# Patient Record
Sex: MALE | Race: BLACK OR AFRICAN AMERICAN | NOT HISPANIC OR LATINO | Employment: FULL TIME | URBAN - METROPOLITAN AREA
[De-identification: names, ages, dates, MRNs, and addresses within clinical notes are randomized per-mention and may not be internally consistent; named-entity substitution may affect disease eponyms.]

---

## 2020-09-29 ENCOUNTER — CLINICAL SUPPORT (OUTPATIENT)
Dept: INTERNAL MEDICINE | Facility: CLINIC | Age: 50
End: 2020-09-29
Payer: COMMERCIAL

## 2020-09-29 ENCOUNTER — OFFICE VISIT (OUTPATIENT)
Dept: INTERNAL MEDICINE | Facility: CLINIC | Age: 50
End: 2020-09-29
Payer: COMMERCIAL

## 2020-09-29 ENCOUNTER — IMMUNIZATION (OUTPATIENT)
Dept: INTERNAL MEDICINE | Facility: CLINIC | Age: 50
End: 2020-09-29
Payer: COMMERCIAL

## 2020-09-29 VITALS
OXYGEN SATURATION: 97 % | HEIGHT: 66 IN | SYSTOLIC BLOOD PRESSURE: 130 MMHG | BODY MASS INDEX: 40.98 KG/M2 | DIASTOLIC BLOOD PRESSURE: 88 MMHG | WEIGHT: 255 LBS

## 2020-09-29 DIAGNOSIS — G89.29 CHRONIC LOW BACK PAIN WITH SCIATICA, SCIATICA LATERALITY UNSPECIFIED, UNSPECIFIED BACK PAIN LATERALITY: ICD-10-CM

## 2020-09-29 DIAGNOSIS — M54.40 CHRONIC LOW BACK PAIN WITH SCIATICA, SCIATICA LATERALITY UNSPECIFIED, UNSPECIFIED BACK PAIN LATERALITY: ICD-10-CM

## 2020-09-29 DIAGNOSIS — I10 HYPERTENSION, UNSPECIFIED TYPE: Primary | ICD-10-CM

## 2020-09-29 DIAGNOSIS — Z00.00 LABORATORY EXAMINATION ORDERED AS PART OF A ROUTINE GENERAL MEDICAL EXAMINATION: ICD-10-CM

## 2020-09-29 DIAGNOSIS — Z11.59 ENCOUNTER FOR HEPATITIS C SCREENING TEST FOR LOW RISK PATIENT: ICD-10-CM

## 2020-09-29 DIAGNOSIS — G47.33 OSA (OBSTRUCTIVE SLEEP APNEA): ICD-10-CM

## 2020-09-29 PROCEDURE — 99999 PR PBB SHADOW E&M-EST. PATIENT-LVL I: CPT | Mod: PBBFAC,,,

## 2020-09-29 PROCEDURE — 90715 TDAP VACCINE GREATER THAN OR EQUAL TO 7YO IM: ICD-10-PCS | Mod: S$GLB,,, | Performed by: INTERNAL MEDICINE

## 2020-09-29 PROCEDURE — 90472 TDAP VACCINE GREATER THAN OR EQUAL TO 7YO IM: ICD-10-PCS | Mod: S$GLB,,, | Performed by: INTERNAL MEDICINE

## 2020-09-29 PROCEDURE — 99204 OFFICE O/P NEW MOD 45 MIN: CPT | Mod: 25,S$GLB,, | Performed by: INTERNAL MEDICINE

## 2020-09-29 PROCEDURE — 99204 PR OFFICE/OUTPT VISIT, NEW, LEVL IV, 45-59 MIN: ICD-10-PCS | Mod: 25,S$GLB,, | Performed by: INTERNAL MEDICINE

## 2020-09-29 PROCEDURE — 90471 FLU VACCINE (QUAD) GREATER THAN OR EQUAL TO 3YO PRESERVATIVE FREE IM: ICD-10-PCS | Mod: S$GLB,,, | Performed by: INTERNAL MEDICINE

## 2020-09-29 PROCEDURE — 99999 PR PBB SHADOW E&M-EST. PATIENT-LVL IV: ICD-10-PCS | Mod: PBBFAC,,, | Performed by: INTERNAL MEDICINE

## 2020-09-29 PROCEDURE — 99999 PR PBB SHADOW E&M-EST. PATIENT-LVL I: ICD-10-PCS | Mod: PBBFAC,,,

## 2020-09-29 PROCEDURE — 90686 IIV4 VACC NO PRSV 0.5 ML IM: CPT | Mod: S$GLB,,, | Performed by: INTERNAL MEDICINE

## 2020-09-29 PROCEDURE — 90471 IMMUNIZATION ADMIN: CPT | Mod: S$GLB,,, | Performed by: INTERNAL MEDICINE

## 2020-09-29 PROCEDURE — 90715 TDAP VACCINE 7 YRS/> IM: CPT | Mod: S$GLB,,, | Performed by: INTERNAL MEDICINE

## 2020-09-29 PROCEDURE — 90686 FLU VACCINE (QUAD) GREATER THAN OR EQUAL TO 3YO PRESERVATIVE FREE IM: ICD-10-PCS | Mod: S$GLB,,, | Performed by: INTERNAL MEDICINE

## 2020-09-29 PROCEDURE — 99999 PR PBB SHADOW E&M-EST. PATIENT-LVL IV: CPT | Mod: PBBFAC,,, | Performed by: INTERNAL MEDICINE

## 2020-09-29 PROCEDURE — 90472 IMMUNIZATION ADMIN EACH ADD: CPT | Mod: S$GLB,,, | Performed by: INTERNAL MEDICINE

## 2020-09-29 RX ORDER — CELECOXIB 100 MG/1
100 CAPSULE ORAL 2 TIMES DAILY PRN
Qty: 60 CAPSULE | Refills: 1 | Status: SHIPPED | OUTPATIENT
Start: 2020-09-29 | End: 2021-05-27

## 2020-09-29 RX ORDER — LOSARTAN POTASSIUM 50 MG/1
TABLET ORAL
COMMUNITY
Start: 2020-08-22 | End: 2021-03-01 | Stop reason: SDUPTHER

## 2020-09-29 RX ORDER — HYDROCHLOROTHIAZIDE 12.5 MG/1
12.5 CAPSULE ORAL DAILY
COMMUNITY
End: 2021-03-01

## 2020-09-29 NOTE — PROGRESS NOTES
"    CHIEF COMPLAINT     Chief Complaint   Patient presents with    [facility]     initial visit with Dr Weiss. previous provider was outside of [facility].     Hypertension     is currently taking Losartan and HCTZ (has been off for about 4-4 1/2 weeks) to help lower BP. patient is requesting Rx refills on HCTZ.    Sciatica     reports of R sided sciatica (R upper thigh radiates down R leg). anti inflammatories are realy helpful, has not tried nerve medication such as Gabapentin.       HPI     Coy Rosado Jr. is a 49 y.o. male here today for     HTN  Prescribed losartan 50mg and hctzd 12.5mg daily.  He has been out of the hctzd 4.5 weeks. Doesn't check BP at home. No symptoms of HTN.  RF: BMI, CHERRY    Lower Back pain  Reports remote injury feel stripping and waxing floors. Reports symptoms are off and on. Reports improve with stretching. Takes PRN celebrex. Reports requiring medications approximately 1-2x month.  Patient also reports sometimes get a little bit radiculopathy with symptoms.  Patient had imaging at 1 point.  Never had injections never been in physical therapy never had surgery.    Sleep apnea  Patient was diagnosed with CHERRY in 2017.  Reports he uses machine nightly.        Personally Reviewed Patient's Medical, surgical, family and social hx. Changes updated in [facility].  Care Team updated in Epic    Review of Systems:  Review of Systems   Constitutional: Negative for fever and unexpected weight change.   Respiratory: Negative for cough and choking.    Gastrointestinal: Negative for blood in stool and constipation.   Musculoskeletal: Positive for back pain.       Health Maintenance:   Reviewed with patient  Due for the following:      PHYSICAL EXAM     /88 (BP Location: Left arm, Patient Position: Sitting, BP Method: Large (Manual))   Ht 5' 6" (1.676 m)   Wt 115.7 kg (255 lb)   SpO2 97%   BMI 41.16 kg/m²     Gen: Well Appearing, NAD  HEENT: PERR, EOMI  Neck: FROM, no thyromegaly, no cervical " adenopathy  CVD: RRR, no M/R/G  Pulm: Normal work of breathing, CTAB, no wheezing  Abd:  Soft, NT, ND non TTP, no mass  MSK: no LE edema  Neuro: A&Ox3, gait normal, speech normal  Mood; Mood normal, behavior normal, thought process linear       LABS     Labs reviewed; Notable for    ASSESSMENT     1. Hypertension, unspecified type     2. Chronic low back pain with sciatica, sciatica laterality unspecified, unspecified back pain laterality  celecoxib (CELEBREX) 100 MG capsule   3. CHERRY (obstructive sleep apnea)  Ambulatory referral/consult to Sleep Disorders   4. Encounter for hepatitis C screening test for low risk patient  Hepatitis C Antibody   5. BMI 40.0-44.9, adult     6. Laboratory examination ordered as part of a routine general medical examination  Lipid Panel    Hemoglobin A1C    Comprehensive metabolic panel    CBC auto differential           Plan     Coy Rosado Jr. is a 49 y.o. male with hypertension, CHERRY chronic low back pain and BMI 41 here today for below issues    1. Hypertension, unspecified type  Blood pressure appears to be fairly well controlled on losartan 50 monotherapy.  Discussed increasing to 100 mg verses maintaining 50 mg dose and making lifestyle optimization.  Shared decision was made to try lifestyle optimization.  Patient will check blood pressure twice some week and then will follow-up 1 month with me.    2. Chronic low back pain with sciatica, sciatica laterality unspecified,   unspecified back pain laterality  Will refill Celebrex for patient to take 1 to 2 times a month.  Will have him reach out to me if he needs more frequent that.  Would be a good candidate for PT.  Think core strengthening weight loss will improve some of his symptoms well.  - celecoxib (CELEBREX) 100 MG capsule; Take 1 capsule (100 mg total) by mouth 2 (two) times daily as needed for Pain.  Dispense: 60 capsule; Refill: 1    3. CHERRY (obstructive sleep apnea)  Patient has not had titration study in over 3 years  with some weight change.  Think he would benefit from reestablishing with sleep.  - Ambulatory referral/consult to Sleep Disorders; Future    4. Encounter for hepatitis C screening test for low risk patient  - Hepatitis C Antibody; Future    5. BMI 40.0-44.9, adult  Discussed lifestyle interventions for weight loss  Diet: harm reduction strategy: avoid liquid calories, add fruits and veggies to crowd out refined carbs, watch portions, eat at home more often  Activity: activity levelgoal of 150min/week  Sleep: optimize for 8 hours of sleep nightly  Hedonistic eating: avoid eating out of boredom, stress or in front of TV      6. Laboratory examination ordered as part of a routine general medical   examination  Labs for next time  - Lipid Panel; Future  - Hemoglobin A1C; Future  - Comprehensive metabolic panel; Future  - CBC auto differential; Future      Shorty Weiss MD

## 2020-09-29 NOTE — PATIENT INSTRUCTIONS
Eating Heart-Healthy Food: Using the DASH Plan    Eating for your heart doesnt have to be hard or boring. You just need to know how to make healthier choices. The DASH eating plan has been developed to help you do just that. DASH stands for Dietary Approaches to Stop Hypertension. It is a plan that has been proven to be healthier for your heart and to lower your risk for high blood pressure. It can also help lower your risk for cancer, heart disease, osteoporosis, and diabetes.  Choosing from each food group  Choose foods from each of the food groups below each day. Try to get the recommended number of servings for each food group. The serving numbers are based on a diet of 2,000 calories a day. Talk to your doctor if youre unsure about your calorie needs. Along with getting the correct servings, the DASH plan also recommends a sodium intake less than 2,300 mg per day.        Grains  Servings: 6 to 8 a day  A serving is:  · 1 slice bread  · 1 ounce dry cereal  · Half a cup cooked rice, pasta or cereal  Best choices: Whole grains and any grains high in fiber. Vegetables  Servings: 4 to 5 a day  A serving is:  · 1 cup raw leafy vegetable  · Half a cup cut-up raw or cooked vegetable  · Half a cup vegetable juice  Best choices: Fresh or frozen vegetables prepared without added salt or fat.   Fruits  Servings: 4 to 5 a day  A serving is:  · 1 medium fruit  · One-quarter cup dried fruit  · Half a cup fresh, frozen, or canned fruit  · Half a cup of 100% fruit juices  Best choices: A variety of fresh fruits of different colors. Whole fruits are a better choice than fruit juices. Low-fat or fat-free dairy  Servings: 2 to 3 a day  A serving is:  · 1 cup milk  · 1 cup yogurt  · One and a half ounces cheese  Best choices: Skim or 1% milk, low-fat or fat-free yogurt or buttermilk, and low-fat cheeses.         Lean meats, poultry, fish  Servings: 6 or fewer a day  A serving is:  · 1 ounce cooked meats, poultry, or fish  · 1  egg  Best choices: Lean poultry and fish. Trim away visible fat. Broil, grill, roast, or boil instead of frying. Remove skin from poultry before eating. Limit how much red meat you eat.  Nuts, seeds, beans  Servings: 4 to 5 a week  A serving is:  · One-third cup nuts (one and a half ounces)  · 2 tablespoons nut butter or seeds  · Half a cup cooked dry beans or legumes  Best choices: Dry roasted nuts with no salt added, lentils, kidney beans, garbanzo beans, and whole davila beans.   Fats and oils  Servings: 2 to 3 a day  A serving is:  · 1 teaspoon vegetable oil  · 1 teaspoon soft margarine  · 1 tablespoon mayonnaise  · 2 tablespoons salad dressing  Best choices: Nut and vegetable oils (nontropical vegetable oils), such as olive and canola oil. Sweets  Servings: 5 a week or fewer  A serving is:  · 1 tablespoon sugar, maple syrup, or honey  · 1 tablespoon jam or jelly  · 1 half-ounce jelly beans (about 15)  · 1 cup lemonade  Best choices: Dried fruit can be a satisfying sweet. Choose low-fat sweets. And watch your serving sizes!      For more on the DASH eating plan, visit:  www.nhlbi.nih.gov/health/health-topics/topics/dash   Date Last Reviewed: 6/1/2016  © 3003-2416 [employer]. [address] [place] [address], [place] [address]. All rights reserved. This information is not intended as a substitute for professional medical care. Always follow your healthcare professional's instructions.

## 2020-09-30 ENCOUNTER — OFFICE VISIT (OUTPATIENT)
Dept: SLEEP MEDICINE | Facility: CLINIC | Age: 50
End: 2020-09-30
Payer: COMMERCIAL

## 2020-09-30 VITALS
WEIGHT: 248.56 LBS | SYSTOLIC BLOOD PRESSURE: 125 MMHG | DIASTOLIC BLOOD PRESSURE: 84 MMHG | HEIGHT: 66 IN | HEART RATE: 78 BPM | BODY MASS INDEX: 39.94 KG/M2

## 2020-09-30 DIAGNOSIS — G47.33 OSA (OBSTRUCTIVE SLEEP APNEA): ICD-10-CM

## 2020-09-30 PROCEDURE — 99999 PR PBB SHADOW E&M-EST. PATIENT-LVL III: ICD-10-PCS | Mod: PBBFAC,,, | Performed by: INTERNAL MEDICINE

## 2020-09-30 PROCEDURE — 99999 PR PBB SHADOW E&M-EST. PATIENT-LVL III: CPT | Mod: PBBFAC,,, | Performed by: INTERNAL MEDICINE

## 2020-09-30 PROCEDURE — 99204 OFFICE O/P NEW MOD 45 MIN: CPT | Mod: S$GLB,,, | Performed by: INTERNAL MEDICINE

## 2020-09-30 PROCEDURE — 99204 PR OFFICE/OUTPT VISIT, NEW, LEVL IV, 45-59 MIN: ICD-10-PCS | Mod: S$GLB,,, | Performed by: INTERNAL MEDICINE

## 2020-09-30 NOTE — LETTER
September 30, 2020      Shorty Weiss MD  [address]  [facility] [address]           [id number]  [address]  [facility] [address]  Phone: [phone]          Patient: Coy Rosado Jr.   MR Number: [id number]   YOB: 1970   Date of Visit: 9/30/2020       Dear Dr. Shorty Weiss:    Thank you for referring Coy Rosado to me for evaluation. Attached you will find relevant portions of my assessment and plan of care.    If you have questions, please do not hesitate to call me. I look forward to following Coy Rosado along with you.    Sincerely,    Morteza Gabriel MD    Enclosure  CC:  No Recipients    If you would like to receive this communication electronically, please contact [email or web address] or [phone] to request more information on [employer] [facility] [facility] access.    For providers and/or their staff who would like to refer a patient to [facility], please contact us through our one-stop-shop provider referral line, [facility], at [phone].    If you feel you have received this communication in error or would no longer like to receive these types of communications, please e-mail [email or web address]

## 2020-10-01 ENCOUNTER — TELEPHONE (OUTPATIENT)
Dept: SLEEP MEDICINE | Facility: CLINIC | Age: 50
End: 2020-10-01

## 2020-10-01 DIAGNOSIS — G47.33 OSA (OBSTRUCTIVE SLEEP APNEA): Primary | ICD-10-CM

## 2020-10-01 NOTE — TELEPHONE ENCOUNTER
----- Message from Morteza Gabriel MD sent at 9/30/2020  4:35 PM CDT -----  Make sure to order sleep study , machine and supplies (nasal)

## 2020-10-07 ENCOUNTER — TELEPHONE (OUTPATIENT)
Dept: SLEEP MEDICINE | Facility: OTHER | Age: 50
End: 2020-10-07

## 2020-10-14 ENCOUNTER — TELEPHONE (OUTPATIENT)
Dept: SLEEP MEDICINE | Facility: OTHER | Age: 50
End: 2020-10-14

## 2020-11-10 ENCOUNTER — TELEPHONE (OUTPATIENT)
Dept: SLEEP MEDICINE | Facility: CLINIC | Age: 50
End: 2020-11-10

## 2020-11-10 ENCOUNTER — TELEPHONE (OUTPATIENT)
Dept: SLEEP MEDICINE | Facility: OTHER | Age: 50
End: 2020-11-10

## 2020-11-10 NOTE — TELEPHONE ENCOUNTER
Left patient a message letting him know the time his home sleep study appointment is for tomorrow.  I called him back a few times left messages.

## 2020-11-10 NOTE — TELEPHONE ENCOUNTER
----- Message from Mayuri Paredes sent at 11/10/2020  3:03 PM CST -----  Who Called: ROBERT BHAKTA is the reqeust in detail:Patient is calling in reference to verifying info on sleep appt tomorrow. He stated he is not sure on what time to  equipment. Please adviseCan the clinic reply by [facility]?:[facility] Call Back Number: [phone]

## 2020-11-10 NOTE — TELEPHONE ENCOUNTER
----- Message from Sukumar Butler sent at 11/10/2020  2:34 PM CST -----      Name of Who is Calling: ROBERT BHAKTA JR. [[id number]]      What is the request in detail: Pt returned call to the office.Please contact to further discuss and advise.          Can the clinic reply by [facility]: N      What [facility] to [facility]: [phone]

## 2020-11-11 ENCOUNTER — HOSPITAL ENCOUNTER (OUTPATIENT)
Dept: SLEEP MEDICINE | Facility: OTHER | Age: 50
Discharge: HOME | End: 2020-11-11
Attending: INTERNAL MEDICINE
Payer: COMMERCIAL

## 2020-11-11 DIAGNOSIS — G47.33 OSA (OBSTRUCTIVE SLEEP APNEA): ICD-10-CM

## 2020-11-11 PROCEDURE — 95800 SLP STDY UNATTENDED: CPT

## 2020-11-13 ENCOUNTER — TELEPHONE (OUTPATIENT)
Dept: SLEEP MEDICINE | Facility: CLINIC | Age: 50
End: 2020-11-13

## 2020-11-13 DIAGNOSIS — G47.33 OSA (OBSTRUCTIVE SLEEP APNEA): Primary | ICD-10-CM

## 2020-11-13 NOTE — TELEPHONE ENCOUNTER
Left message on the patient's voicemail that his Hst showed CHERRY and I will put an order in for a new machine.  Will also send a message on the patient portal.       Orders:  autoCPAP [id number]  Nasal interface

## 2021-02-01 ENCOUNTER — OCCUPATIONAL HEALTH (OUTPATIENT)
Dept: URGENT CARE | Facility: CLINIC | Age: 51
End: 2021-02-01

## 2021-02-01 DIAGNOSIS — Z11.59 SCREENING FOR VIRAL DISEASE: Primary | ICD-10-CM

## 2021-02-01 LAB
CTP QC/QA: YES
SARS-COV-2 RDRP RESP QL NAA+PROBE: NEGATIVE

## 2021-02-01 PROCEDURE — 99199 CV19 SPECIMEN HANDLING FEE / SWAB: ICD-10-PCS | Mod: S$GLB,,, | Performed by: FAMILY MEDICINE

## 2021-02-01 PROCEDURE — U0002 COVID-19 LAB TEST NON-CDC: HCPCS | Mod: QW,S$GLB,, | Performed by: FAMILY MEDICINE

## 2021-02-01 PROCEDURE — U0002: ICD-10-PCS | Mod: QW,S$GLB,, | Performed by: FAMILY MEDICINE

## 2021-02-01 PROCEDURE — 99199 UNLISTED SPECIAL SVC PX/RPRT: CPT | Mod: S$GLB,,, | Performed by: FAMILY MEDICINE

## 2021-03-01 ENCOUNTER — LAB VISIT (OUTPATIENT)
Dept: INTERNAL MEDICINE | Facility: CLINIC | Age: 51
End: 2021-03-01
Payer: COMMERCIAL

## 2021-03-01 ENCOUNTER — OFFICE VISIT (OUTPATIENT)
Dept: INTERNAL MEDICINE | Facility: CLINIC | Age: 51
End: 2021-03-01
Payer: COMMERCIAL

## 2021-03-01 ENCOUNTER — LAB VISIT (OUTPATIENT)
Dept: LAB | Facility: HOSPITAL | Age: 51
End: 2021-03-01
Payer: COMMERCIAL

## 2021-03-01 VITALS
SYSTOLIC BLOOD PRESSURE: 126 MMHG | DIASTOLIC BLOOD PRESSURE: 86 MMHG | BODY MASS INDEX: 41.27 KG/M2 | HEIGHT: 66 IN | OXYGEN SATURATION: 98 % | HEART RATE: 70 BPM | WEIGHT: 256.81 LBS | TEMPERATURE: 98 F

## 2021-03-01 DIAGNOSIS — M54.2 NECK AND SHOULDER PAIN: ICD-10-CM

## 2021-03-01 DIAGNOSIS — R53.83 FATIGUE, UNSPECIFIED TYPE: ICD-10-CM

## 2021-03-01 DIAGNOSIS — R09.81 SINUS CONGESTION: ICD-10-CM

## 2021-03-01 DIAGNOSIS — I10 ESSENTIAL HYPERTENSION: ICD-10-CM

## 2021-03-01 DIAGNOSIS — R51.9 NONINTRACTABLE EPISODIC HEADACHE, UNSPECIFIED HEADACHE TYPE: ICD-10-CM

## 2021-03-01 DIAGNOSIS — R51.9 NONINTRACTABLE EPISODIC HEADACHE, UNSPECIFIED HEADACHE TYPE: Primary | ICD-10-CM

## 2021-03-01 DIAGNOSIS — M25.519 NECK AND SHOULDER PAIN: ICD-10-CM

## 2021-03-01 LAB
ALBUMIN SERPL BCP-MCNC: 3.6 G/DL (ref 3.5–5.2)
ALP SERPL-CCNC: 98 U/L (ref 55–135)
ALT SERPL W/O P-5'-P-CCNC: 17 U/L (ref 10–44)
ANION GAP SERPL CALC-SCNC: 6 MMOL/L (ref 8–16)
AST SERPL-CCNC: 12 U/L (ref 10–40)
BASOPHILS # BLD AUTO: 0.04 K/UL (ref 0–0.2)
BASOPHILS NFR BLD: 0.7 % (ref 0–1.9)
BILIRUB SERPL-MCNC: 0.6 MG/DL (ref 0.1–1)
BUN SERPL-MCNC: 13 MG/DL (ref 6–20)
CALCIUM SERPL-MCNC: 9.5 MG/DL (ref 8.7–10.5)
CHLORIDE SERPL-SCNC: 105 MMOL/L (ref 95–110)
CO2 SERPL-SCNC: 29 MMOL/L (ref 23–29)
CREAT SERPL-MCNC: 0.9 MG/DL (ref 0.5–1.4)
DIFFERENTIAL METHOD: ABNORMAL
EOSINOPHIL # BLD AUTO: 0.1 K/UL (ref 0–0.5)
EOSINOPHIL NFR BLD: 1.8 % (ref 0–8)
ERYTHROCYTE [DISTWIDTH] IN BLOOD BY AUTOMATED COUNT: 12.5 % (ref 11.5–14.5)
EST. GFR  (AFRICAN AMERICAN): >60 ML/MIN/1.73 M^2
EST. GFR  (NON AFRICAN AMERICAN): >60 ML/MIN/1.73 M^2
GLUCOSE SERPL-MCNC: 91 MG/DL (ref 70–110)
HCT VFR BLD AUTO: 41.3 % (ref 40–54)
HGB BLD-MCNC: 13.6 G/DL (ref 14–18)
IMM GRANULOCYTES # BLD AUTO: 0.03 K/UL (ref 0–0.04)
IMM GRANULOCYTES NFR BLD AUTO: 0.5 % (ref 0–0.5)
LYMPHOCYTES # BLD AUTO: 2.9 K/UL (ref 1–4.8)
LYMPHOCYTES NFR BLD: 47.4 % (ref 18–48)
MCH RBC QN AUTO: 30.7 PG (ref 27–31)
MCHC RBC AUTO-ENTMCNC: 32.9 G/DL (ref 32–36)
MCV RBC AUTO: 93 FL (ref 82–98)
MONOCYTES # BLD AUTO: 0.8 K/UL (ref 0.3–1)
MONOCYTES NFR BLD: 12.9 % (ref 4–15)
NEUTROPHILS # BLD AUTO: 2.2 K/UL (ref 1.8–7.7)
NEUTROPHILS NFR BLD: 36.7 % (ref 38–73)
NRBC BLD-RTO: 0 /100 WBC
PLATELET # BLD AUTO: 271 K/UL (ref 150–350)
PMV BLD AUTO: 9.8 FL (ref 9.2–12.9)
POTASSIUM SERPL-SCNC: 4.1 MMOL/L (ref 3.5–5.1)
PROT SERPL-MCNC: 7.4 G/DL (ref 6–8.4)
RBC # BLD AUTO: 4.43 M/UL (ref 4.6–6.2)
SODIUM SERPL-SCNC: 140 MMOL/L (ref 136–145)
TSH SERPL DL<=0.005 MIU/L-ACNC: 1.02 UIU/ML (ref 0.4–4)
WBC # BLD AUTO: 6.05 K/UL (ref 3.9–12.7)

## 2021-03-01 PROCEDURE — 36415 COLL VENOUS BLD VENIPUNCTURE: CPT

## 2021-03-01 PROCEDURE — U0003 INFECTIOUS AGENT DETECTION BY NUCLEIC ACID (DNA OR RNA); SEVERE ACUTE RESPIRATORY SYNDROME CORONAVIRUS 2 (SARS-COV-2) (CORONAVIRUS DISEASE [COVID-19]), AMPLIFIED PROBE TECHNIQUE, MAKING USE OF HIGH THROUGHPUT TECHNOLOGIES AS DESCRIBED BY CMS-2020-01-R: HCPCS

## 2021-03-01 PROCEDURE — 84443 ASSAY THYROID STIM HORMONE: CPT

## 2021-03-01 PROCEDURE — 99214 OFFICE O/P EST MOD 30 MIN: CPT | Mod: S$GLB,,, | Performed by: PHYSICIAN ASSISTANT

## 2021-03-01 PROCEDURE — 99214 PR OFFICE/OUTPT VISIT, EST, LEVL IV, 30-39 MIN: ICD-10-PCS | Mod: S$GLB,,, | Performed by: PHYSICIAN ASSISTANT

## 2021-03-01 PROCEDURE — 99999 PR PBB SHADOW E&M-EST. PATIENT-LVL III: CPT | Mod: PBBFAC,,, | Performed by: PHYSICIAN ASSISTANT

## 2021-03-01 PROCEDURE — 85025 COMPLETE CBC W/AUTO DIFF WBC: CPT

## 2021-03-01 PROCEDURE — 80053 COMPREHEN METABOLIC PANEL: CPT

## 2021-03-01 PROCEDURE — U0005 INFEC AGEN DETEC AMPLI PROBE: HCPCS

## 2021-03-01 PROCEDURE — 99999 PR PBB SHADOW E&M-EST. PATIENT-LVL III: ICD-10-PCS | Mod: PBBFAC,,, | Performed by: PHYSICIAN ASSISTANT

## 2021-03-01 RX ORDER — LOSARTAN POTASSIUM 50 MG/1
50 TABLET ORAL DAILY
Qty: 90 TABLET | Refills: 2 | Status: SHIPPED | OUTPATIENT
Start: 2021-03-01 | End: 2022-03-22

## 2021-03-01 RX ORDER — TIZANIDINE 4 MG/1
4 TABLET ORAL NIGHTLY PRN
Qty: 10 TABLET | Refills: 0 | Status: SHIPPED | OUTPATIENT
Start: 2021-03-01 | End: 2021-03-11

## 2021-03-02 LAB — SARS-COV-2 RNA RESP QL NAA+PROBE: NOT DETECTED

## 2021-05-03 DIAGNOSIS — M25.569 KNEE PAIN, UNSPECIFIED CHRONICITY, UNSPECIFIED LATERALITY: Primary | ICD-10-CM

## 2021-05-04 ENCOUNTER — OFFICE VISIT (OUTPATIENT)
Dept: ORTHOPEDICS | Facility: CLINIC | Age: 51
End: 2021-05-04
Payer: COMMERCIAL

## 2021-05-04 ENCOUNTER — HOSPITAL ENCOUNTER (OUTPATIENT)
Dept: RADIOLOGY | Facility: HOSPITAL | Age: 51
Discharge: HOME | End: 2021-05-04
Attending: ORTHOPAEDIC SURGERY
Payer: COMMERCIAL

## 2021-05-04 ENCOUNTER — TELEPHONE (OUTPATIENT)
Dept: ORTHOPEDICS | Facility: CLINIC | Age: 51
End: 2021-05-04

## 2021-05-04 VITALS — BODY MASS INDEX: 40.76 KG/M2 | WEIGHT: 253.63 LBS | HEIGHT: 66 IN

## 2021-05-04 DIAGNOSIS — M25.569 KNEE PAIN, UNSPECIFIED CHRONICITY, UNSPECIFIED LATERALITY: ICD-10-CM

## 2021-05-04 DIAGNOSIS — M17.11 PRIMARY OSTEOARTHRITIS OF RIGHT KNEE: Primary | ICD-10-CM

## 2021-05-04 PROCEDURE — 99999 PR PBB SHADOW E&M-EST. PATIENT-LVL II: ICD-10-PCS | Mod: PBBFAC,,, | Performed by: ORTHOPAEDIC SURGERY

## 2021-05-04 PROCEDURE — 73564 XR KNEE ORTHO BILAT WITH FLEXION: ICD-10-PCS | Mod: 26,50,, | Performed by: RADIOLOGY

## 2021-05-04 PROCEDURE — 73564 X-RAY EXAM KNEE 4 OR MORE: CPT | Mod: TC,50

## 2021-05-04 PROCEDURE — 99999 PR PBB SHADOW E&M-EST. PATIENT-LVL II: CPT | Mod: PBBFAC,,, | Performed by: ORTHOPAEDIC SURGERY

## 2021-05-04 PROCEDURE — 73564 X-RAY EXAM KNEE 4 OR MORE: CPT | Mod: 26,50,, | Performed by: RADIOLOGY

## 2021-05-04 PROCEDURE — 99203 OFFICE O/P NEW LOW 30 MIN: CPT | Mod: 25,S$GLB,, | Performed by: ORTHOPAEDIC SURGERY

## 2021-05-04 PROCEDURE — 20610 DRAIN/INJ JOINT/BURSA W/O US: CPT | Mod: RT,S$GLB,, | Performed by: ORTHOPAEDIC SURGERY

## 2021-05-04 PROCEDURE — 99203 PR OFFICE/OUTPT VISIT, NEW, LEVL III, 30-44 MIN: ICD-10-PCS | Mod: 25,S$GLB,, | Performed by: ORTHOPAEDIC SURGERY

## 2021-05-04 PROCEDURE — 20610 LARGE JOINT ASPIRATION/INJECTION: R KNEE: ICD-10-PCS | Mod: RT,S$GLB,, | Performed by: ORTHOPAEDIC SURGERY

## 2021-05-04 RX ADMIN — TRIAMCINOLONE ACETONIDE 40 MG: 40 INJECTION, SUSPENSION INTRA-ARTICULAR; INTRAMUSCULAR at 09:05

## 2021-05-16 RX ORDER — TRIAMCINOLONE ACETONIDE 40 MG/ML
40 INJECTION, SUSPENSION INTRA-ARTICULAR; INTRAMUSCULAR
Status: DISCONTINUED | OUTPATIENT
Start: 2021-05-04 | End: 2021-05-16 | Stop reason: HOSPADM

## 2021-05-26 DIAGNOSIS — M54.40 CHRONIC LOW BACK PAIN WITH SCIATICA, SCIATICA LATERALITY UNSPECIFIED, UNSPECIFIED BACK PAIN LATERALITY: ICD-10-CM

## 2021-05-26 DIAGNOSIS — G89.29 CHRONIC LOW BACK PAIN WITH SCIATICA, SCIATICA LATERALITY UNSPECIFIED, UNSPECIFIED BACK PAIN LATERALITY: ICD-10-CM

## 2021-05-27 RX ORDER — CELECOXIB 100 MG/1
CAPSULE ORAL
Qty: 60 CAPSULE | Refills: 0 | Status: SHIPPED | OUTPATIENT
Start: 2021-05-27 | End: 2021-08-09

## 2021-07-20 ENCOUNTER — OFFICE VISIT (OUTPATIENT)
Dept: ORTHOPEDICS | Facility: CLINIC | Age: 51
End: 2021-07-20
Payer: COMMERCIAL

## 2021-07-20 VITALS — HEIGHT: 66 IN | WEIGHT: 260.38 LBS | BODY MASS INDEX: 41.85 KG/M2

## 2021-07-20 DIAGNOSIS — M17.11 PRIMARY OSTEOARTHRITIS OF RIGHT KNEE: Primary | ICD-10-CM

## 2021-07-20 PROCEDURE — 99213 PR OFFICE/OUTPT VISIT, EST, LEVL III, 20-29 MIN: ICD-10-PCS | Mod: S$GLB,,, | Performed by: ORTHOPAEDIC SURGERY

## 2021-07-20 PROCEDURE — 99999 PR PBB SHADOW E&M-EST. PATIENT-LVL III: ICD-10-PCS | Mod: PBBFAC,,, | Performed by: ORTHOPAEDIC SURGERY

## 2021-07-20 PROCEDURE — 99999 PR PBB SHADOW E&M-EST. PATIENT-LVL III: CPT | Mod: PBBFAC,,, | Performed by: ORTHOPAEDIC SURGERY

## 2021-07-20 PROCEDURE — 99213 OFFICE O/P EST LOW 20 MIN: CPT | Mod: S$GLB,,, | Performed by: ORTHOPAEDIC SURGERY

## 2021-08-03 ENCOUNTER — OFFICE VISIT (OUTPATIENT)
Dept: ORTHOPEDICS | Facility: CLINIC | Age: 51
End: 2021-08-03
Payer: COMMERCIAL

## 2021-08-03 VITALS — WEIGHT: 258.25 LBS | BODY MASS INDEX: 41.5 KG/M2 | HEIGHT: 66 IN

## 2021-08-03 DIAGNOSIS — M17.11 PRIMARY OSTEOARTHRITIS OF RIGHT KNEE: Primary | ICD-10-CM

## 2021-08-03 PROCEDURE — 20610 DRAIN/INJ JOINT/BURSA W/O US: CPT | Mod: RT,S$GLB,, | Performed by: ORTHOPAEDIC SURGERY

## 2021-08-03 PROCEDURE — 99999 PR PBB SHADOW E&M-EST. PATIENT-LVL III: CPT | Mod: PBBFAC,,, | Performed by: ORTHOPAEDIC SURGERY

## 2021-08-03 PROCEDURE — 20610 LARGE JOINT ASPIRATION/INJECTION: R KNEE: ICD-10-PCS | Mod: RT,S$GLB,, | Performed by: ORTHOPAEDIC SURGERY

## 2021-08-03 PROCEDURE — 99499 UNLISTED E&M SERVICE: CPT | Mod: S$GLB,,, | Performed by: ORTHOPAEDIC SURGERY

## 2021-08-03 PROCEDURE — 99999 PR PBB SHADOW E&M-EST. PATIENT-LVL III: ICD-10-PCS | Mod: PBBFAC,,, | Performed by: ORTHOPAEDIC SURGERY

## 2021-08-03 PROCEDURE — 99499 NO LOS: ICD-10-PCS | Mod: S$GLB,,, | Performed by: ORTHOPAEDIC SURGERY

## 2021-08-07 DIAGNOSIS — M54.40 CHRONIC LOW BACK PAIN WITH SCIATICA, SCIATICA LATERALITY UNSPECIFIED, UNSPECIFIED BACK PAIN LATERALITY: ICD-10-CM

## 2021-08-07 DIAGNOSIS — G89.29 CHRONIC LOW BACK PAIN WITH SCIATICA, SCIATICA LATERALITY UNSPECIFIED, UNSPECIFIED BACK PAIN LATERALITY: ICD-10-CM

## 2021-08-09 RX ORDER — CELECOXIB 100 MG/1
CAPSULE ORAL
Qty: 60 CAPSULE | Refills: 0 | Status: SHIPPED | OUTPATIENT
Start: 2021-08-09 | End: 2021-10-25

## 2021-08-12 ENCOUNTER — CLINICAL SUPPORT (OUTPATIENT)
Dept: REHABILITATION | Facility: OTHER | Age: 51
End: 2021-08-12
Payer: COMMERCIAL

## 2021-08-12 DIAGNOSIS — G89.29 CHRONIC PATELLOFEMORAL PAIN OF RIGHT KNEE: ICD-10-CM

## 2021-08-12 DIAGNOSIS — Z74.09 IMPAIRED FUNCTIONAL MOBILITY, BALANCE, GAIT, AND ENDURANCE: ICD-10-CM

## 2021-08-12 DIAGNOSIS — M17.11 PRIMARY OSTEOARTHRITIS OF RIGHT KNEE: ICD-10-CM

## 2021-08-12 DIAGNOSIS — R53.1 DECREASED RANGE OF MOTION WITH DECREASED STRENGTH: ICD-10-CM

## 2021-08-12 DIAGNOSIS — M25.561 CHRONIC PATELLOFEMORAL PAIN OF RIGHT KNEE: ICD-10-CM

## 2021-08-12 DIAGNOSIS — M25.60 DECREASED RANGE OF MOTION WITH DECREASED STRENGTH: ICD-10-CM

## 2021-08-12 PROCEDURE — 97162 PT EVAL MOD COMPLEX 30 MIN: CPT | Mod: PN

## 2021-08-12 PROCEDURE — 97110 THERAPEUTIC EXERCISES: CPT | Mod: PN

## 2021-08-17 ENCOUNTER — PATIENT MESSAGE (OUTPATIENT)
Dept: REHABILITATION | Facility: OTHER | Age: 51
End: 2021-08-17

## 2021-09-14 ENCOUNTER — DOCUMENTATION ONLY (OUTPATIENT)
Dept: REHABILITATION | Facility: OTHER | Age: 51
End: 2021-09-14

## 2021-09-16 ENCOUNTER — DOCUMENTATION ONLY (OUTPATIENT)
Dept: REHABILITATION | Facility: OTHER | Age: 51
End: 2021-09-16

## 2021-09-16 VITALS
RESPIRATION RATE: 20 BRPM | DIASTOLIC BLOOD PRESSURE: 84 MMHG | HEART RATE: 75 BPM | WEIGHT: 260 LBS | HEIGHT: 66 IN | SYSTOLIC BLOOD PRESSURE: 134 MMHG | TEMPERATURE: 98 F | BODY MASS INDEX: 41.78 KG/M2 | OXYGEN SATURATION: 97 %

## 2021-09-16 PROCEDURE — 99284 EMERGENCY DEPT VISIT MOD MDM: CPT

## 2021-09-17 ENCOUNTER — PATIENT MESSAGE (OUTPATIENT)
Dept: REHABILITATION | Facility: OTHER | Age: 51
End: 2021-09-17

## 2021-09-17 ENCOUNTER — HOSPITAL ENCOUNTER (EMERGENCY)
Facility: HOSPITAL | Age: 51
Discharge: HOME | End: 2021-09-17
Attending: EMERGENCY MEDICINE
Payer: COMMERCIAL

## 2021-09-17 DIAGNOSIS — S89.91XA RIGHT KNEE INJURY, INITIAL ENCOUNTER: ICD-10-CM

## 2021-09-17 DIAGNOSIS — S59.902A ELBOW INJURY, LEFT, INITIAL ENCOUNTER: ICD-10-CM
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
## 2021-09-17 PROCEDURE — 25000003 PHARM REV CODE 250: Performed by: EMERGENCY MEDICINE

## 2021-09-17 RX ORDER — IBUPROFEN 400 MG/1
400 TABLET ORAL
Status: COMPLETED | OUTPATIENT
Start: 2021-09-17 | End: 2021-09-17

## 2021-09-17 RX ADMIN — IBUPROFEN 400 MG: 400 TABLET ORAL at 12:09

## 2022-01-09 ENCOUNTER — OFFICE VISIT (OUTPATIENT)
Dept: URGENT CARE | Facility: CLINIC | Age: 52
End: 2022-01-09
Payer: COMMERCIAL

## 2022-01-09 VITALS
TEMPERATURE: 98 F | BODY MASS INDEX: 40.18 KG/M2 | DIASTOLIC BLOOD PRESSURE: 84 MMHG | HEART RATE: 78 BPM | WEIGHT: 250 LBS | HEIGHT: 66 IN | RESPIRATION RATE: 18 BRPM | SYSTOLIC BLOOD PRESSURE: 140 MMHG | OXYGEN SATURATION: 98 %

## 2022-01-09 DIAGNOSIS — U07.1 COVID-19: ICD-10-CM

## 2022-01-09 DIAGNOSIS — U07.1 COVID-19 VIRUS DETECTED: ICD-10-CM

## 2022-01-09 DIAGNOSIS — R50.9 FEVER, UNSPECIFIED FEVER CAUSE: Primary | ICD-10-CM

## 2022-01-09 LAB
CTP QC/QA: YES
SARS-COV-2 RDRP RESP QL NAA+PROBE: POSITIVE

## 2022-01-09 PROCEDURE — U0002 COVID-19 LAB TEST NON-CDC: HCPCS | Mod: QW,S$GLB,,

## 2022-01-09 PROCEDURE — 99203 OFFICE O/P NEW LOW 30 MIN: CPT | Mod: S$GLB,,,

## 2022-01-09 PROCEDURE — U0002: ICD-10-PCS | Mod: QW,S$GLB,,

## 2022-01-09 PROCEDURE — 99203 PR OFFICE/OUTPT VISIT, NEW, LEVL III, 30-44 MIN: ICD-10-PCS | Mod: S$GLB,,,

## 2022-01-09 NOTE — PATIENT INSTRUCTIONS
You have tested positive for COVID-19 today.      ISOLATION  If you tested positive and do not have symptoms, you must isolate for 5 days starting on the day of the positive test. I    If you tested positive and have symptoms, you must isolate for 5 days starting on the day of the first symptoms,  not the day of the positive test.     This is the most important part, both the CDC and the [facility] emphasize that you do not test out of isolation.     After 5 days, if your symptoms have improved and you have not had fever on day 5, you can return to the community on day 6- NO TESTING REQUIRED!      In fact, we do not retest if you were positive in the last 90 days.    After your 5 days of isolation are completed, the CDC recommends strict mask use for the first 5 days that you come out of isolation.       PLEASE READ YOUR DISCHARGE INSTRUCTIONS ENTIRELY AS IT CONTAINS IMPORTANT INFORMATION.      Please drink plenty of fluids.    Please get plenty of rest.    Please return here or go to the Emergency Department for any concerns or worsening of condition.    Please take an over the counter antihistamine medication (allegra/Claritin/Zyrtec) of your choice as directed for allergy symptoms and/or runny nose and postnasal drip.    Try an over the counter decongestant for sinus pressure/ear pressure, congestion symptoms like Mucinex D or Sudafed or Phenylephrine. You buy this behind the pharmacy counter    If you do have Hypertension or palpitations, it is safe to take Coricidin HBP for relief of sinus symptoms.    Tylenol or ibuprofen can also be used as directed for pain and fever unless you have an allergy to them or medical condition such as stomach ulcers, kidney or liver disease or blood thinners etc for which you should not be taking these type of medications.     Sore throat recommendations: Warm fluids, warm salt water gargles, throat lozenges, tea, honey, soup, rest, hydration.    Use over the counter flonase or  nasocort: one spray each nostril twice daily OR two sprays each nostril once daily until nares dry out, unless you have Glaucoma.   Can also supplement with nasal saline rinse.    Sinus rinses DO NOT USE TAP WATER, if you must, water must be a rolling boil for 1 minute, let it cool, then use.  May use distilled water, or over the counter nasal saline rinses.  Vics vapor rub in shower to help open nasal passages.  May use nasal gel to keep passages moisturized.  May use Nasal saline sprays during the day for added relief of congestion.   For those who go to the gym, please do not use the sauna or steam room now to clear sinuses.      Cough     Rest and fluids are important  Can use honey with ollie to soothe your throat    Robitussin or Delsyum for cough suppressant for dry cough.    Mucinex DM or products containing Guaifenesin or Dextromethorphan for expectorant (wet cough).    Take prescription cough meds (pills) as prescribed; take prescription cough syrup at night as needed for cough.  Do not take both the prescribed cough pills and syrup at the same time or within 6 hours of each other.  Do not take the cough syrup with any other sedative medication as it can can cause drowsiness. Do not operate any heavy machinery, drink or drive while taking the cough syrup.     Please follow up with your primary care doctor or specialist in the next 48-72hrs as needed and if no improvement    If you  smoke, please stop smoking.      Please return or see your primary care doctor if you develop new or worsening symptoms.     Please arrange follow up with your primary medical clinic as soon as possible. You must understand that you've received an Urgent Care treatment only and that you may be released before all of your medical problems are known or treated. You, the patient, will arrange for follow up as instructed. If your symptoms worsen or fail to improve you should go to the Emergency Room.

## 2022-01-09 NOTE — PROGRESS NOTES
"Subjective:       Patient ID: Coy Rosado Jr. is a 51 y.o. male.    Vitals:  height is 5' 6" (1.676 m) and weight is 113.4 kg (250 lb). His temperature is 98 °F (36.7 °C). His blood pressure is 140/84 (abnormal) and his pulse is 78. His respiration is 18 and oxygen saturation is 98%.     Chief Complaint: Fever  52yo male complains of fever of 101 yesterday, headaches, body aches and fatigue.  Patient has no known COVID exposure.  He is vaccinated for COVID with a booster.  Patient is taking Tylenol for his fever.  He states that he has associated chest congestion with chest tightness has improved.  He is not currently having any chest pain or shortness of breath.  Patient's vitals are stable.  Patient in no acute respiratory distress.     Constitution: Positive for fever. Negative for chills, sweating and fatigue.   HENT: Positive for congestion. Negative for sore throat.    Cardiovascular: Negative for chest pain and sob on exertion.   Respiratory: Positive for chest tightness (chest congestion) and cough. Negative for wheezing.    Gastrointestinal: Negative for vomiting.   Genitourinary: Negative for dysuria.   Musculoskeletal: Positive for muscle ache.   Neurological: Positive for headaches.       Objective:      Physical Exam   Constitutional: He is oriented to person, place, and time. He appears well-developed and well-nourished. He is cooperative.  Non-toxic appearance. He does not appear ill. No distress.   HENT:   Head: Normocephalic and atraumatic.   Ears:   Right Ear: Hearing, tympanic membrane, external ear and ear canal normal.   Left Ear: Hearing, tympanic membrane, external ear and ear canal normal.   Nose: Nose normal. No mucosal edema, rhinorrhea, nasal deformity or congestion. No epistaxis. Right sinus exhibits no maxillary sinus tenderness and no frontal sinus tenderness. Left sinus exhibits no maxillary sinus tenderness and no frontal sinus tenderness.   Mouth/Throat: Uvula is midline, oropharynx " is clear and moist and mucous membranes are normal. Mucous membranes are moist. No trismus in the jaw. Normal dentition. No uvula swelling. No oropharyngeal exudate or posterior oropharyngeal erythema.   Eyes: Conjunctivae and lids are normal. Right eye exhibits no discharge. Left eye exhibits no discharge. No scleral icterus.   Neck: Trachea normal and phonation normal. Neck supple.   Cardiovascular: Normal rate, regular rhythm, normal heart sounds, intact distal pulses and normal pulses.   Pulmonary/Chest: Effort normal and breath sounds normal. No respiratory distress. He has no wheezes. He has no rhonchi.   Abdominal: Normal appearance and bowel sounds are normal. He exhibits no distension and no mass. Soft. There is no abdominal tenderness.   Musculoskeletal: Normal range of motion.         General: No deformity or edema. Normal range of motion.   Neurological: He is alert and oriented to person, place, and time. He exhibits normal muscle tone. Coordination normal.   Skin: Skin is warm, dry, intact, not diaphoretic and not pale.   Psychiatric: He has a normal mood and affect. His speech is normal and behavior is normal. Judgment and thought content normal.   Nursing note and vitals reviewed.        Results for orders placed or performed in visit on 01/09/22   POCT COVID-19 Rapid Screening   Result Value Ref Range    POC Rapid COVID Positive (A) Negative     Acceptable Yes        Assessment:       1. Fever, unspecified fever cause    2. COVID-19          Plan:       Covid risk of complication score:3. Antibody infusion referral placed. Discussed antibody infusino with patient who agreed with plan for referral.     Discussed results with patient and proper quarantine based on CDC guidelines.   Discussed use of OTC medications for symptom control as this is a viral disease.   All ER precautions covered including but not limited to shortness of breath, intractable fever, or chest pain.  Discussed RTC  if symptoms worsen, change, or persist.   Patient verbalized understanding and agreed with the plan.           Fever, unspecified fever cause  -     POCT COVID-19 Rapid Screening    COVID-19  -     Ambulatory referral/consult to [facility]          Patient Instructions   You have tested positive for COVID-19 today.      ISOLATION  If you tested positive and do not have symptoms, you must isolate for 5 days starting on the day of the positive test. I    If you tested positive and have symptoms, you must isolate for 5 days starting on the day of the first symptoms,  not the day of the positive test.     This is the most important part, both the CDC and the [facility] emphasize that you do not test out of isolation.     After 5 days, if your symptoms have improved and you have not had fever on day 5, you can return to the community on day 6- NO TESTING REQUIRED!      In fact, we do not retest if you were positive in the last 90 days.    After your 5 days of isolation are completed, the CDC recommends strict mask use for the first 5 days that you come out of isolation.       PLEASE READ YOUR DISCHARGE INSTRUCTIONS ENTIRELY AS IT CONTAINS IMPORTANT INFORMATION.      Please drink plenty of fluids.    Please get plenty of rest.    Please return here or go to the Emergency Department for any concerns or worsening of condition.    Please take an over the counter antihistamine medication (allegra/Claritin/Zyrtec) of your choice as directed for allergy symptoms and/or runny nose and postnasal drip.    Try an over the counter decongestant for sinus pressure/ear pressure, congestion symptoms like Mucinex D or Sudafed or Phenylephrine. You buy this behind the pharmacy counter    If you do have Hypertension or palpitations, it is safe to take Coricidin HBP for relief of sinus symptoms.    Tylenol or ibuprofen can also be used as directed for pain and fever unless you have an allergy to them or medical condition such as stomach ulcers,  kidney or liver disease or blood thinners etc for which you should not be taking these type of medications.     Sore throat recommendations: Warm fluids, warm salt water gargles, throat lozenges, tea, honey, soup, rest, hydration.    Use over the counter flonase or nasocort: one spray each nostril twice daily OR two sprays each nostril once daily until nares dry out, unless you have Glaucoma.   Can also supplement with nasal saline rinse.    Sinus rinses DO NOT USE TAP WATER, if you must, water must be a rolling boil for 1 minute, let it cool, then use.  May use distilled water, or over the counter nasal saline rinses.  Vics vapor rub in shower to help open nasal passages.  May use nasal gel to keep passages moisturized.  May use Nasal saline sprays during the day for added relief of congestion.   For those who go to the gym, please do not use the sauna or steam room now to clear sinuses.      Cough     Rest and fluids are important  Can use honey with ollie to soothe your throat    Robitussin or Delsyum for cough suppressant for dry cough.    Mucinex DM or products containing Guaifenesin or Dextromethorphan for expectorant (wet cough).    Take prescription cough meds (pills) as prescribed; take prescription cough syrup at night as needed for cough.  Do not take both the prescribed cough pills and syrup at the same time or within 6 hours of each other.  Do not take the cough syrup with any other sedative medication as it can can cause drowsiness. Do not operate any heavy machinery, drink or drive while taking the cough syrup.     Please follow up with your primary care doctor or specialist in the next 48-72hrs as needed and if no improvement    If you  smoke, please stop smoking.      Please return or see your primary care doctor if you develop new or worsening symptoms.     Please arrange follow up with your primary medical clinic as soon as possible. You must understand that you've received an Urgent Care  treatment only and that you may be released before all of your medical problems are known or treated. You, the patient, will arrange for follow up as instructed. If your symptoms worsen or fail to improve you should go to the Emergency Room.

## 2022-01-09 NOTE — LETTER
[address] [place] [id number]  Phone: [phone]  Fax: [phone]          Return to Work/School    Patient: Coy Rosado Jr.  YOB: 1970   Date: 01/09/2022     To Whom It May Concern:     Coy Rosado Jr. was in contact with/seen in my office on 01/09/2022. COVID-19 is present in our communities across the state. There is limited testing for COVID at this time, so not all patients can be tested. In this situation, your employee meets the following criteria:     Coy Rosado Jr. has met the criteria for COVID-19 testing and has a POSITIVE result. He can return to work once they are asymptomatic for 24 hours without the use of fever reducing medications AND at least five days from the start of symptoms (or from the first positive result if they have no symptoms).      If you have any questions or concerns, or if I can be of further assistance, please do not hesitate to contact me.     Sincerely,    Kimberly Oro PA-C

## 2022-01-10 ENCOUNTER — INFUSION (OUTPATIENT)
Dept: INFECTIOUS DISEASES | Facility: HOSPITAL | Age: 52
End: 2022-01-10
Attending: INTERNAL MEDICINE
Payer: COMMERCIAL

## 2022-01-10 VITALS
WEIGHT: 250 LBS | BODY MASS INDEX: 40.18 KG/M2 | TEMPERATURE: 99 F | HEART RATE: 79 BPM | OXYGEN SATURATION: 97 % | DIASTOLIC BLOOD PRESSURE: 94 MMHG | SYSTOLIC BLOOD PRESSURE: 131 MMHG | RESPIRATION RATE: 16 BRPM | HEIGHT: 66 IN

## 2022-01-10 DIAGNOSIS — U07.1 COVID-19: Primary | ICD-10-CM

## 2022-01-10 PROCEDURE — 25000003 PHARM REV CODE 250: Performed by: INTERNAL MEDICINE

## 2022-01-10 PROCEDURE — 63600175 PHARM REV CODE 636 W HCPCS: Performed by: INTERNAL MEDICINE

## 2022-01-10 PROCEDURE — M0243 CASIRIVI AND IMDEVI INFUSION: HCPCS | Performed by: INTERNAL MEDICINE

## 2022-01-10 RX ORDER — ONDANSETRON 4 MG/1
4 TABLET, ORALLY DISINTEGRATING ORAL ONCE AS NEEDED
Status: ACTIVE | OUTPATIENT
Start: 2022-01-10 | End: 2033-06-08

## 2022-01-10 RX ORDER — EPINEPHRINE 0.3 MG/.3ML
0.3 INJECTION SUBCUTANEOUS
Status: ACTIVE | OUTPATIENT
Start: 2022-01-10

## 2022-01-10 RX ORDER — SODIUM CHLORIDE 0.9 % (FLUSH) 0.9 %
10 SYRINGE (ML) INJECTION
Status: ACTIVE | OUTPATIENT
Start: 2022-01-10

## 2022-01-10 RX ORDER — DIPHENHYDRAMINE HYDROCHLORIDE 50 MG/ML
25 INJECTION INTRAMUSCULAR; INTRAVENOUS ONCE AS NEEDED
Status: ACTIVE | OUTPATIENT
Start: 2022-01-10 | End: 2033-06-08

## 2022-01-10 RX ORDER — ACETAMINOPHEN 325 MG/1
650 TABLET ORAL ONCE AS NEEDED
Status: DISCONTINUED | OUTPATIENT
Start: 2022-01-10 | End: 2022-12-01 | Stop reason: HOSPADM

## 2022-01-10 RX ORDER — ALBUTEROL SULFATE 90 UG/1
2 AEROSOL, METERED RESPIRATORY (INHALATION)
Status: ACTIVE | OUTPATIENT
Start: 2022-01-10

## 2022-01-10 RX ADMIN — CASIRIVIMAB AND IMDEVIMAB 600 MG: 600; 600 INJECTION, SOLUTION, CONCENTRATE INTRAVENOUS at 11:01

## 2022-01-10 NOTE — PROGRESS NOTES
Patient arrives for casirivimab/ imdevimab infusion. Ambulatory. Pt AAox3. No distress noted. RR even and unlabored.     Symptoms and onset date:  1/7/22  Body aches, headaches, congestion in chest,     Tested COVID + on 1/9/22

## 2022-01-12 ENCOUNTER — PATIENT MESSAGE (OUTPATIENT)
Dept: ADMINISTRATIVE | Facility: OTHER | Age: 52
End: 2022-01-12
Payer: COMMERCIAL

## 2022-01-13 DIAGNOSIS — M17.11 PRIMARY OSTEOARTHRITIS OF RIGHT KNEE: Primary | ICD-10-CM

## 2022-01-18 ENCOUNTER — HOSPITAL ENCOUNTER (OUTPATIENT)
Dept: RADIOLOGY | Facility: HOSPITAL | Age: 52
Discharge: HOME | End: 2022-01-18
Attending: ORTHOPAEDIC SURGERY
Payer: COMMERCIAL

## 2022-01-18 ENCOUNTER — OFFICE VISIT (OUTPATIENT)
Dept: ORTHOPEDICS | Facility: CLINIC | Age: 52
End: 2022-01-18
Payer: COMMERCIAL

## 2022-01-18 VITALS — HEIGHT: 68 IN | WEIGHT: 251.13 LBS | BODY MASS INDEX: 38.06 KG/M2

## 2022-01-18 DIAGNOSIS — M17.11 PRIMARY OSTEOARTHRITIS OF RIGHT KNEE: ICD-10-CM

## 2022-01-18 DIAGNOSIS — M17.11 PRIMARY OSTEOARTHRITIS OF RIGHT KNEE: Primary | ICD-10-CM

## 2022-01-18 PROCEDURE — 99499 UNLISTED E&M SERVICE: CPT | Mod: S$GLB,,, | Performed by: ORTHOPAEDIC SURGERY

## 2022-01-18 PROCEDURE — 73560 X-RAY EXAM OF KNEE 1 OR 2: CPT | Mod: 26,50,, | Performed by: RADIOLOGY

## 2022-01-18 PROCEDURE — 20610 DRAIN/INJ JOINT/BURSA W/O US: CPT | Mod: RT,S$GLB,, | Performed by: ORTHOPAEDIC SURGERY

## 2022-01-18 PROCEDURE — 73560 X-RAY EXAM OF KNEE 1 OR 2: CPT | Mod: TC,50

## 2022-01-18 PROCEDURE — 99999 PR PBB SHADOW E&M-EST. PATIENT-LVL III: CPT | Mod: PBBFAC,,, | Performed by: ORTHOPAEDIC SURGERY

## 2022-01-18 PROCEDURE — 99999 PR PBB SHADOW E&M-EST. PATIENT-LVL III: ICD-10-PCS | Mod: PBBFAC,,, | Performed by: ORTHOPAEDIC SURGERY

## 2022-01-18 PROCEDURE — 20610 LARGE JOINT ASPIRATION/INJECTION: R KNEE: ICD-10-PCS | Mod: RT,S$GLB,, | Performed by: ORTHOPAEDIC SURGERY

## 2022-01-18 PROCEDURE — 73560 XR KNEE 1 OR 2 VIEW BILATERAL: ICD-10-PCS | Mod: 26,50,, | Performed by: RADIOLOGY

## 2022-01-18 PROCEDURE — 99499 NO LOS: ICD-10-PCS | Mod: S$GLB,,, | Performed by: ORTHOPAEDIC SURGERY

## 2022-01-18 RX ADMIN — TRIAMCINOLONE ACETONIDE 40 MG: 40 INJECTION, SUSPENSION INTRA-ARTICULAR; INTRAMUSCULAR at 03:01

## 2022-01-18 NOTE — PROGRESS NOTES
Patient returns for repeat right knee evaluation    He had a Monovisc injection August of 2021 he said helped significantly.    The he tested positive for COVID on January 9th this was 2 days after his booster shot that was 9 days ago.  He had infusion therapy on January 10th.    He says his right knee has gotten worse since he had COVID.    BMI down from 42-38.    Exam unchanged consistent with arthritis no concern for septic joint    He has right knee patellofemoral arthritis.    We will do a right knee steroid injection today.  Three-month follow-up for steroid versus hyaluronic acid (would need to call for prior auth for HA)

## 2022-03-08 DIAGNOSIS — G89.29 CHRONIC LOW BACK PAIN WITH SCIATICA, SCIATICA LATERALITY UNSPECIFIED, UNSPECIFIED BACK PAIN LATERALITY: ICD-10-CM

## 2022-03-08 DIAGNOSIS — M54.40 CHRONIC LOW BACK PAIN WITH SCIATICA, SCIATICA LATERALITY UNSPECIFIED, UNSPECIFIED BACK PAIN LATERALITY: ICD-10-CM

## 2022-03-09 RX ORDER — CELECOXIB 100 MG/1
CAPSULE ORAL
Qty: 60 CAPSULE | Refills: 0 | OUTPATIENT
Start: 2022-03-09

## 2022-03-09 NOTE — TELEPHONE ENCOUNTER
Please review for refill.    ----- Message from Rox Leon sent at 3/8/2022 10:28 AM CST -----  Who Called: Patient    Refill or New Rx.: Refill    celecoxib (CELEBREX) 100 MG capsule 60 capsule 0 10/25/2021  No  Sig: TAKE 1 CAPSULE BY MOUTH TWICE DAILY AS NEEDED FOR PAIN  Sent to pharmacy as: celecoxib (CELEBREX) 100 MG capsule  Class: Normal        Preferred Pharmacy with phone number: [facility] - [address]    Local or Mail Order: Local    Ordering Provider: AKHIL Butler    Best Call Back Number: [phone]    Additional Information:

## 2022-03-10 RX ORDER — CELECOXIB 100 MG/1
100 CAPSULE ORAL 2 TIMES DAILY
Qty: 60 CAPSULE | Refills: 1 | Status: SHIPPED | OUTPATIENT
Start: 2022-03-10 | End: 2022-07-06

## 2022-03-15 ENCOUNTER — TELEPHONE (OUTPATIENT)
Dept: ORTHOPEDICS | Facility: CLINIC | Age: 52
End: 2022-03-15
Payer: COMMERCIAL

## 2022-03-15 NOTE — TELEPHONE ENCOUNTER
I called the patient regarding his appointment on 4/12/2022. The patient did not answer. I left a voicemail with a call back number.

## 2022-03-15 NOTE — TELEPHONE ENCOUNTER
Called for pt today to confirm he rec'd the Celebrex Rx but was unable to reach him. LM asking for a returned call.

## 2022-03-16 ENCOUNTER — PATIENT MESSAGE (OUTPATIENT)
Dept: ADMINISTRATIVE | Facility: HOSPITAL | Age: 52
End: 2022-03-16
Payer: COMMERCIAL

## 2022-04-01 ENCOUNTER — TELEPHONE (OUTPATIENT)
Dept: ORTHOPEDICS | Facility: CLINIC | Age: 52
End: 2022-04-01
Payer: COMMERCIAL

## 2022-04-01 NOTE — TELEPHONE ENCOUNTER
I called and spoke to the patient. I was able to reschedule the patient on 4/7/22 at 11:00 with Dr. Wills. The patient is understanding and has no further questions.     ----- Message from Jasmin Butler sent at 4/1/2022  3:28 PM CDT -----  Regarding: Return call  Contact: pt @ [phone]  Patient missed call from [facility], asking for a call back

## 2022-04-01 NOTE — TELEPHONE ENCOUNTER
I called and spoke to the patient regarding his appointment on 4/12/22. The patient did not answer. I left a voicemail with a call back number.

## 2022-04-04 DIAGNOSIS — M17.11 PRIMARY OSTEOARTHRITIS OF RIGHT KNEE: Primary | ICD-10-CM

## 2022-04-06 ENCOUNTER — PATIENT OUTREACH (OUTPATIENT)
Dept: ADMINISTRATIVE | Facility: OTHER | Age: 52
End: 2022-04-06
Payer: COMMERCIAL

## 2022-04-06 ENCOUNTER — TELEPHONE (OUTPATIENT)
Dept: ORTHOPEDICS | Facility: CLINIC | Age: 52
End: 2022-04-06
Payer: COMMERCIAL

## 2022-04-06 NOTE — TELEPHONE ENCOUNTER
I called and spoke to the patient to confirm his xray appointment at  before seeing Dr. Wills. I also provided the patient with the address. The patient is understanding and has no further questions.

## 2022-04-07 ENCOUNTER — HOSPITAL ENCOUNTER (OUTPATIENT)
Dept: RADIOLOGY | Facility: HOSPITAL | Age: 52
Discharge: HOME | End: 2022-04-07
Attending: ORTHOPAEDIC SURGERY
Payer: COMMERCIAL

## 2022-04-07 ENCOUNTER — OFFICE VISIT (OUTPATIENT)
Dept: ORTHOPEDICS | Facility: CLINIC | Age: 52
End: 2022-04-07
Payer: COMMERCIAL

## 2022-04-07 VITALS — BODY MASS INDEX: 37.34 KG/M2 | WEIGHT: 252.13 LBS | HEIGHT: 69 IN

## 2022-04-07 DIAGNOSIS — M17.11 PRIMARY OSTEOARTHRITIS OF RIGHT KNEE: Primary | ICD-10-CM

## 2022-04-07 DIAGNOSIS — M17.11 PRIMARY OSTEOARTHRITIS OF RIGHT KNEE: ICD-10-CM

## 2022-04-07 PROCEDURE — 99499 UNLISTED E&M SERVICE: CPT | Mod: S$GLB,,, | Performed by: ORTHOPAEDIC SURGERY

## 2022-04-07 PROCEDURE — 73562 XR KNEE ORTHO RIGHT WITH FLEXION: ICD-10-PCS | Mod: 26,LT,, | Performed by: RADIOLOGY

## 2022-04-07 PROCEDURE — 99999 PR PBB SHADOW E&M-EST. PATIENT-LVL III: CPT | Mod: PBBFAC,,, | Performed by: ORTHOPAEDIC SURGERY

## 2022-04-07 PROCEDURE — 99999 PR PBB SHADOW E&M-EST. PATIENT-LVL III: ICD-10-PCS | Mod: PBBFAC,,, | Performed by: ORTHOPAEDIC SURGERY

## 2022-04-07 PROCEDURE — 73562 X-RAY EXAM OF KNEE 3: CPT | Mod: 59,TC,LT

## 2022-04-07 PROCEDURE — 99499 NO LOS: ICD-10-PCS | Mod: S$GLB,,, | Performed by: ORTHOPAEDIC SURGERY

## 2022-04-07 PROCEDURE — 73564 XR KNEE ORTHO RIGHT WITH FLEXION: ICD-10-PCS | Mod: 26,RT,, | Performed by: RADIOLOGY

## 2022-04-07 PROCEDURE — 73562 X-RAY EXAM OF KNEE 3: CPT | Mod: 26,LT,, | Performed by: RADIOLOGY

## 2022-04-07 PROCEDURE — 20610 DRAIN/INJ JOINT/BURSA W/O US: CPT | Mod: RT,S$GLB,, | Performed by: ORTHOPAEDIC SURGERY

## 2022-04-07 PROCEDURE — 73564 X-RAY EXAM KNEE 4 OR MORE: CPT | Mod: 26,RT,, | Performed by: RADIOLOGY

## 2022-04-07 PROCEDURE — 20610 LARGE JOINT ASPIRATION/INJECTION: R KNEE: ICD-10-PCS | Mod: RT,S$GLB,, | Performed by: ORTHOPAEDIC SURGERY

## 2022-04-07 RX ADMIN — TRIAMCINOLONE ACETONIDE 40 MG: 40 INJECTION, SUSPENSION INTRA-ARTICULAR; INTRAMUSCULAR at 11:04

## 2022-04-07 NOTE — PROGRESS NOTES
Requested updates within Care Everywhere.  Patient's chart was reviewed for overdue JOLYNN topics.  Health maintenance:updated  Immunizations:reconciled   Legacy:   Media:  Orders placed:  Tasked appts:  Labs Linked:  Upcoming appt:

## 2022-04-15 RX ORDER — TRIAMCINOLONE ACETONIDE 40 MG/ML
40 INJECTION, SUSPENSION INTRA-ARTICULAR; INTRAMUSCULAR
Status: DISCONTINUED | OUTPATIENT
Start: 2022-01-18 | End: 2022-04-15 | Stop reason: HOSPADM

## 2022-04-15 NOTE — PROCEDURES
Large Joint Aspiration/Injection: R knee    Date/Time: 1/18/2022 3:45 PM  Performed by: Shane Wills III, MD  Authorized by: Shane Wills III, MD     Consent Done?:  Yes (Verbal)  Indications:  Pain  Timeout: prior to procedure the correct patient, procedure, and site was verified    Prep: patient was prepped and draped in usual sterile fashion      Local anesthesia used?: Yes    Local anesthetic:  Lidocaine 1% without epinephrine  Anesthetic total (ml):  5      Details:  Needle Size:  21 G  Location:  Knee  Site:  R knee  Medications:  40 mg triamcinolone acetonide 40 mg/mL  Patient tolerance:  Patient tolerated the procedure well with no immediate complications

## 2022-04-26 NOTE — PROGRESS NOTES
Injection Information    Triamcinolone Acetonide Injectable Suspension (40mg/mL)  Lot Number: [id number]   Expiration Date: 10/31/2023

## 2022-05-21 NOTE — PROGRESS NOTES
Patient presents for repeat right knee injection for his right knee osteoarthritis.    Total knee replacement info given.    Three-month follow-up     Tentative plan for repeat injection in July and then total knee replacement in October

## 2022-05-26 RX ORDER — TRIAMCINOLONE ACETONIDE 40 MG/ML
40 INJECTION, SUSPENSION INTRA-ARTICULAR; INTRAMUSCULAR
Status: SHIPPED | OUTPATIENT
Start: 2022-04-07

## 2022-05-26 NOTE — PROCEDURES
Large Joint Aspiration/Injection: R knee    Date/Time: 4/7/2022 11:00 AM  Performed by: Shane Wills III, MD  Authorized by: Shane Wills III, MD     Consent Done?:  Yes (Verbal)  Indications:  Pain  Timeout: prior to procedure the correct patient, procedure, and site was verified    Prep: patient was prepped and draped in usual sterile fashion      Local anesthesia used?: Yes    Local anesthetic:  Lidocaine 1% without epinephrine  Anesthetic total (ml):  5      Details:  Needle Size:  21 G  Location:  Knee  Site:  R knee  Medications:  40 mg triamcinolone acetonide 40 mg/mL  Patient tolerance:  Patient tolerated the procedure well with no immediate complications

## 2022-07-05 ENCOUNTER — TELEPHONE (OUTPATIENT)
Dept: ORTHOPEDICS | Facility: CLINIC | Age: 52
End: 2022-07-05
Payer: COMMERCIAL

## 2022-07-05 ENCOUNTER — OFFICE VISIT (OUTPATIENT)
Dept: ORTHOPEDICS | Facility: CLINIC | Age: 52
End: 2022-07-05
Payer: COMMERCIAL

## 2022-07-05 VITALS — HEIGHT: 69 IN | WEIGHT: 252 LBS | BODY MASS INDEX: 37.33 KG/M2

## 2022-07-05 DIAGNOSIS — I10 ESSENTIAL HYPERTENSION: ICD-10-CM

## 2022-07-05 DIAGNOSIS — M17.11 PRIMARY OSTEOARTHRITIS OF RIGHT KNEE: Primary | ICD-10-CM

## 2022-07-05 PROCEDURE — 99999 PR PBB SHADOW E&M-EST. PATIENT-LVL III: ICD-10-PCS | Mod: PBBFAC,,, | Performed by: ORTHOPAEDIC SURGERY

## 2022-07-05 PROCEDURE — 99999 PR PBB SHADOW E&M-EST. PATIENT-LVL III: CPT | Mod: PBBFAC,,, | Performed by: ORTHOPAEDIC SURGERY

## 2022-07-05 PROCEDURE — 99499 NO LOS: ICD-10-PCS | Mod: S$GLB,,, | Performed by: ORTHOPAEDIC SURGERY

## 2022-07-05 PROCEDURE — 20610 LARGE JOINT ASPIRATION/INJECTION: R KNEE: ICD-10-PCS | Mod: RT,S$GLB,, | Performed by: ORTHOPAEDIC SURGERY

## 2022-07-05 PROCEDURE — 99499 UNLISTED E&M SERVICE: CPT | Mod: S$GLB,,, | Performed by: ORTHOPAEDIC SURGERY

## 2022-07-05 PROCEDURE — 20610 DRAIN/INJ JOINT/BURSA W/O US: CPT | Mod: RT,S$GLB,, | Performed by: ORTHOPAEDIC SURGERY

## 2022-07-05 RX ORDER — LOSARTAN POTASSIUM 50 MG/1
50 TABLET ORAL DAILY
Qty: 30 TABLET | Refills: 0 | OUTPATIENT
Start: 2022-07-05

## 2022-07-05 RX ORDER — CELECOXIB 100 MG/1
100 CAPSULE ORAL DAILY
Qty: 90 CAPSULE | Refills: 0 | Status: SHIPPED | OUTPATIENT
Start: 2022-07-05 | End: 2022-11-08 | Stop reason: SDUPTHER

## 2022-07-05 RX ADMIN — TRIAMCINOLONE ACETONIDE 40 MG: 40 INJECTION, SUSPENSION INTRA-ARTICULAR; INTRAMUSCULAR at 11:07

## 2022-07-05 NOTE — TELEPHONE ENCOUNTER
Refill Decision Note   Coy Rosado  is requesting a refill authorization.  Brief Assessment and Rationale for Refill:  Quick Discontinue     Medication Therapy Plan:       Medication Reconciliation Completed: No   Comments:     No Care Gaps recommended.     Note composed:1:01 PM 07/05/2022

## 2022-07-05 NOTE — PROGRESS NOTES
F/u for R knee CSI today    Would like to plan R TKA in December    -BMI now down to 38  -0-120, 5 valg, ttp med/lat/PF, mild-mod eff, no lag    Has tried cbrex, tyl, ibu  S/p CSI + HA  S/p HEP and Rx PT    Continue tylenol  New Rx celebrex today, refills per Dr Weiss    R TKA   [place], IP, ASA, OP PT  [facility]/CS/univ/X3    12/5/22    F/u for pre-op visit prior to surgery

## 2022-08-03 ENCOUNTER — TELEPHONE (OUTPATIENT)
Dept: ORTHOPEDICS | Facility: CLINIC | Age: 52
End: 2022-08-03
Payer: COMMERCIAL

## 2022-08-03 NOTE — TELEPHONE ENCOUNTER
I called the patient today regarding his voice message. I left a message stating that the patient can reach out to the disability office to get his paperwork completed. I provided him with their contact information. I left a message for the patient to call me back. I left my name and phone number.      ----- Message from Chloe Quintana sent at 8/3/2022 12:10 PM CDT -----  Regarding: self [phone]  .Type: Patient Call Back    Who called: self     What is the request in detail: Pt is requesting to speak to nurse in regards to getting paperwork     Can the clinic reply by [facility]? Call back    Would the patient rather a call back or a response via [facility]? Call back    Best call back number: .[phone]

## 2022-08-09 PROBLEM — M17.11 PRIMARY OSTEOARTHRITIS OF RIGHT KNEE: Status: ACTIVE | Noted: 2022-08-09

## 2022-08-10 NOTE — PROGRESS NOTES
Injection Information    Triamcinolone Acetonide Injectable Suspension (40mg/mL)  Lot Number: [id number]   Expiration Date: 1/31/2024

## 2022-08-17 ENCOUNTER — TELEPHONE (OUTPATIENT)
Dept: ORTHOPEDICS | Facility: CLINIC | Age: 52
End: 2022-08-17
Payer: COMMERCIAL

## 2022-08-17 NOTE — TELEPHONE ENCOUNTER
I called the patient to reschedule his appointment per Dr. Wills not being in clinic on 9/29/22. The patient did not answer. I left a voicemail with a call back number.

## 2022-08-20 RX ORDER — TRIAMCINOLONE ACETONIDE 40 MG/ML
40 INJECTION, SUSPENSION INTRA-ARTICULAR; INTRAMUSCULAR
Status: DISCONTINUED | OUTPATIENT
Start: 2022-07-05 | End: 2022-08-20 | Stop reason: HOSPADM

## 2022-08-20 NOTE — PROCEDURES
Large Joint Aspiration/Injection: R knee    Date/Time: 7/5/2022 11:30 AM  Performed by: Shane Wills III, MD  Authorized by: Shane Wills III, MD     Consent Done?:  Yes (Verbal)  Indications:  Pain  Timeout: prior to procedure the correct patient, procedure, and site was verified    Prep: patient was prepped and draped in usual sterile fashion      Local anesthesia used?: Yes    Local anesthetic:  Lidocaine 1% without epinephrine  Anesthetic total (ml):  5      Details:  Needle Size:  21 G  Location:  Knee  Site:  R knee  Medications:  40 mg triamcinolone acetonide 40 mg/mL  Patient tolerance:  Patient tolerated the procedure well with no immediate complications

## 2022-08-31 ENCOUNTER — TELEPHONE (OUTPATIENT)
Dept: ORTHOPEDICS | Facility: CLINIC | Age: 52
End: 2022-08-31
Payer: COMMERCIAL

## 2022-08-31 NOTE — TELEPHONE ENCOUNTER
I called the patient to reschedule his appointment. The patient did not answer. I left a voicemail with a call back number.    ----- Message from Ward Curtis sent at 8/31/2022 10:55 AM CDT -----  Regarding: FW: returning call to reschedule, please call  Contact: [phone]  Good morning [facility],     Please call the patient to reschedule his appointment. Thank you!    We need to see him late September/beginning of October. Please let me know if I need to override him on. He can do 1pm on a day that isn't already double booked.       Thank you!    Sincerely,   Nehemiah Curtis MS, OTC  OR & Clinical Assistant to Dr. Shane Wills III  Phone: [phone]  Fax: [phone]    ----- Message -----  From: Alysa Hunter  Sent: 8/31/2022   8:56 AM CDT  To: Johann GRIMES Staff  Subject: returning call to reschedule, please call        PT returning call to reschedule appt, please call @# [phone]

## 2022-10-13 ENCOUNTER — PATIENT MESSAGE (OUTPATIENT)
Dept: ADMINISTRATIVE | Facility: OTHER | Age: 52
End: 2022-10-13
Payer: COMMERCIAL

## 2022-10-13 ENCOUNTER — OFFICE VISIT (OUTPATIENT)
Dept: ORTHOPEDICS | Facility: CLINIC | Age: 52
End: 2022-10-13
Payer: COMMERCIAL

## 2022-10-13 VITALS — WEIGHT: 255.63 LBS | BODY MASS INDEX: 37.86 KG/M2 | HEIGHT: 69 IN

## 2022-10-13 DIAGNOSIS — M17.11 PRIMARY OSTEOARTHRITIS OF RIGHT KNEE: Primary | ICD-10-CM

## 2022-10-13 PROCEDURE — 99999 PR PBB SHADOW E&M-EST. PATIENT-LVL III: ICD-10-PCS | Mod: PBBFAC,,, | Performed by: ORTHOPAEDIC SURGERY

## 2022-10-13 PROCEDURE — 99999 PR PBB SHADOW E&M-EST. PATIENT-LVL III: CPT | Mod: PBBFAC,,, | Performed by: ORTHOPAEDIC SURGERY

## 2022-10-13 PROCEDURE — 99499 UNLISTED E&M SERVICE: CPT | Mod: S$GLB,,, | Performed by: ORTHOPAEDIC SURGERY

## 2022-10-13 PROCEDURE — 99499 NO LOS: ICD-10-PCS | Mod: S$GLB,,, | Performed by: ORTHOPAEDIC SURGERY

## 2022-10-13 NOTE — PROGRESS NOTES
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 The patient stated that his wife could help take care of him if he were to have surgery.     Exam unchanged 0-120    XR KL g4 PF OA, g3 med and lat compartment    1. Primary osteoarthritis of right knee  M17.11 715.16      BMI 38 down from 40.9   HTN  CHERRY + CPAP  CLBP    This patient has significant symptoms in their knee that are affecting their quality of life and daily activities.  They have tried non-operative treatment including analgesics, an exercise program, and activity modification, but the symptoms have persisted. I believe they make a good candidate for knee arthroplasty.     The risks and benefits of knee arthroplasty have been discussed with the patient which include, but are not limited to infections (including severe sequelae), potential component failure, fracture, DVT, pulmonary embolus, nerve palsy, poor range of motion, the possibility of bone grafting, persistent pain, wound healing complications, transfusions, medical complications and death.     We discussed surgical options including implant type and why I believe the implant selected is a good match for the patient's needs. The patient expressed understanding and agrees to proceed with the planned surgery.     Pre-operative planning will include the following:  A pre-surgical evaluation by will be arranged.  Pre-op orders will be placed.  We will make arrangements with the operating room for proper time and staffing.  We  will make Social Service arrangements for the patient.    Implants:   Company: [employer]  System: [employer]  CR/CS  universal tray  X3 poly       CT scan: LURDES protocol for operative planning   Additional discussion about pin sites: risk of wound healing issues and fracture      DVT prophylaxis: ASA 81mg BID x1 month    Dispo: outpatient PT    Admission status: Inpatient    Location: [facility] 12/5/22

## 2022-11-03 ENCOUNTER — OFFICE VISIT (OUTPATIENT)
Dept: URGENT CARE | Facility: CLINIC | Age: 52
End: 2022-11-03
Payer: COMMERCIAL

## 2022-11-03 VITALS
TEMPERATURE: 98 F | DIASTOLIC BLOOD PRESSURE: 99 MMHG | HEIGHT: 69 IN | BODY MASS INDEX: 37.84 KG/M2 | OXYGEN SATURATION: 99 % | HEART RATE: 89 BPM | RESPIRATION RATE: 18 BRPM | WEIGHT: 255.5 LBS | SYSTOLIC BLOOD PRESSURE: 171 MMHG

## 2022-11-03 DIAGNOSIS — R05.9 COUGH, UNSPECIFIED TYPE: ICD-10-CM

## 2022-11-03 DIAGNOSIS — J40 BRONCHITIS: Primary | ICD-10-CM

## 2022-11-03 LAB
CTP QC/QA: YES
CTP QC/QA: YES
POC MOLECULAR INFLUENZA A AGN: NEGATIVE
POC MOLECULAR INFLUENZA B AGN: NEGATIVE
SARS-COV-2 RDRP RESP QL NAA+PROBE: NEGATIVE

## 2022-11-03 PROCEDURE — 99213 OFFICE O/P EST LOW 20 MIN: CPT | Mod: S$GLB,,,

## 2022-11-03 PROCEDURE — 87502 INFLUENZA DNA AMP PROBE: CPT | Mod: QW,S$GLB,,

## 2022-11-03 PROCEDURE — 87635 SARS-COV-2 COVID-19 AMP PRB: CPT | Mod: QW,S$GLB,,

## 2022-11-03 PROCEDURE — 87502 POCT INFLUENZA A/B MOLECULAR: ICD-10-PCS | Mod: QW,S$GLB,,

## 2022-11-03 PROCEDURE — 99213 PR OFFICE/OUTPT VISIT, EST, LEVL III, 20-29 MIN: ICD-10-PCS | Mod: S$GLB,,,

## 2022-11-03 PROCEDURE — 87635: ICD-10-PCS | Mod: QW,S$GLB,,

## 2022-11-03 RX ORDER — PROMETHAZINE HYDROCHLORIDE AND DEXTROMETHORPHAN HYDROBROMIDE 6.25; 15 MG/5ML; MG/5ML
5 SYRUP ORAL EVERY 6 HOURS PRN
Qty: 118 ML | Refills: 0 | Status: SHIPPED | OUTPATIENT
Start: 2022-11-03 | End: 2022-11-13

## 2022-11-03 NOTE — PROGRESS NOTES
"Subjective:       Patient ID: Coy Rosado Jr. is a 51 y.o. male.    Vitals:  height is 5' 8.5" (1.74 m) and weight is 115.9 kg (255 lb 8.2 oz). His temporal temperature is 98.3 °F (36.8 °C). His blood pressure is 171/99 (abnormal) and his pulse is 89. His respiration is 18 and oxygen saturation is 99%.     Chief Complaint: Cough    51 year old male presents to the urgent care with c/o cough for a week. Patient states that he has tried OTC medications but it hasn't helped him much. Patient describes the cough as yellow in color. Patient states that he was having headaches, nasal congestion, hoarseness and muscle aches. Patient denies SOB, CP, nausea, vomiting, and diarrhea.     Cough  This is a new problem. The current episode started in the past 7 days. The problem has been unchanged. The cough is Productive of sputum. Associated symptoms include headaches and nasal congestion. Pertinent negatives include no chest pain, chills, ear congestion, ear pain, eye redness, fever, hemoptysis, myalgias, postnasal drip, rash, rhinorrhea, sore throat, shortness of breath, sweats or wheezing. Nothing aggravates the symptoms. He has tried OTC cough suppressant for the symptoms. The treatment provided mild relief.     Constitution: Negative for chills, fatigue and fever.   HENT:  Positive for congestion. Negative for ear pain, ear discharge, facial swelling, postnasal drip, sinus pain, sinus pressure, sore throat, trouble swallowing and voice change.    Neck: Negative for neck pain, neck stiffness, painful lymph nodes and neck swelling.   Cardiovascular:  Negative for chest pain, leg swelling, palpitations and sob on exertion.   Eyes:  Negative for eye discharge, eye itching, eye pain and eye redness.   Respiratory:  Positive for cough and sputum production (yellow in color). Negative for bloody sputum, shortness of breath, stridor and wheezing.    Gastrointestinal:  Negative for nausea, vomiting, constipation and diarrhea. "   Genitourinary:  Negative for dysuria, frequency and flank pain.   Musculoskeletal:  Negative for muscle cramps and muscle ache.   Skin:  Negative for rash.   Allergic/Immunologic: Negative for itching and sneezing.   Neurological:  Positive for headaches. Negative for dizziness, light-headedness, passing out, numbness and tingling.   Hematologic/Lymphatic: Negative for swollen lymph nodes.     Objective:      Physical Exam   Constitutional: He is oriented to person, place, and time. He appears well-developed. He is cooperative.  Non-toxic appearance. He does not appear ill. No distress.   HENT:   Head: Normocephalic and atraumatic.   Ears:   Right Ear: Hearing, tympanic membrane, external ear and ear canal normal.   Left Ear: Hearing, tympanic membrane, external ear and ear canal normal.   Nose: Congestion present. No mucosal edema, rhinorrhea or nasal deformity. No epistaxis. Right sinus exhibits no maxillary sinus tenderness and no frontal sinus tenderness. Left sinus exhibits no maxillary sinus tenderness and no frontal sinus tenderness.   Mouth/Throat: Uvula is midline, oropharynx is clear and moist and mucous membranes are normal. No trismus in the jaw. Normal dentition. No uvula swelling. No oropharyngeal exudate, posterior oropharyngeal edema or posterior oropharyngeal erythema.   Eyes: Conjunctivae and lids are normal. Pupils are equal, round, and reactive to light. Right eye exhibits no discharge. Left eye exhibits no discharge. No scleral icterus. Extraocular movement intact   Neck: Trachea normal and phonation normal. Neck supple. No edema present. No erythema present. No neck rigidity present.   Cardiovascular: Normal rate, regular rhythm, normal heart sounds and normal pulses.   No murmur heard.Exam reveals no gallop and no friction rub.   Pulmonary/Chest: Effort normal and breath sounds normal. No stridor. No respiratory distress. He has no decreased breath sounds. He has no wheezes. He has no  rhonchi. He has no rales. He exhibits no tenderness.   Abdominal: Normal appearance. He exhibits no distension. Soft. There is no abdominal tenderness. There is no rebound, no guarding, no left CVA tenderness and no right CVA tenderness.   Musculoskeletal: Normal range of motion.         General: No deformity. Normal range of motion.      Cervical back: He exhibits no tenderness.   Lymphadenopathy:     He has no cervical adenopathy.   Neurological: no focal deficit. He is alert and oriented to person, place, and time. He exhibits normal muscle tone. Coordination normal.   Skin: Skin is warm, dry, intact, not diaphoretic, not pale and no rash. Capillary refill takes less than 2 seconds.   Psychiatric: His speech is normal and behavior is normal. Judgment and thought content normal.   Nursing note and vitals reviewed.      Results for orders placed or performed in visit on 11/03/22   POCT Influenza A/B MOLECULAR   Result Value Ref Range    POC Molecular Influenza A Ag Negative Negative, Not Reported    POC Molecular Influenza B Ag Negative Negative, Not Reported     Acceptable Yes    POCT COVID-19 Rapid Screening   Result Value Ref Range    POC Rapid COVID Negative Negative     Acceptable Yes      Assessment:       1. Bronchitis    2. Cough, unspecified type          Plan:     Previous external notes reviewed.  Vital signs reviewed.  Labs ordered. Labs reviewed.  Discussed bronchitis, home care, tx options, and given follow up precautions  Patient involved with the treatment plan and agreed to the plan.  Patient and provider discussed the risk and benefits of  antibiotics, patient understood that antibiotics were not indicated.     Patient Instructions   Please drink plenty of fluids.  Please get plenty of rest.  Please return here or go to the Emergency Department for any concerns or worsening of condition.  Please take promethazine DM for cough/congestion at night.  If you do take one  of the above, it is ok to combine that with plain over the counter Mucinex or Allegra or Claritin or Zyrtec.   If you do have Hypertension or palpitations, it is safe to take Coricidin HBP for relief of sinus symptoms.  If not allergic, please take over the counter Tylenol (Acetaminophen) and/or Motrin (Ibuprofen) as directed for control of pain and/or fever.  Please follow up with your primary care doctor or specialist as needed.    If you  smoke, please stop smoking.      Bronchitis  -     promethazine-dextromethorphan (PROMETHAZINE-DM) 6.25-15 mg/5 mL Syrp; Take 5 mLs by mouth every 6 (six) hours as needed (cough).  Dispense: 118 mL; Refill: 0    Cough, unspecified type  -     POCT Influenza A/B MOLECULAR  -     POCT COVID-19 Rapid Screening       Ronald Stacy PA-C

## 2022-11-04 DIAGNOSIS — M17.11 OSTEOARTHRITIS OF RIGHT KNEE, UNSPECIFIED OSTEOARTHRITIS TYPE: Primary | ICD-10-CM

## 2022-11-04 NOTE — PATIENT INSTRUCTIONS
Please drink plenty of fluids.  Please get plenty of rest.  Please return here or go to the Emergency Department for any concerns or worsening of condition.  Please take promethazine DM for cough/congestion at night.  If you do take one of the above, it is ok to combine that with plain over the counter Mucinex or Allegra or Claritin or Zyrtec.   If you do have Hypertension or palpitations, it is safe to take Coricidin HBP for relief of sinus symptoms.  If not allergic, please take over the counter Tylenol (Acetaminophen) and/or Motrin (Ibuprofen) as directed for control of pain and/or fever.  Please follow up with your primary care doctor or specialist as needed.    If you  smoke, please stop smoking.

## 2022-11-08 ENCOUNTER — OFFICE VISIT (OUTPATIENT)
Dept: INTERNAL MEDICINE | Facility: CLINIC | Age: 52
End: 2022-11-08
Payer: COMMERCIAL

## 2022-11-08 ENCOUNTER — OFFICE VISIT (OUTPATIENT)
Dept: ORTHOPEDICS | Facility: CLINIC | Age: 52
End: 2022-11-08
Payer: COMMERCIAL

## 2022-11-08 ENCOUNTER — HOSPITAL ENCOUNTER (OUTPATIENT)
Dept: CARDIOLOGY | Facility: CLINIC | Age: 52
Discharge: HOME | End: 2022-11-08
Payer: COMMERCIAL

## 2022-11-08 ENCOUNTER — HOSPITAL ENCOUNTER (OUTPATIENT)
Dept: RADIOLOGY | Facility: HOSPITAL | Age: 52
Discharge: HOME | End: 2022-11-08
Attending: ORTHOPAEDIC SURGERY
Payer: COMMERCIAL

## 2022-11-08 ENCOUNTER — HOSPITAL ENCOUNTER (OUTPATIENT)
Dept: RADIOLOGY | Facility: HOSPITAL | Age: 52
Discharge: HOME | End: 2022-11-08
Attending: NURSE PRACTITIONER
Payer: COMMERCIAL

## 2022-11-08 VITALS — HEIGHT: 69 IN | BODY MASS INDEX: 37.87 KG/M2 | WEIGHT: 255.69 LBS

## 2022-11-08 VITALS
OXYGEN SATURATION: 99 % | DIASTOLIC BLOOD PRESSURE: 89 MMHG | HEART RATE: 73 BPM | TEMPERATURE: 98 F | WEIGHT: 257 LBS | BODY MASS INDEX: 38.95 KG/M2 | HEIGHT: 68 IN | SYSTOLIC BLOOD PRESSURE: 152 MMHG

## 2022-11-08 DIAGNOSIS — M54.40 CHRONIC LOW BACK PAIN WITH SCIATICA, SCIATICA LATERALITY UNSPECIFIED, UNSPECIFIED BACK PAIN LATERALITY: ICD-10-CM

## 2022-11-08 DIAGNOSIS — M17.11 PRIMARY OSTEOARTHRITIS OF RIGHT KNEE: ICD-10-CM

## 2022-11-08 DIAGNOSIS — G89.29 CHRONIC LOW BACK PAIN WITH SCIATICA, SCIATICA LATERALITY UNSPECIFIED, UNSPECIFIED BACK PAIN LATERALITY: ICD-10-CM

## 2022-11-08 DIAGNOSIS — M17.11 OSTEOARTHRITIS OF RIGHT KNEE, UNSPECIFIED OSTEOARTHRITIS TYPE: ICD-10-CM

## 2022-11-08 DIAGNOSIS — M79.604 PAIN OF RIGHT LOWER EXTREMITY: Primary | ICD-10-CM

## 2022-11-08 DIAGNOSIS — I10 HYPERTENSION, UNSPECIFIED TYPE: ICD-10-CM

## 2022-11-08 DIAGNOSIS — M17.11 PRIMARY OSTEOARTHRITIS OF RIGHT KNEE: Primary | ICD-10-CM

## 2022-11-08 DIAGNOSIS — E66.01 CLASS 2 SEVERE OBESITY WITH SERIOUS COMORBIDITY AND BODY MASS INDEX (BMI) OF 39.0 TO 39.9 IN ADULT, UNSPECIFIED OBESITY TYPE: ICD-10-CM

## 2022-11-08 DIAGNOSIS — Z86.39 PERSONAL HISTORY OF DIABETES MELLITUS: ICD-10-CM

## 2022-11-08 DIAGNOSIS — G47.33 OSA (OBSTRUCTIVE SLEEP APNEA): ICD-10-CM

## 2022-11-08 PROBLEM — E66.812 CLASS 2 SEVERE OBESITY WITH SERIOUS COMORBIDITY AND BODY MASS INDEX (BMI) OF 39.0 TO 39.9 IN ADULT: Status: ACTIVE | Noted: 2022-11-08

## 2022-11-08 PROCEDURE — 73700 CT LOWER EXTREMITY W/O DYE: CPT | Mod: TC,RT

## 2022-11-08 PROCEDURE — 99204 OFFICE O/P NEW MOD 45 MIN: CPT | Mod: S$GLB,,, | Performed by: NURSE PRACTITIONER

## 2022-11-08 PROCEDURE — 99999 PR PBB SHADOW E&M-EST. PATIENT-LVL III: ICD-10-PCS | Mod: PBBFAC,,, | Performed by: NURSE PRACTITIONER

## 2022-11-08 PROCEDURE — 93005 EKG 12-LEAD: ICD-10-PCS | Mod: S$GLB,,, | Performed by: NURSE PRACTITIONER

## 2022-11-08 PROCEDURE — 99214 PR OFFICE/OUTPT VISIT, EST, LEVL IV, 30-39 MIN: ICD-10-PCS | Mod: S$GLB,,, | Performed by: NURSE PRACTITIONER

## 2022-11-08 PROCEDURE — 99999 PR PBB SHADOW E&M-EST. PATIENT-LVL V: ICD-10-PCS | Mod: PBBFAC,,, | Performed by: NURSE PRACTITIONER

## 2022-11-08 PROCEDURE — 73700 CT LOWER EXTREMITY W/O DYE: CPT | Mod: 26,RT,, | Performed by: RADIOLOGY

## 2022-11-08 PROCEDURE — 93005 ELECTROCARDIOGRAM TRACING: CPT | Mod: S$GLB,,, | Performed by: NURSE PRACTITIONER

## 2022-11-08 PROCEDURE — 73560 X-RAY EXAM OF KNEE 1 OR 2: CPT | Mod: TC,RT

## 2022-11-08 PROCEDURE — 93010 EKG 12-LEAD: ICD-10-PCS | Mod: S$GLB,,, | Performed by: INTERNAL MEDICINE

## 2022-11-08 PROCEDURE — 99214 OFFICE O/P EST MOD 30 MIN: CPT | Mod: S$GLB,,, | Performed by: NURSE PRACTITIONER

## 2022-11-08 PROCEDURE — 99999 PR PBB SHADOW E&M-EST. PATIENT-LVL V: CPT | Mod: PBBFAC,,, | Performed by: NURSE PRACTITIONER

## 2022-11-08 PROCEDURE — 93010 ELECTROCARDIOGRAM REPORT: CPT | Mod: S$GLB,,, | Performed by: INTERNAL MEDICINE

## 2022-11-08 PROCEDURE — 73560 X-RAY EXAM OF KNEE 1 OR 2: CPT | Mod: 26,RT,, | Performed by: RADIOLOGY

## 2022-11-08 PROCEDURE — 73700 CT KNEE WITHOUT CONTRAST RIGHT: ICD-10-PCS | Mod: 26,RT,, | Performed by: RADIOLOGY

## 2022-11-08 PROCEDURE — 73560 XR KNEE 1 OR 2 VIEW RIGHT: ICD-10-PCS | Mod: 26,RT,, | Performed by: RADIOLOGY

## 2022-11-08 PROCEDURE — 99999 PR PBB SHADOW E&M-EST. PATIENT-LVL III: CPT | Mod: PBBFAC,,, | Performed by: NURSE PRACTITIONER

## 2022-11-08 PROCEDURE — 99204 PR OFFICE/OUTPT VISIT, NEW, LEVL IV, 45-59 MIN: ICD-10-PCS | Mod: S$GLB,,, | Performed by: NURSE PRACTITIONER

## 2022-11-08 RX ORDER — FENTANYL CITRATE 50 UG/ML
25 INJECTION, SOLUTION INTRAMUSCULAR; INTRAVENOUS EVERY 5 MIN PRN
Status: CANCELLED | OUTPATIENT
Start: 2022-11-08

## 2022-11-08 RX ORDER — NALOXONE HCL 0.4 MG/ML
0.02 VIAL (ML) INJECTION
Status: CANCELLED | OUTPATIENT
Start: 2022-11-08

## 2022-11-08 RX ORDER — PROCHLORPERAZINE EDISYLATE 5 MG/ML
5 INJECTION INTRAMUSCULAR; INTRAVENOUS EVERY 6 HOURS PRN
Status: CANCELLED | OUTPATIENT
Start: 2022-11-08

## 2022-11-08 RX ORDER — ACETAMINOPHEN 500 MG
1000 TABLET ORAL
Status: CANCELLED | OUTPATIENT
Start: 2022-11-08

## 2022-11-08 RX ORDER — OXYCODONE HYDROCHLORIDE 5 MG/1
10 TABLET ORAL
Status: CANCELLED | OUTPATIENT
Start: 2022-11-08

## 2022-11-08 RX ORDER — ACETAMINOPHEN 500 MG
1000 TABLET ORAL EVERY 6 HOURS
Status: CANCELLED | OUTPATIENT
Start: 2022-11-08

## 2022-11-08 RX ORDER — POLYETHYLENE GLYCOL 3350 17 G/17G
17 POWDER, FOR SOLUTION ORAL DAILY
Status: CANCELLED | OUTPATIENT
Start: 2022-11-08

## 2022-11-08 RX ORDER — ONDANSETRON 2 MG/ML
4 INJECTION INTRAMUSCULAR; INTRAVENOUS EVERY 8 HOURS PRN
Status: CANCELLED | OUTPATIENT
Start: 2022-11-08

## 2022-11-08 RX ORDER — METHOCARBAMOL 750 MG/1
750 TABLET, FILM COATED ORAL 3 TIMES DAILY
Status: CANCELLED | OUTPATIENT
Start: 2022-11-08

## 2022-11-08 RX ORDER — MUPIROCIN 20 MG/G
1 OINTMENT TOPICAL
Status: CANCELLED | OUTPATIENT
Start: 2022-11-08

## 2022-11-08 RX ORDER — CELECOXIB 200 MG/1
200 CAPSULE ORAL DAILY
Status: CANCELLED | OUTPATIENT
Start: 2022-11-08

## 2022-11-08 RX ORDER — FENTANYL CITRATE 50 UG/ML
100 INJECTION, SOLUTION INTRAMUSCULAR; INTRAVENOUS
Status: CANCELLED | OUTPATIENT
Start: 2022-11-08 | End: 2022-11-09

## 2022-11-08 RX ORDER — SODIUM CHLORIDE 9 MG/ML
INJECTION, SOLUTION INTRAVENOUS
Status: CANCELLED | OUTPATIENT
Start: 2022-11-08

## 2022-11-08 RX ORDER — MORPHINE SULFATE 2 MG/ML
2 INJECTION, SOLUTION INTRAMUSCULAR; INTRAVENOUS
Status: CANCELLED | OUTPATIENT
Start: 2022-11-08

## 2022-11-08 RX ORDER — AMOXICILLIN 250 MG
1 CAPSULE ORAL 2 TIMES DAILY
Status: CANCELLED | OUTPATIENT
Start: 2022-11-08

## 2022-11-08 RX ORDER — MIDAZOLAM HYDROCHLORIDE 1 MG/ML
4 INJECTION INTRAMUSCULAR; INTRAVENOUS
Status: CANCELLED | OUTPATIENT
Start: 2022-11-08 | End: 2022-11-09

## 2022-11-08 RX ORDER — SODIUM CHLORIDE 9 MG/ML
INJECTION, SOLUTION INTRAVENOUS CONTINUOUS
Status: CANCELLED | OUTPATIENT
Start: 2022-11-08 | End: 2022-11-09

## 2022-11-08 RX ORDER — ASPIRIN 81 MG/1
81 TABLET ORAL 2 TIMES DAILY
Status: CANCELLED | OUTPATIENT
Start: 2022-11-08

## 2022-11-08 RX ORDER — MUPIROCIN 20 MG/G
1 OINTMENT TOPICAL 2 TIMES DAILY
Status: CANCELLED | OUTPATIENT
Start: 2022-11-08 | End: 2022-11-13

## 2022-11-08 RX ORDER — BISACODYL 10 MG
10 SUPPOSITORY, RECTAL RECTAL EVERY 12 HOURS PRN
Status: CANCELLED | OUTPATIENT
Start: 2022-11-08

## 2022-11-08 RX ORDER — FAMOTIDINE 20 MG/1
20 TABLET, FILM COATED ORAL 2 TIMES DAILY
Status: CANCELLED | OUTPATIENT
Start: 2022-11-08

## 2022-11-08 RX ORDER — PREGABALIN 75 MG/1
75 CAPSULE ORAL NIGHTLY
Status: CANCELLED | OUTPATIENT
Start: 2022-11-08

## 2022-11-08 RX ORDER — TALC
6 POWDER (GRAM) TOPICAL NIGHTLY PRN
Status: CANCELLED | OUTPATIENT
Start: 2022-11-08

## 2022-11-08 RX ORDER — SODIUM CHLORIDE 0.9 % (FLUSH) 0.9 %
10 SYRINGE (ML) INJECTION
Status: CANCELLED | OUTPATIENT
Start: 2022-11-08

## 2022-11-08 RX ORDER — OXYCODONE HYDROCHLORIDE 5 MG/1
5 TABLET ORAL
Status: CANCELLED | OUTPATIENT
Start: 2022-11-08

## 2022-11-08 RX ORDER — PREGABALIN 75 MG/1
75 CAPSULE ORAL
Status: CANCELLED | OUTPATIENT
Start: 2022-11-08

## 2022-11-08 RX ORDER — CELECOXIB 200 MG/1
400 CAPSULE ORAL ONCE
Status: CANCELLED | OUTPATIENT
Start: 2022-11-08 | End: 2022-11-08

## 2022-11-08 RX ORDER — LIDOCAINE HYDROCHLORIDE 10 MG/ML
1 INJECTION, SOLUTION EPIDURAL; INFILTRATION; INTRACAUDAL; PERINEURAL
Status: CANCELLED | OUTPATIENT
Start: 2022-11-08

## 2022-11-08 NOTE — ASSESSMENT & PLAN NOTE
Reports history of DM; treated with Metformin in past.  A1c is 5.6   Unclear as to why he was taken off of medication.   I recommend monitoring patient's glucose level during the perioperative period. Glucose may be elevated from stress hyperglycemia and may require insulin.

## 2022-11-08 NOTE — ASSESSMENT & PLAN NOTE
CPAP compliance: Yes.   Encouraged weight loss, increased exercise.  Recommend caution with sedating medication that can cause respiratory depression.

## 2022-11-08 NOTE — DISCHARGE INSTRUCTIONS
Your surgery has been scheduled for:_______12/5/22___________________________________    You should report to:  ____[facility], located on the [place] side of the first floor of the           [facility] ([phone])  ____[facility], located on the [facility] side of the            Second floor of the [facility] ([phone])  ____3rd Floor [facility] located on the [facility] side of the [facility] [phone]  __X__[facility]: located at [address] [place] [address]. Check in on the first floor of the hospital.  Please Note   Tell your doctor if you take Aspirin, products containing Aspirin, herbal medications  or blood thinners, such as Coumadin, Ticlid, or Plavix.  (Consult your provider regarding holding or stopping before surgery).  Arrange for someone to drive you home following surgery.  You will not be allowed to leave the surgical facility alone or drive yourself home following sedation and anesthesia.  Before Surgery  Stop taking all herbal medications 7 days prior to surgery  No Motrin/Advil (Ibuprofen)  7 day before surgery  No Aleve (Naproxen) 7 days before surgery  Stop Taking Asprin, products containing Aspirin __7___days before surgery  No Goody's/BC  Powder 7 days before surgery  Refrain from drinking alcoholic beverages for 24 hours before and after surgery  Stop or limit smoking at least 24 hours prior to surgery  You may take Tylenol for pain  Night before Surgery  Do not eat or drink after midnight  Take a shower or bath (shower is recommended).  Bathe with Hibiclens soap or an antibacterial soap from the neck down.  If not supplied by your surgeon, hibiclens soap will need to be purchased over the counter in pharmacy.  Rinse soap off thoroughly.  Shampoo your hair with your regular shampoo  The Day of Surgery  Take another bath or shower with hibiclens or  any antibacterial soap, to reduce the chance of infection.  Take heart and blood pressure medications with a small sip of water, as advised by the perioperative team.  Do not take fluid pills  You may brush your teeth and rinse your mouth, but do not swall any additional water.   Do not apply perfumes, powder, body lotions or deodorant on the day of surgery.  Nail polish should be removed.  Do not wear makeup or moisturizer  Wear comfortable clothes, such as a button front shirt and loose fitting pants.  Leave all jewelry, including body piercings, and valuables at home.    Bring any devices you will neeed after surgery such as crutches or canes.  If you have sleep apnea, please bring your CPAP machine  In the event that your physical condition changes including the onset of a cold or respiratory illness, or if you have to delay or cancel your surgery, please notify your surgeon.

## 2022-11-08 NOTE — PROGRESS NOTES
[facility]  Progress Note    Patient Name: Coy Rosado Jr.  MRN: [id number]  Date of Evaluation- 11/09/2022  PCP- Shorty Weiss MD    Future cases for Coy Rosado Jr. [[id number]]       Case ID Status Date Time Josh Procedure Provider Location    [id number] [facility] 12/5/2022  7:00  ARTHROPLASTY, KNEE, TOTAL, USING COMPUTER-ASSISTED NAVIGATION: LURDES: RIGHT: MARTHA - TRIATHALON Shane iWlls III, MD [[id number]] [facility]            HPI:  This is a 51 y.o. male  who presents today for a preoperative evaluation in preparation for an Orthopedic  procedure.  Scheduled for  right knee arthroplasty.  Surgery is indicated for osteoarthritis of right knee.   Patient is new to me.  Details of current problem: The duration of problem is 3-4 years. Denies trauma. Reports steroid injections with some relief.     Reports symptoms of intermittent sharp aching pain to right knee.   Aggravating factors include:  stair climbing, getting in/out car, prolonged walking, and decreased ADLs.   Relieving factors are  Celebrex/Tylenol Arthritis prn.   Denies pain today; no use of assistive  devices.   The history has been obtained by speaking with the patient and reviewing the electronic medical record and/or outside health information. Significant health conditions for the perioperative period are discussed below in assessment and plan.     Patient reports current health status to be Good.  Denies any new symptoms before surgery.        Subjective/ Objective:     Chief Complaint: Preoperative evaulation, perioperative medical management, and complication reduction plan.     Functional Capacity: can walk up one flight of stairs without CP/SOB.       Anesthesia issues: None    Difficulty mouth opening: No    Steroid use in the last 12 months: No     Dental Issues: No    Family anesthesia difficulty: None       Family Hx of Thrombosis: None    Past Medical History:   Diagnosis Date    Allergy     Diabetes mellitus, type 2     HTN  (hypertension)     Low back pain          Past Medical History Pertinent Negatives:   Diagnosis Date Noted    Anemia 11/08/2022    Anxiety 11/08/2022    Coronary artery disease 11/08/2022    Deep vein thrombosis 11/08/2022    Depression 11/08/2022    Disorder of kidney and ureter 11/08/2022    GERD (gastroesophageal reflux disease) 11/08/2022    Hyperlipidemia 11/08/2022    Pulmonary embolism 11/08/2022    Seizures 11/08/2022    Stroke 11/08/2022    Thyroid disease 11/08/2022         Past Surgical History:   Procedure Laterality Date    COLONOSCOPY      leg laceration  1984       Review of Systems   Constitutional:  Negative for chills, fatigue, fever and unexpected weight change.   HENT:  Positive for congestion. Negative for dental problem, hearing loss, postnasal drip, rhinorrhea, sore throat, tinnitus and trouble swallowing.    Eyes:  Negative for photophobia, pain, discharge and visual disturbance.   Respiratory:  Positive for cough (productive). Negative for apnea, chest tightness, shortness of breath and wheezing.    Cardiovascular:  Negative for chest pain, palpitations and leg swelling.   Gastrointestinal:  Negative for abdominal pain, blood in stool, constipation, nausea and vomiting.        Denies Fatty liver, Hepatitis   Endocrine: Positive for cold intolerance. Negative for heat intolerance.   Genitourinary:  Negative for decreased urine volume, difficulty urinating, dysuria, frequency, hematuria and urgency.   Musculoskeletal:  Positive for arthralgias (right knee) and back pain. Negative for neck pain and neck stiffness.   Skin:  Negative for rash and wound.   Neurological:  Positive for headaches (occasional). Negative for dizziness, tremors, seizures, syncope, weakness and numbness.   Hematological:  Negative for adenopathy. Does not bruise/bleed easily.   Psychiatric/Behavioral:  Negative for confusion, sleep disturbance and suicidal ideas.             VITALS  Visit Vitals  BP (!) 152/89 (BP  "Location: Right arm, Patient Position: Sitting)   Pulse 73   Temp 98.2 °F (36.8 °C) (Oral)   Ht 5' 8" (1.727 m)   Wt 116.6 kg (257 lb)   SpO2 99%   BMI 39.08 kg/m²          Physical Exam  Vitals reviewed.   Constitutional:       General: He is not in acute distress.     Appearance: He is well-developed. He is obese.   HENT:      Head: Normocephalic.      Nose: Nose normal.      Mouth/Throat:      Pharynx: No oropharyngeal exudate.   Eyes:      General:         Right eye: No discharge.         Left eye: No discharge.      Conjunctiva/sclera: Conjunctivae normal.      Pupils: Pupils are equal, round, and reactive to light.   Neck:      Thyroid: No thyromegaly.      Vascular: No carotid bruit or JVD.      Trachea: No tracheal deviation.   Cardiovascular:      Rate and Rhythm: Normal rate and regular rhythm.      Pulses:           Carotid pulses are 2+ on the right side and 2+ on the left side.       Dorsalis pedis pulses are 2+ on the right side and 2+ on the left side.        Posterior tibial pulses are 2+ on the right side and 2+ on the left side.      Heart sounds: Normal heart sounds. No murmur heard.  Pulmonary:      Effort: Pulmonary effort is normal. No respiratory distress.      Breath sounds: Normal breath sounds. No stridor. No wheezing, rhonchi or rales.   Abdominal:      General: Bowel sounds are normal. There is no distension.      Palpations: Abdomen is soft.      Tenderness: There is no abdominal tenderness. There is no guarding.      Comments: central obesity   Musculoskeletal:      Cervical back: Normal range of motion.      Right lower leg: Edema (trace) present.      Left lower leg: No edema.   Lymphadenopathy:      Cervical: No cervical adenopathy.   Skin:     General: Skin is warm and dry.      Capillary Refill: Capillary refill takes less than 2 seconds.      Findings: No erythema or rash.   Neurological:      Mental Status: He is alert and oriented to person, place, and time.      Coordination: " Coordination normal.        Significant Labs:  Lab Results   Component Value Date    WBC 7.43 11/08/2022    HGB 14.5 11/08/2022    HCT 44.0 11/08/2022     11/08/2022    ALT 17 03/01/2021    AST 12 03/01/2021     11/08/2022    K 4.4 11/08/2022     11/08/2022    CREATININE 0.9 11/08/2022    BUN 12 11/08/2022    CO2 29 11/08/2022    TSH 1.025 03/01/2021    INR 1.0 11/08/2022    HGBA1C 5.6 11/08/2022       Diagnostic Studies: No relevant studies.    EKG:   Results for orders placed or performed during the hospital encounter of 11/08/22   EKG 12-lead    Collection Time: 11/08/22 12:23 PM    Narrative    Test Reason : I10,    Vent. Rate : 063 BPM     Atrial Rate : 063 BPM     P-R Int : 140 ms          QRS Dur : 086 ms      QT Int : 420 ms       P-R-T Axes : 044 069 049 degrees     QTc Int : 429 ms    Normal sinus rhythm  Normal ECG  When compared with ECG of 13-SEP-2006 06:06,  No significant change was found  Confirmed by PAVEL VAZ MD ([id number]) on 11/8/2022 12:52:14 PM    Referred By: JACE MCGINNIS           Confirmed By:PAVEL VAZ MD           Active Cardiac Conditions: None      Revised Cardiac Risk Index   High -Risk Surgery  Intraperitoneal; Intrathoracic; suprainguinal vascular Yes- + 1 No- 0   History of Ischemic Heart Disease   (Hx of MI/positive exercise test/current chest pain due to ischemia/use of nitrate therapy/EKG with pathological Q waves) Yes- + 1 No- 0   History of CHF  (Pulmonary edema/bilateral rales or S3 gallop/PND/CXR showing pulmonary vascular redistribution) Yes- + 1 No- 0   History of CVA   (Prior stroke or TIA) Yes- + 1 No- 0   Pre-operative treatment with insulin Yes- + 1 No- 0   Pre-operative creatinine > 2mg/dl Yes- + 1 No- 0   Total: 0      Risk Status:  Estimated risk of cardiac complications after non-cardiac surgery using the Revised Cardiac Risk Index for Preoperative risk is 3.9 %      ARISCAT (Canet) risk index: Low: 1.6% risk of post-op pulmonary  complications.    American Society of Anesthesiologists Physical Status classification (ASA): 2    [facility] failure index: 0.5 %         No further cardiac workup needed prior to surgery.        Orders Placed This Encounter    Basic Metabolic Panel    CBC Without Differential    Protime-INR    Hemoglobin A1C    EKG 12-lead           Assessment/Plan:     HTN (hypertension)  Current BP  not at goal today; 156/104 and 152/89.    Clinic readings since September 2021: Systolic (131-140) and Diastolic (84-94).  Taking: losartan  Patient reports home BP readings of: 130s/70s-80s  Keeping a healthy weight/BMI can help with better BP control    Lifestyle changes to reduce systolic BP:   exercise 30 minutes per day,  5 days per week or 150 minutes weekly; sodium reduction and avoidance of high salt foods such as processed meats, frozen meals and  fast foods.     I recommend monitoring BP during perioperative period as uncontrolled pain can elevate blood pressure.           Low back pain  Occasional pain   No loss of bladder or bowel control    Denies N/T to buttocks, perineum and inner surfaces of the thighs (saddle anesthesia)    Not followed per Pain Management    Recent imaging: None available    Primary osteoarthritis of right knee  Scheduled with Dr. Wills 12/5/22 for right knee arthroplasty.    CHERRY (obstructive sleep apnea)  CPAP compliance: Yes.   Encouraged weight loss, increased exercise.  Recommend caution with sedating medication that can cause respiratory depression.           Personal history of diabetes mellitus  Reports history of DM; treated with Metformin in past.  A1c is 5.6   Unclear as to why he was taken off of medication.   I recommend monitoring patient's glucose level during the perioperative period. Glucose may be elevated from stress hyperglycemia and may require insulin.    Class 2 severe obesity with serious comorbidity and body mass index (BMI) of 39.0 to 39.9 in adult  Patient would  benefit from weight loss and has not set goals to achieve success.   Lifestyle changes should be made by eating healthy, exercising at least 150 minutes weekly, and avoiding sedentary behavior.         Discussion/Management of Perioperative Care    Thromboembolic prophylaxis (VTE) Care: Risk factors for thrombosis include: obesity and surgical procedure.  I recommend prophylaxis of thromboembolism with the use of compression stockings/pneumatic devices, and/or pharmacologic agents. The benefits should outweigh the risks for pharmacologic prophylaxis in the perioperative period. I also encourage early ambulation if not contraindicated during the post-operative period.    Risk factors for post-operative pulmonary complications include:HTN and surgery > 3 hours. To reduce the risk of pulmonary complications, prophylactic recommendations include: incentive spirometry use/deep breathing, early ambulation, and pain control.    Risk factors for renal complications include: HTN. To reduce the risk of postoperative renal complications, I recommend the patient maintain adequate fluid volume status by drinking 2 liters of water daily.  Avoid/reduce NSAIDS and CORTEZ-2 inhibitors use as well as IV contrast for renal protection.    I recommend the use of appropriate prophylactic antibiotics to reduce the risk of surgical site infections.    The patient is at an increased risk for urinary retention due to : possible regional anesthesia. I recommend to avoid/decrease the use of benzodiazepines, anticholinergics, and Benadryl in the perioperative period. I also recommend using opioids for the shortest period of time if possible.          This visit was focused on Preoperative evaluation, Perioperative Medical management, complication reduction plans. I suggest that the patient follows up with primary care or relevant sub specialists for ongoing health care.    I appreciate the opportunity to be involved in this patients care. Please  feel free to contact me if there were any questions about this consultation.    Patient is optimized for surgery.   Labs/EKG acceptable     Víctor Stacy NP  Perioperative Medicine  [facility]

## 2022-11-08 NOTE — PROGRESS NOTES
Coy Rosado Jr. is a 51 y.o. year old here today for pre surgery optimization visit  in preparation for a Right total knee arthroplasty to be performed by Dr. Wills  on 12/5/2022.  he was last seen and treated in the clinic on 10/13/2022. he will be medically optimized by the pre op center. There has been no significant change in medical status since last visit. No fever, chills, malaise, or unexplained weight change.      Allergies, Medications, past medical and surgical history reviewed.    Focused examination performed.    Patient declined to see surgeon today. All questions answered. Patient encouraged to call with questions. Contact information given.     Pre, josé miguel, and post operative procedures and expectations discussed. Goals of successful surgery reviewed and include manageable pain levels, surgical site free of infection, medication management, and ambulation with PT/OT assistance. Healthy weight management discussed with patient and caregiver who were receptive to eduction of healthy diet and activity. No other necessary lifestyle changes identified. Educated patient about signs and symptoms of infection, medication management, anticoagulation therapy, risk of tobacco and alcohol use, and self-care to promote healing. Surgical guide given for future reference. Hibiclens given to patient with instructions. All questions were answered.     Coy Rosado Jr. verbalized an understanding to the education and goals. Patient has displayed readiness to engage in care and is ready to proceed with surgery.  Patient reports his wife is able and ready to provide assistance at home after discharge.    Surgical and blood consents signed.    Coy Rosado Jr. will contact us if there are any questions, concerns, or changes in medical status prior to surgery.       Joint class: completed via Zoom    Patient has discussed discharge planning with surgeon. Patient will be discharged to home following surgery.   patient will be  scheduled with [facility]. He will go to the [place] location    30 minutes of time was spent on patient education, review of records, templating, H&P, , appointment scheduling and optimizing patient for surgery.

## 2022-11-08 NOTE — HPI
This is a 51 y.o. male  who presents today for a preoperative evaluation in preparation for an Orthopedic  procedure.  Scheduled for  right knee arthroplasty.  Surgery is indicated for osteoarthritis of right knee.   Patient is new to me.  Details of current problem: The duration of problem is 3-4 years. Denies trauma. Reports steroid injections with some relief.     Reports symptoms of intermittent sharp aching pain to right knee.   Aggravating factors include:  stair climbing, getting in/out car, prolonged walking, and decreased ADLs.   Relieving factors are  Celebrex/Tylenol Arthritis prn.   Denies pain today; no use of assistive  devices.   The history has been obtained by speaking with the patient and reviewing the electronic medical record and/or outside health information. Significant health conditions for the perioperative period are discussed below in assessment and plan.     Patient reports current health status to be Good.  Denies any new symptoms before surgery.

## 2022-11-08 NOTE — ASSESSMENT & PLAN NOTE
Current BP  not at goal today; 156/104 and 152/89.    Clinic readings since September 2021: Systolic (131-140) and Diastolic (84-94).  Taking: losartan  Patient reports home BP readings of: 130s/70s-80s  Keeping a healthy weight/BMI can help with better BP control    Lifestyle changes to reduce systolic BP:   exercise 30 minutes per day,  5 days per week or 150 minutes weekly; sodium reduction and avoidance of high salt foods such as processed meats, frozen meals and  fast foods.     I recommend monitoring BP during perioperative period as uncontrolled pain can elevate blood pressure.

## 2022-11-08 NOTE — ASSESSMENT & PLAN NOTE
Occasional pain   No loss of bladder or bowel control    Denies N/T to buttocks, perineum and inner surfaces of the thighs (saddle anesthesia)    Not followed per Pain Management    Recent imaging: None available

## 2022-11-08 NOTE — H&P
CC:  Right knee pain    Coy Rosado Jr. is a 51 y.o. male with history of Right knee pain. Pain is worse with activity and weight bearing.  Patient has experienced interference of activities of daily living due to decreased range of motion and an increase in joint pain and swelling.  Patient has failed non-operative treatment including NSAIDs, corticosteroid injections, viscosupplement injections, and activity modification.  Coy Rosdao Jr. currently ambulates independently.     Relevant medical conditions of significance in perioperative period:  HTN- on medication managed by pcp  CHERRY- on CPAP     Given documented medical comorbidities including those listed above and based off of CMS criteria patient would meet inpatient admission status guidelines. This case has been approved as inpatient.     Past Medical History:   Diagnosis Date    HTN (hypertension)     Low back pain        Past Surgical History:   Procedure Laterality Date    leg laceration  1984       Family History   Problem Relation Age of Onset    Bone cancer Mother     Diabetes type II Father        Review of patient's allergies indicates:  No Known Allergies      Current Outpatient Medications:     celecoxib (CELEBREX) 100 MG capsule, TAKE 1 CAPSULE BY MOUTH TWICE DAILY AS NEEDED FOR PAIN, Disp: 60 capsule, Rfl: 0    losartan (COZAAR) 50 MG tablet, Take 1 tablet by mouth once daily, Disp: 30 tablet, Rfl: 0    promethazine-dextromethorphan (PROMETHAZINE-DM) 6.25-15 mg/5 mL Syrp, Take 5 mLs by mouth every 6 (six) hours as needed (cough)., Disp: 118 mL, Rfl: 0    celecoxib (CELEBREX) 100 MG capsule, Take 1 capsule (100 mg total) by mouth once daily., Disp: 90 capsule, Rfl: 0    Current Facility-Administered Medications:     acetaminophen tablet 650 mg, 650 mg, Oral, Once PRN, Juwan Bennett MD    albuterol inhaler 2 puff, 2 puff, Inhalation, Q20 Min PRN, Juwan Bennett MD    diphenhydrAMINE injection 25 mg, 25 mg, Intravenous, Once PRN, Juwan LUCERO  "MD Sabrina    EPINEPHrine (EPIPEN) 0.3 mg/0.3 mL pen injection 0.3 mg, 0.3 mg, Intramuscular, PRN, Juwan Bennett MD    hyaluronate sodium, stabilized (MONOVISC) Syrg, , Intra-articular, 1 time in Clinic/HOD, Shane Wills III, MD    hylan g-f 20 (SYNVISC ONE) 48 mg/6 mL injection 48 mg, 48 mg, Intra-articular, 1 time in Clinic/HOD, Shane Wills III, MD    methylPREDNISolone sodium succinate injection 40 mg, 40 mg, Intravenous, Once PRN, Juwan Bennett MD    ondansetron disintegrating tablet 4 mg, 4 mg, Oral, Once PRN, Juwan Bennett MD    sodium chloride 0.9% 500 mL flush bag, , Intravenous, PRN, Juwan Bennett MD    sodium chloride 0.9% flush 10 mL, 10 mL, Intravenous, PRN, Juwan Bennett MD    Facility-Administered Medications Ordered in Other Visits:     triamcinolone acetonide injection 40 mg, 40 mg, Intra-articular, , Shane Wills III, MD, 40 mg at 04/07/22 1100    Review of Systems:  Constitutional: no fever or chills  Eyes: no visual changes  ENT: no nasal congestion or sore throat  Respiratory: no cough or shortness of breath  Cardiovascular: no chest pain or palpitations  Gastrointestinal: no nausea or vomiting, tolerating diet  Genitourinary: no hematuria or dysuria  Integument/Breast: no rash or pruritis  Hematologic/Lymphatic: no easy bruising or lymphadenopathy  Musculoskeletal: positive for knee pain  Neurological: no seizures or tremors  Behavioral/Psych: no auditory or visual hallucinations  Endocrine: no heat or cold intolerance    PE:  Ht 5' 8.5" (1.74 m)   Wt 116 kg (255 lb 11.2 oz)   BMI 38.31 kg/m²   General: Pleasant, cooperative, NAD   Gait: antalgic  HEENT: NCAT, sclera nonicteric   Lungs: Respirations clear bilaterally; equal and unlabored.   CV: S1S2; 2+ bilateral upper and lower extremity pulses.   Skin: Intact throughout with no rashes, erythema, or lesions  Extremities: No LE edema,  no erythema or warmth of the skin in either lower " extremity.    Right knee exam:  Knee Range of Motion:normal, pain with passive range of motion  Effusion:none  Condition of skin:intact  Location of tenderness:Medial joint line   Strength:normal  Stability: stable to testing    Hip Examination: painless PROM of hip     Radiographs: Radiographs reveal advanced degenerative changes including subchondral cyst formation, subchondral sclerosis, osteophyte formation, joint space narrowing.     Knee Alignment: normal    Diagnosis: Primary osteoarthritis Right knee    Plan: Right total knee arthroplasty    Due to the serious nature of total joint infection and the high prevalence of community acquired MRSA, vancomycin will be used perioperatively.

## 2022-11-08 NOTE — H&P (VIEW-ONLY)
CC:  Right knee pain    Coy Rosado Jr. is a 51 y.o. male with history of Right knee pain. Pain is worse with activity and weight bearing.  Patient has experienced interference of activities of daily living due to decreased range of motion and an increase in joint pain and swelling.  Patient has failed non-operative treatment including NSAIDs, corticosteroid injections, viscosupplement injections, and activity modification.  Coy Rosado Jr. currently ambulates independently.     Relevant medical conditions of significance in perioperative period:  HTN- on medication managed by pcp  CHERRY- on CPAP     Given documented medical comorbidities including those listed above and based off of CMS criteria patient would meet inpatient admission status guidelines. This case has been approved as inpatient.     Past Medical History:   Diagnosis Date    HTN (hypertension)     Low back pain        Past Surgical History:   Procedure Laterality Date    leg laceration  1984       Family History   Problem Relation Age of Onset    Bone cancer Mother     Diabetes type II Father        Review of patient's allergies indicates:  No Known Allergies      Current Outpatient Medications:     celecoxib (CELEBREX) 100 MG capsule, TAKE 1 CAPSULE BY MOUTH TWICE DAILY AS NEEDED FOR PAIN, Disp: 60 capsule, Rfl: 0    losartan (COZAAR) 50 MG tablet, Take 1 tablet by mouth once daily, Disp: 30 tablet, Rfl: 0    promethazine-dextromethorphan (PROMETHAZINE-DM) 6.25-15 mg/5 mL Syrp, Take 5 mLs by mouth every 6 (six) hours as needed (cough)., Disp: 118 mL, Rfl: 0    celecoxib (CELEBREX) 100 MG capsule, Take 1 capsule (100 mg total) by mouth once daily., Disp: 90 capsule, Rfl: 0    Current Facility-Administered Medications:     acetaminophen tablet 650 mg, 650 mg, Oral, Once PRN, Juwan Bennett MD    albuterol inhaler 2 puff, 2 puff, Inhalation, Q20 Min PRN, Juwan Bennett MD    diphenhydrAMINE injection 25 mg, 25 mg, Intravenous, Once PRN, Juwan LUCERO  "MD Sabrina    EPINEPHrine (EPIPEN) 0.3 mg/0.3 mL pen injection 0.3 mg, 0.3 mg, Intramuscular, PRN, Juwan Bennett MD    hyaluronate sodium, stabilized (MONOVISC) Syrg, , Intra-articular, 1 time in Clinic/HOD, Shane Wills III, MD    hylan g-f 20 (SYNVISC ONE) 48 mg/6 mL injection 48 mg, 48 mg, Intra-articular, 1 time in Clinic/HOD, Shane Wills III, MD    methylPREDNISolone sodium succinate injection 40 mg, 40 mg, Intravenous, Once PRN, Juwan Bennett MD    ondansetron disintegrating tablet 4 mg, 4 mg, Oral, Once PRN, Juwan Bennett MD    sodium chloride 0.9% 500 mL flush bag, , Intravenous, PRN, Juwan Bennett MD    sodium chloride 0.9% flush 10 mL, 10 mL, Intravenous, PRN, Juwan Bennett MD    Facility-Administered Medications Ordered in Other Visits:     triamcinolone acetonide injection 40 mg, 40 mg, Intra-articular, , Shane Wills III, MD, 40 mg at 04/07/22 1100    Review of Systems:  Constitutional: no fever or chills  Eyes: no visual changes  ENT: no nasal congestion or sore throat  Respiratory: no cough or shortness of breath  Cardiovascular: no chest pain or palpitations  Gastrointestinal: no nausea or vomiting, tolerating diet  Genitourinary: no hematuria or dysuria  Integument/Breast: no rash or pruritis  Hematologic/Lymphatic: no easy bruising or lymphadenopathy  Musculoskeletal: positive for knee pain  Neurological: no seizures or tremors  Behavioral/Psych: no auditory or visual hallucinations  Endocrine: no heat or cold intolerance    PE:  Ht 5' 8.5" (1.74 m)   Wt 116 kg (255 lb 11.2 oz)   BMI 38.31 kg/m²   General: Pleasant, cooperative, NAD   Gait: antalgic  HEENT: NCAT, sclera nonicteric   Lungs: Respirations clear bilaterally; equal and unlabored.   CV: S1S2; 2+ bilateral upper and lower extremity pulses.   Skin: Intact throughout with no rashes, erythema, or lesions  Extremities: No LE edema,  no erythema or warmth of the skin in either lower " extremity.    Right knee exam:  Knee Range of Motion:normal, pain with passive range of motion  Effusion:none  Condition of skin:intact  Location of tenderness:Medial joint line   Strength:normal  Stability: stable to testing    Hip Examination: painless PROM of hip     Radiographs: Radiographs reveal advanced degenerative changes including subchondral cyst formation, subchondral sclerosis, osteophyte formation, joint space narrowing.     Knee Alignment: normal    Diagnosis: Primary osteoarthritis Right knee    Plan: Right total knee arthroplasty    Due to the serious nature of total joint infection and the high prevalence of community acquired MRSA, vancomycin will be used perioperatively.

## 2022-11-25 ENCOUNTER — CLINICAL SUPPORT (OUTPATIENT)
Dept: REHABILITATION | Facility: HOSPITAL | Age: 52
End: 2022-11-25
Attending: ORTHOPAEDIC SURGERY
Payer: COMMERCIAL

## 2022-11-25 DIAGNOSIS — Z74.09 IMPAIRED FUNCTIONAL MOBILITY, BALANCE, GAIT, AND ENDURANCE: ICD-10-CM

## 2022-11-25 DIAGNOSIS — M25.60 DECREASED RANGE OF MOTION WITH DECREASED STRENGTH: ICD-10-CM

## 2022-11-25 DIAGNOSIS — G89.29 CHRONIC PATELLOFEMORAL PAIN OF RIGHT KNEE: Primary | ICD-10-CM

## 2022-11-25 DIAGNOSIS — M25.561 CHRONIC PATELLOFEMORAL PAIN OF RIGHT KNEE: Primary | ICD-10-CM

## 2022-11-25 DIAGNOSIS — R26.89 GAIT, ANTALGIC: ICD-10-CM

## 2022-11-25 DIAGNOSIS — M17.11 PRIMARY OSTEOARTHRITIS OF RIGHT KNEE: ICD-10-CM

## 2022-11-25 DIAGNOSIS — R53.1 DECREASED RANGE OF MOTION WITH DECREASED STRENGTH: ICD-10-CM

## 2022-11-25 PROCEDURE — 97162 PT EVAL MOD COMPLEX 30 MIN: CPT | Mod: PN

## 2022-11-25 PROCEDURE — 97110 THERAPEUTIC EXERCISES: CPT | Mod: PN

## 2022-11-25 NOTE — PROGRESS NOTES
[facility]  Physical Therapy Initial Evaluation - Knee    Name: Coy Rosado Jr.  Clinic Number: [id number]    Therapy Diagnosis:   Encounter Diagnoses   Name Primary?    Primary osteoarthritis of right knee     Chronic patellofemoral pain of right knee Yes    Impaired functional mobility, balance, gait, and endurance     Decreased range of motion with decreased strength      Physician: Shane Wills III, *    Physician Orders: PT Eval and Treat   Medical Diagnosis from Referral: M17.11 (ICD-10-CM) - Primary osteoarthritis of right knee  Evaluation Date: 11/25/2022  Plan of Care Expiration: 2/17/23 or 24th Visit  Authorization Period Expiration: 7/5/23  Visit # / Visits authorized: 1/ 1  Remaining Visits Scheduled - 00        Please see Plan of Care notation section to view Coy Rosado JrRenata Initial Evaluation.       Jesus Alberto Turner, PT,DPT    
Photo Preface (Leave Blank If You Do Not Want): Photographs were obtained today
Detail Level: Zone

## 2022-11-28 PROBLEM — R26.89 GAIT, ANTALGIC: Status: ACTIVE | Noted: 2022-11-28

## 2022-11-28 NOTE — PLAN OF CARE
[facility]  Physical Therapy Initial Evaluation - Knee    Name: Coy Rosado Jr.  Clinic Number: [id number]    Therapy Diagnosis:   Encounter Diagnoses   Name Primary?    Primary osteoarthritis of right knee     Chronic patellofemoral pain of right knee Yes    Impaired functional mobility, balance, gait, and endurance     Decreased range of motion with decreased strength      Physician: Sahne Wills III, *    Physician Orders: PT Eval and Treat   Medical Diagnosis from Referral: M17.11 (ICD-10-CM) - Primary osteoarthritis of right knee  Evaluation Date: 11/25/2022  Plan of Care Expiration: 2/17/23 or 24th Visit  Authorization Period Expiration: 7/5/23  Visit # / Visits authorized: 1/ 1  Remaining Visits Scheduled - 00    Eval Visit FOTO-  (Date/Score/Turn Green)   5th Visit FOTO   -  (Date/Score/Turn Green)   10th Visit FOTO  -  (Date/Score/Turn Green)   D/C FOTO          -  (Date/Score/Turn Green)     Time In: 1145  Time Out: 1230  Total Billable Time: 45 minutes    Precautions: Standard and Fall    Subjective     History of current condition - Coy is a 52 y.o. year old male who presents to the [facility] PT clinic  with complaint of right knee pain for last 3 years. Pt report onset of the symptoms occurred  3 years  prior to evaluation. Precipitating event:Insidious Onset . Current symptoms include: knee pain, swelling, difficulty with ambulation. Aggravating factors: weight bearing .   Treatment to date: Physical Therapy . Patients presently rates pain 4/10 on pain scale.    Mechanism of Injury: insidious onset   Next MD Visit: TBD      Medical History:   Past Medical History:   Diagnosis Date    Allergy     Diabetes mellitus, type 2     HTN (hypertension)     Low back pain        Surgical History:   Coy Rosado Jr.  has a past surgical history that includes leg laceration (1984) and Colonoscopy.    Medications:   Coy has a current medication list which includes the following  prescription(s): celecoxib and losartan, and the following Facility-Administered Medications: acetaminophen, albuterol, diphenhydramine, epinephrine, hyaluronate sodium, stabilized, hylan g-f 20, methylprednisolone sodium succinate, ondansetron, sodium chloride 0.9% 500 mL flush bag, sodium chloride 0.9%, and triamcinolone acetonide.    Allergies:   Review of patient's allergies indicates:  No Known Allergies     Imaging:See imaging tab    Prior Therapy: Land based therapy received for current condition   Social History: Coy lives in a single story home with 0 steps to enter and  lives with their spouse  Occupation: Maintenance  (Job related duties include walking, standing)  Assistive Devices Owned: none   Hobbies/Exercise: walking  Hand Dominance: right  Prior Level of Function: Independent  Current Level of Function: Modified Independent secondary to right knee pain     Pain:  Current 4/10, worst 9/10 (climbing steps/running/ increased weight bearing   increases pain), best 3/10 (rest reduces pain)  Location: right knee   Description: Aching and Throbbing  Aggravating Factors: Standing and climbing   Easing Factors: rest    Pts goals: exercise without pain     Objective     Posture: Fair  Palpation: mild generalized muscle tenderness  Sensation: intact to light touch    Range of Motion/Strength:     Knee Left Right Pain/Dysfunction with Movement   AROM      Knee Flexion (140)  115 °   120 °     Knee Extension (0)  0 °   0 °           RLE 5/5 4+/5 4/5 4-/5 3+/5 3/5 3-/5 2+/5 2/5 2-/5 1/5 0 NT   Hip Flexion    x                 Hip Extension    x                 Hip Abduction    x                 Hip Adduction    x                 Hip Internal Rotation    x                 Hip External Rotation    x                 Knee Flexion    x                 Knee Extension    x                 Ankle Dorsiflexion    x                 Ankle Plantarflexion    x                    LLE 5/5 4+/5 4/5 4-/5 3+/5 3/5 3-/5 2+/5 2/5  2-/5 1/5 0 NT   Hip Flexion    x                      Hip Extension    x                      Hip Abduction    x                      Hip Adduction    x                      Hip Internal Rotation    x                      Hip External Rotation    x                      Knee Flexion    x                      Knee Extension    x                      Ankle Dorsiflexion    x                      Ankle Plantarflexion    x                             Special Tests Left Right Comments   Single Leg Stance 8 sec 9 sec    Flexibility       90/90 Hamstrings  -35 ° -35 °    Girth Measurements       5 cm above MJL  37.5 cm  36.5 cm     At  MJL  41.5 cm  40 cm     5 cm below MJL  46 cm  45 cm     Joint   Mobility   (grading 0-6 out of 6)       Superior Patella glide 2 2    Inferior Patella glide 2 2    Lateral Patella glide 2 2    Medial Patella glide  2 2      Gait Analysis   Coy ambulated 120 feet with none device with independence assistance. Coy displays lack of stride length and knee extensino gait deviation during 1 gait cycle.      Other:   Sit to Stand - 10 Reps. Completed in 30 sec.  TUG -  <14 Sec. To complete 10ft. (> 14sec. Considered a fall risk)           CMS Impairment/Limitation/Restriction for FOTO Knee Survey    Therapist reviewed FOTO scores for Coy Rosado Jr. on 11/25/2022.   FOTO documents entered into [email or web address] - see Media section.    Limitation Score: See scanned media  Category: Mobility           TREATMENT   Treatment Time In: 1220  Treatment Time Out: 1230  Total Treatment time separate from Evaluation: 10 minutes    Coy received therapeutic exercises to develop strength, ROM, and flexibility for 10 minutes including:  Warm-up      Supine    Heel slides Right  LE with strap - 30 reps   Quad Sets with towel Right  LE - 30 reps with 5 sec. Hold   SLR Right LE  - 30 reps   Heel slides with and without strap - 30 reps   Hip Abduction with blue TB and Adduction - 30 reps   Prone HS curl with Right  LE - 30  reps   Patella Mob - 1' in each direction    Seated        Standing            Home Exercises and Patient Education Provided    Education provided:   Patient was provided educational information regarding: role of Physical Therapy, short and long term goals, patient/therapist expectations, scheduling, and attendance policy.    Written Home Exercises Provided: yes  Exercises were reviewed and Coy was able to demonstrate them prior to the end of the session.  Coy demonstrated fair  understanding of the education provided.     See EMR under: Patient Instructions for exercises provided 11/25/2022.    Assessment     Coy is a 52 y.o. male referred to outpatient Physical Therapy with a medical diagnosis of Primary osteoarthritis of right knee. Pt presents with displays of weakness, impaired endurance, impaired functional mobility, gait instability, decreased lower extremity function, pain, and decreased ROM. Patient currently presents to therapy with displays of limited range of motion and strength with expected limitations with OA diagnosis. Patient is scheduled to receive TKA and will benefit from skilled therapy to address current deficits.     Pt prognosis is Fair.   Pt will benefit from skilled outpatient Physical Therapy to address the deficits stated above and in the chart below, provide pt/family education, and to maximize pt's level of independence.     Plan of care discussed with patient: Yes  Pt's spiritual, cultural and educational needs considered and patient is agreeable to the plan of care and goals as stated below:     Anticipated Barriers for therapy: chronic pain     Medical Necessity is demonstrated by the following  History  Co-morbidities and personal factors that may impact the plan of care Co-morbidities:       Past Medical History:   Diagnosis Date    Allergy     Diabetes mellitus, type 2     HTN (hypertension)     Low back pain        Personal Factors:   no deficits     moderate    Examination  Body Structures and Functions, activity limitations and participation restrictions that may impact the plan of care Body Regions:   lower extremities    Body Systems:    ROM  strength  balance  gait    Participation Restrictions:   none    Activity limitations:   Learning and applying knowledge  no deficits    General Tasks and Commands  no deficits    Communication  no deficits    Mobility  walking    Self care  no deficits    Domestic Life  doing house work (cleaning house, washing dishes, laundry)    Interactions/Relationships  no deficits    Life Areas  no deficits    Community and Social Life  no deficits         moderate   Clinical Presentation stable and uncomplicated moderate   Decision Making/ Complexity Score: moderate     Goals:    Short Term Goals: 4 weeks  Goal   Status    Pt. to report decreased right knee pain  </= 4/10 at worst to increase tolerance for prolong standing     Pt. to demonstrate proper gait mechanics requiring minimum verbal cues from PT    Pt. to demonstrate an increase right knee AROM to >95 degrees to improve ease with stair negotiation.    Pt to be Independent with HEP to improve ROM and independence with ADL's        Long Term Goals: 8 weeks  Goal Status    Pt. to report decreased right knee pain  </= 1/10 at worst to increase tolerance for prolong standing     Pt. to demonstrate proper gait mechanics requiring no verbal cues from PT    Pt. to demonstrate an increase right knee AROM to >115 degrees to improve ease with stair negotiation.    Pt. to demonstrate increased MMT for right hip abductors to 4/5  to increase stability with ambulation on even surfaces.    Pt. to demonstrate increased MMT for right quadriceps to 4/5 to increase ability to negotiate stairs    Pt. to demonstrate increased MMT for right hamstring  to 4/5 to increase tolerance to squatting    Pt to be Independent with HEP to improve ROM and independence with ADL's          Plan   Plan of care  Certification: 11/25/2022 to  2/17/2023 (12).    Outpatient Physical Therapy 3 times weekly for 12 weeks to include the following interventions: Gait Training, Manual Therapy, Neuromuscular Re-ed, Patient Education, Therapeutic Activities, and Therapeutic Exercise.     Jesus Alberto Turner, PT,DPT

## 2022-11-29 ENCOUNTER — TELEPHONE (OUTPATIENT)
Dept: ORTHOPEDICS | Facility: CLINIC | Age: 52
End: 2022-11-29
Payer: COMMERCIAL

## 2022-11-29 NOTE — TELEPHONE ENCOUNTER
I called and left the patient a voicemail stating that we will reach out to him closer to his surgery day with his arrival time. I also left my name and a call back number.     ----- Message from Ward Curtis sent at 11/28/2022  1:50 PM CST -----  Good afternoon,     Can you call and let him know that I will touch base with him later this week?    Sincerely,   Nehemiah Curtis MS, OTC  OR & Clinical Assistant to Dr. Shane Wills III  Phone: [phone]  Fax: [phone]    ----- Message -----  From: Samia Alan  Sent: 11/28/2022  10:06 AM CST  To: Johann GRIMES Staff    Pt calling in regards to time of arrival for procedure on 12-5 , please leave mess , pt will be on cruise       Confirmed patient's contact info below:  Contact Name: Coy Rosado  Phone Number: [phone]

## 2022-12-01 ENCOUNTER — ANESTHESIA EVENT (OUTPATIENT)
Dept: SURGERY | Facility: HOSPITAL | Age: 52
End: 2022-12-01
Payer: COMMERCIAL

## 2022-12-01 RX ORDER — DOCUSATE SODIUM 100 MG/1
100 CAPSULE, LIQUID FILLED ORAL 2 TIMES DAILY PRN
Qty: 60 CAPSULE | Refills: 0 | Status: SHIPPED | OUTPATIENT
Start: 2022-12-01 | End: 2023-04-17 | Stop reason: ALTCHOICE

## 2022-12-01 RX ORDER — CELECOXIB 200 MG/1
200 CAPSULE ORAL DAILY
Qty: 30 CAPSULE | Refills: 0 | Status: SHIPPED | OUTPATIENT
Start: 2022-12-01 | End: 2023-01-05

## 2022-12-01 RX ORDER — ASPIRIN 81 MG/1
81 TABLET ORAL 2 TIMES DAILY
Qty: 60 TABLET | Refills: 0 | Status: SHIPPED | OUTPATIENT
Start: 2022-12-01 | End: 2023-04-17 | Stop reason: ALTCHOICE

## 2022-12-01 RX ORDER — METHOCARBAMOL 750 MG/1
750 TABLET, FILM COATED ORAL 3 TIMES DAILY PRN
Qty: 30 TABLET | Refills: 0 | Status: SHIPPED | OUTPATIENT
Start: 2022-12-01 | End: 2022-12-16 | Stop reason: SDUPTHER

## 2022-12-01 RX ORDER — DEXTROMETHORPHAN HYDROBROMIDE, GUAIFENESIN 5; 100 MG/5ML; MG/5ML
650 LIQUID ORAL
Qty: 120 TABLET | Refills: 0 | Status: SHIPPED | OUTPATIENT
Start: 2022-12-01 | End: 2023-04-17 | Stop reason: ALTCHOICE

## 2022-12-01 RX ORDER — OXYCODONE HYDROCHLORIDE 5 MG/1
TABLET ORAL
Qty: 50 TABLET | Refills: 0 | Status: SHIPPED | OUTPATIENT
Start: 2022-12-01 | End: 2022-12-20 | Stop reason: SDUPTHER

## 2022-12-02 ENCOUNTER — TELEPHONE (OUTPATIENT)
Dept: ORTHOPEDICS | Facility: CLINIC | Age: 52
End: 2022-12-02
Payer: COMMERCIAL

## 2022-12-02 ENCOUNTER — PATIENT MESSAGE (OUTPATIENT)
Dept: ORTHOPEDICS | Facility: CLINIC | Age: 52
End: 2022-12-02
Payer: COMMERCIAL

## 2022-12-02 RX ORDER — CEFADROXIL 500 MG/1
500 CAPSULE ORAL EVERY 12 HOURS
Qty: 14 CAPSULE | Refills: 0 | Status: SHIPPED | OUTPATIENT
Start: 2022-12-02 | End: 2022-12-13

## 2022-12-02 NOTE — TELEPHONE ENCOUNTER
Waiting on authorization  I certify that this is medically urgent and we need to proceed  Please change to outpatient instead of inpatient

## 2022-12-04 RX ORDER — POLYETHYLENE GLYCOL 3350 17 G/17G
17 POWDER, FOR SOLUTION ORAL DAILY
Status: CANCELLED | OUTPATIENT
Start: 2022-12-04

## 2022-12-04 RX ORDER — FENTANYL CITRATE 50 UG/ML
100 INJECTION, SOLUTION INTRAMUSCULAR; INTRAVENOUS
Status: CANCELLED | OUTPATIENT
Start: 2022-12-04 | End: 2022-12-05

## 2022-12-04 RX ORDER — AMOXICILLIN 250 MG
1 CAPSULE ORAL 2 TIMES DAILY
Status: CANCELLED | OUTPATIENT
Start: 2022-12-04

## 2022-12-04 RX ORDER — ONDANSETRON 2 MG/ML
4 INJECTION INTRAMUSCULAR; INTRAVENOUS EVERY 8 HOURS PRN
Status: CANCELLED | OUTPATIENT
Start: 2022-12-04

## 2022-12-04 RX ORDER — MUPIROCIN 20 MG/G
1 OINTMENT TOPICAL 2 TIMES DAILY
Status: CANCELLED | OUTPATIENT
Start: 2022-12-04 | End: 2022-12-09

## 2022-12-04 RX ORDER — FENTANYL CITRATE 50 UG/ML
25 INJECTION, SOLUTION INTRAMUSCULAR; INTRAVENOUS EVERY 5 MIN PRN
Status: CANCELLED | OUTPATIENT
Start: 2022-12-04

## 2022-12-04 RX ORDER — BISACODYL 10 MG
10 SUPPOSITORY, RECTAL RECTAL EVERY 12 HOURS PRN
Status: CANCELLED | OUTPATIENT
Start: 2022-12-04

## 2022-12-04 RX ORDER — ACETAMINOPHEN 500 MG
1000 TABLET ORAL
Status: CANCELLED | OUTPATIENT
Start: 2022-12-04

## 2022-12-04 RX ORDER — MIDAZOLAM HYDROCHLORIDE 1 MG/ML
4 INJECTION INTRAMUSCULAR; INTRAVENOUS
Status: CANCELLED | OUTPATIENT
Start: 2022-12-04 | End: 2022-12-05

## 2022-12-04 RX ORDER — PROCHLORPERAZINE EDISYLATE 5 MG/ML
5 INJECTION INTRAMUSCULAR; INTRAVENOUS EVERY 6 HOURS PRN
Status: CANCELLED | OUTPATIENT
Start: 2022-12-04

## 2022-12-04 RX ORDER — MUPIROCIN 20 MG/G
1 OINTMENT TOPICAL
Status: CANCELLED | OUTPATIENT
Start: 2022-12-04

## 2022-12-04 RX ORDER — ASPIRIN 81 MG/1
81 TABLET ORAL 2 TIMES DAILY
Status: CANCELLED | OUTPATIENT
Start: 2022-12-04

## 2022-12-04 RX ORDER — SODIUM CHLORIDE 9 MG/ML
INJECTION, SOLUTION INTRAVENOUS CONTINUOUS
Status: CANCELLED | OUTPATIENT
Start: 2022-12-04 | End: 2022-12-05

## 2022-12-04 RX ORDER — ACETAMINOPHEN 500 MG
1000 TABLET ORAL EVERY 6 HOURS
Status: CANCELLED | OUTPATIENT
Start: 2022-12-04

## 2022-12-04 RX ORDER — MORPHINE SULFATE 2 MG/ML
2 INJECTION, SOLUTION INTRAMUSCULAR; INTRAVENOUS
Status: CANCELLED | OUTPATIENT
Start: 2022-12-04

## 2022-12-04 RX ORDER — PREGABALIN 75 MG/1
75 CAPSULE ORAL
Status: CANCELLED | OUTPATIENT
Start: 2022-12-04

## 2022-12-04 RX ORDER — FAMOTIDINE 20 MG/1
20 TABLET, FILM COATED ORAL 2 TIMES DAILY
Status: CANCELLED | OUTPATIENT
Start: 2022-12-04

## 2022-12-04 RX ORDER — METHOCARBAMOL 750 MG/1
750 TABLET, FILM COATED ORAL 3 TIMES DAILY
Status: CANCELLED | OUTPATIENT
Start: 2022-12-04

## 2022-12-04 RX ORDER — SODIUM CHLORIDE 9 MG/ML
INJECTION, SOLUTION INTRAVENOUS
Status: CANCELLED | OUTPATIENT
Start: 2022-12-04

## 2022-12-04 RX ORDER — LIDOCAINE HYDROCHLORIDE 10 MG/ML
1 INJECTION, SOLUTION EPIDURAL; INFILTRATION; INTRACAUDAL; PERINEURAL
Status: CANCELLED | OUTPATIENT
Start: 2022-12-04

## 2022-12-04 RX ORDER — PREGABALIN 75 MG/1
75 CAPSULE ORAL NIGHTLY
Status: CANCELLED | OUTPATIENT
Start: 2022-12-04

## 2022-12-04 RX ORDER — CELECOXIB 200 MG/1
200 CAPSULE ORAL DAILY
Status: CANCELLED | OUTPATIENT
Start: 2022-12-04

## 2022-12-04 RX ORDER — SODIUM CHLORIDE 0.9 % (FLUSH) 0.9 %
10 SYRINGE (ML) INJECTION
Status: CANCELLED | OUTPATIENT
Start: 2022-12-04

## 2022-12-04 RX ORDER — OXYCODONE HYDROCHLORIDE 5 MG/1
5 TABLET ORAL
Status: CANCELLED | OUTPATIENT
Start: 2022-12-04

## 2022-12-04 RX ORDER — TALC
6 POWDER (GRAM) TOPICAL NIGHTLY PRN
Status: CANCELLED | OUTPATIENT
Start: 2022-12-04

## 2022-12-04 RX ORDER — OXYCODONE HYDROCHLORIDE 5 MG/1
10 TABLET ORAL
Status: CANCELLED | OUTPATIENT
Start: 2022-12-04

## 2022-12-04 RX ORDER — CELECOXIB 200 MG/1
400 CAPSULE ORAL ONCE
Status: CANCELLED | OUTPATIENT
Start: 2022-12-04 | End: 2022-12-04

## 2022-12-04 RX ORDER — NALOXONE HCL 0.4 MG/ML
0.02 VIAL (ML) INJECTION
Status: CANCELLED | OUTPATIENT
Start: 2022-12-04

## 2022-12-05 ENCOUNTER — ANESTHESIA (OUTPATIENT)
Dept: SURGERY | Facility: HOSPITAL | Age: 52
End: 2022-12-05
Payer: COMMERCIAL

## 2022-12-05 ENCOUNTER — HOSPITAL ENCOUNTER (OUTPATIENT)
Facility: HOSPITAL | Age: 52
LOS: 1 days | Discharge: HOME | End: 2022-12-06
Attending: ORTHOPAEDIC SURGERY | Admitting: ORTHOPAEDIC SURGERY
Payer: COMMERCIAL

## 2022-12-05 DIAGNOSIS — U07.1 COVID-19: ICD-10-CM

## 2022-12-05 DIAGNOSIS — M17.11 PRIMARY OSTEOARTHRITIS OF RIGHT KNEE: ICD-10-CM

## 2022-12-05 LAB
POCT GLUCOSE: 115 MG/DL (ref 70–110)
POCT GLUCOSE: 147 MG/DL (ref 70–110)

## 2022-12-05 PROCEDURE — 27447 PR TOTAL KNEE ARTHROPLASTY: ICD-10-PCS | Mod: 22,RT,, | Performed by: ORTHOPAEDIC SURGERY

## 2022-12-05 PROCEDURE — 97535 SELF CARE MNGMENT TRAINING: CPT

## 2022-12-05 PROCEDURE — 37000008 HC ANESTHESIA 1ST 15 MINUTES: Performed by: ORTHOPAEDIC SURGERY

## 2022-12-05 PROCEDURE — 71000039 HC RECOVERY, EACH ADD'L HOUR: Performed by: ORTHOPAEDIC SURGERY

## 2022-12-05 PROCEDURE — 63600175 PHARM REV CODE 636 W HCPCS: Performed by: NURSE ANESTHETIST, CERTIFIED REGISTERED

## 2022-12-05 PROCEDURE — 27000190 HC CPAP FULL FACE MASK W/VALVE

## 2022-12-05 PROCEDURE — 63600175 PHARM REV CODE 636 W HCPCS: Performed by: ANESTHESIOLOGY

## 2022-12-05 PROCEDURE — D9220A PRA ANESTHESIA: Mod: ANES,,, | Performed by: ANESTHESIOLOGY

## 2022-12-05 PROCEDURE — 36000710: Performed by: ORTHOPAEDIC SURGERY

## 2022-12-05 PROCEDURE — 63600175 PHARM REV CODE 636 W HCPCS: Performed by: ORTHOPAEDIC SURGERY

## 2022-12-05 PROCEDURE — 25000003 PHARM REV CODE 250: Performed by: ORTHOPAEDIC SURGERY

## 2022-12-05 PROCEDURE — D9220A PRA ANESTHESIA: Mod: CRNA,,, | Performed by: NURSE ANESTHETIST, CERTIFIED REGISTERED

## 2022-12-05 PROCEDURE — 27447 TOTAL KNEE ARTHROPLASTY: CPT | Mod: 22,RT,, | Performed by: ORTHOPAEDIC SURGERY

## 2022-12-05 PROCEDURE — 99900035 HC TECH TIME PER 15 MIN (STAT)

## 2022-12-05 PROCEDURE — 63600175 PHARM REV CODE 636 W HCPCS: Performed by: NURSE PRACTITIONER

## 2022-12-05 PROCEDURE — 25000003 PHARM REV CODE 250: Performed by: NURSE PRACTITIONER

## 2022-12-05 PROCEDURE — D9220A PRA ANESTHESIA: ICD-10-PCS | Mod: ANES,,, | Performed by: ANESTHESIOLOGY

## 2022-12-05 PROCEDURE — 37000009 HC ANESTHESIA EA ADD 15 MINS: Performed by: ORTHOPAEDIC SURGERY

## 2022-12-05 PROCEDURE — 25000003 PHARM REV CODE 250: Performed by: NURSE ANESTHETIST, CERTIFIED REGISTERED

## 2022-12-05 PROCEDURE — 71000033 HC RECOVERY, INTIAL HOUR: Performed by: ORTHOPAEDIC SURGERY

## 2022-12-05 PROCEDURE — C1776 JOINT DEVICE (IMPLANTABLE): HCPCS | Performed by: ORTHOPAEDIC SURGERY

## 2022-12-05 PROCEDURE — 94761 N-INVAS EAR/PLS OXIMETRY MLT: CPT

## 2022-12-05 PROCEDURE — 97165 OT EVAL LOW COMPLEX 30 MIN: CPT

## 2022-12-05 PROCEDURE — 20985 PR CPTR-ASST SURGICAL NAVIGATION IMAGE-LESS: ICD-10-PCS | Mod: ,,, | Performed by: ORTHOPAEDIC SURGERY

## 2022-12-05 PROCEDURE — D9220A PRA ANESTHESIA: ICD-10-PCS | Mod: CRNA,,, | Performed by: NURSE ANESTHETIST, CERTIFIED REGISTERED

## 2022-12-05 PROCEDURE — 20985 CPTR-ASST DIR MS PX: CPT | Mod: ,,, | Performed by: ORTHOPAEDIC SURGERY

## 2022-12-05 PROCEDURE — 36000711: Performed by: ORTHOPAEDIC SURGERY

## 2022-12-05 PROCEDURE — 27201423 OPTIME MED/SURG SUP & DEVICES STERILE SUPPLY: Performed by: ORTHOPAEDIC SURGERY

## 2022-12-05 PROCEDURE — 25000003 PHARM REV CODE 250: Performed by: STUDENT IN AN ORGANIZED HEALTH CARE EDUCATION/TRAINING PROGRAM

## 2022-12-05 PROCEDURE — 97116 GAIT TRAINING THERAPY: CPT

## 2022-12-05 PROCEDURE — 94660 CPAP INITIATION&MGMT: CPT

## 2022-12-05 PROCEDURE — 63600175 PHARM REV CODE 636 W HCPCS: Performed by: STUDENT IN AN ORGANIZED HEALTH CARE EDUCATION/TRAINING PROGRAM

## 2022-12-05 PROCEDURE — C1713 ANCHOR/SCREW BN/BN,TIS/BN: HCPCS | Performed by: ORTHOPAEDIC SURGERY

## 2022-12-05 PROCEDURE — 97162 PT EVAL MOD COMPLEX 30 MIN: CPT

## 2022-12-05 DEVICE — BASEPLATE TIB TOTAL STAB SZ 5: Type: IMPLANTABLE DEVICE | Site: KNEE | Status: FUNCTIONAL

## 2022-12-05 DEVICE — INSERT TIBIAL SZ 5 9MMX3: Type: IMPLANTABLE DEVICE | Site: KNEE | Status: FUNCTIONAL

## 2022-12-05 DEVICE — PATELLA TRI 36X10 X3 POLYETHYL: Type: IMPLANTABLE DEVICE | Site: KNEE | Status: FUNCTIONAL

## 2022-12-05 DEVICE — COMP FEM CRUCIATE CEM DZ 5 RT: Type: IMPLANTABLE DEVICE | Site: KNEE | Status: FUNCTIONAL

## 2022-12-05 DEVICE — CEMENT BONE SURG SMPLX P RADPQ: Type: IMPLANTABLE DEVICE | Site: KNEE | Status: FUNCTIONAL

## 2022-12-05 RX ORDER — CELECOXIB 100 MG/1
100 CAPSULE ORAL
COMMUNITY
End: 2023-04-17 | Stop reason: ALTCHOICE

## 2022-12-05 RX ORDER — SODIUM CHLORIDE 9 MG/ML
INJECTION, SOLUTION INTRAVENOUS CONTINUOUS
Status: DISCONTINUED | OUTPATIENT
Start: 2022-12-05 | End: 2022-12-06 | Stop reason: HOSPADM

## 2022-12-05 RX ORDER — CEFAZOLIN SODIUM 2 G/50ML
2 SOLUTION INTRAVENOUS
Status: COMPLETED | OUTPATIENT
Start: 2022-12-05 | End: 2022-12-06

## 2022-12-05 RX ORDER — ACETAMINOPHEN 500 MG
1000 TABLET ORAL
Status: COMPLETED | OUTPATIENT
Start: 2022-12-05 | End: 2022-12-05

## 2022-12-05 RX ORDER — LIDOCAINE HYDROCHLORIDE 10 MG/ML
1 INJECTION, SOLUTION EPIDURAL; INFILTRATION; INTRACAUDAL; PERINEURAL
Status: DISCONTINUED | OUTPATIENT
Start: 2022-12-05 | End: 2022-12-05 | Stop reason: HOSPADM

## 2022-12-05 RX ORDER — PROPOFOL 10 MG/ML
VIAL (ML) INTRAVENOUS
Status: DISCONTINUED | OUTPATIENT
Start: 2022-12-05 | End: 2022-12-16

## 2022-12-05 RX ORDER — LOSARTAN POTASSIUM 25 MG/1
50 TABLET ORAL DAILY
Status: DISCONTINUED | OUTPATIENT
Start: 2022-12-06 | End: 2022-12-06 | Stop reason: HOSPADM

## 2022-12-05 RX ORDER — CEFAZOLIN SODIUM 2 G/50ML
2 SOLUTION INTRAVENOUS
Status: COMPLETED | OUTPATIENT
Start: 2022-12-05 | End: 2022-12-05

## 2022-12-05 RX ORDER — ONDANSETRON 2 MG/ML
INJECTION INTRAMUSCULAR; INTRAVENOUS
Status: DISCONTINUED | OUTPATIENT
Start: 2022-12-05 | End: 2022-12-16

## 2022-12-05 RX ORDER — PREGABALIN 75 MG/1
75 CAPSULE ORAL
Status: COMPLETED | OUTPATIENT
Start: 2022-12-05 | End: 2022-12-05

## 2022-12-05 RX ORDER — TALC
6 POWDER (GRAM) TOPICAL NIGHTLY PRN
Status: DISCONTINUED | OUTPATIENT
Start: 2022-12-05 | End: 2022-12-05 | Stop reason: HOSPADM

## 2022-12-05 RX ORDER — TRANEXAMIC ACID 100 MG/ML
1000 INJECTION, SOLUTION INTRAVENOUS
Status: COMPLETED | OUTPATIENT
Start: 2022-12-05 | End: 2022-12-05

## 2022-12-05 RX ORDER — FENTANYL CITRATE 50 UG/ML
100 INJECTION, SOLUTION INTRAMUSCULAR; INTRAVENOUS
Status: DISCONTINUED | OUTPATIENT
Start: 2022-12-05 | End: 2022-12-05 | Stop reason: HOSPADM

## 2022-12-05 RX ORDER — ALBUTEROL SULFATE 90 UG/1
2 AEROSOL, METERED RESPIRATORY (INHALATION)
Status: DISCONTINUED | OUTPATIENT
Start: 2022-12-05 | End: 2022-12-06 | Stop reason: HOSPADM

## 2022-12-05 RX ORDER — ACETAMINOPHEN 10 MG/ML
1000 INJECTION, SOLUTION INTRAVENOUS ONCE
Status: COMPLETED | OUTPATIENT
Start: 2022-12-05 | End: 2022-12-05

## 2022-12-05 RX ORDER — ONDANSETRON 2 MG/ML
4 INJECTION INTRAMUSCULAR; INTRAVENOUS EVERY 8 HOURS PRN
Status: DISCONTINUED | OUTPATIENT
Start: 2022-12-05 | End: 2022-12-06 | Stop reason: HOSPADM

## 2022-12-05 RX ORDER — MIDAZOLAM HYDROCHLORIDE 1 MG/ML
INJECTION, SOLUTION INTRAMUSCULAR; INTRAVENOUS
Status: DISCONTINUED | OUTPATIENT
Start: 2022-12-05 | End: 2022-12-16

## 2022-12-05 RX ORDER — AMOXICILLIN 250 MG
1 CAPSULE ORAL 2 TIMES DAILY
Status: DISCONTINUED | OUTPATIENT
Start: 2022-12-05 | End: 2022-12-06 | Stop reason: HOSPADM

## 2022-12-05 RX ORDER — ACETAMINOPHEN 500 MG
1000 TABLET ORAL EVERY 6 HOURS
Status: DISCONTINUED | OUTPATIENT
Start: 2022-12-05 | End: 2022-12-05

## 2022-12-05 RX ORDER — MUPIROCIN 20 MG/G
1 OINTMENT TOPICAL 2 TIMES DAILY
Status: DISCONTINUED | OUTPATIENT
Start: 2022-12-05 | End: 2022-12-06 | Stop reason: HOSPADM

## 2022-12-05 RX ORDER — DIPHENHYDRAMINE HYDROCHLORIDE 50 MG/ML
25 INJECTION INTRAMUSCULAR; INTRAVENOUS ONCE AS NEEDED
Status: DISCONTINUED | OUTPATIENT
Start: 2022-12-05 | End: 2022-12-06 | Stop reason: HOSPADM

## 2022-12-05 RX ORDER — EPINEPHRINE 1 MG/ML
0.3 INJECTION, SOLUTION, CONCENTRATE INTRAVENOUS
Status: DISCONTINUED | OUTPATIENT
Start: 2022-12-05 | End: 2022-12-06 | Stop reason: HOSPADM

## 2022-12-05 RX ORDER — LIDOCAINE HYDROCHLORIDE 20 MG/ML
INJECTION INTRAVENOUS
Status: DISCONTINUED | OUTPATIENT
Start: 2022-12-05 | End: 2022-12-16

## 2022-12-05 RX ORDER — CELECOXIB 200 MG/1
400 CAPSULE ORAL ONCE
Status: COMPLETED | OUTPATIENT
Start: 2022-12-05 | End: 2022-12-05

## 2022-12-05 RX ORDER — ACETAMINOPHEN 500 MG
1000 TABLET ORAL EVERY 8 HOURS PRN
Status: DISCONTINUED | OUTPATIENT
Start: 2022-12-05 | End: 2022-12-06 | Stop reason: HOSPADM

## 2022-12-05 RX ORDER — POLYETHYLENE GLYCOL 3350 17 G/17G
17 POWDER, FOR SOLUTION ORAL DAILY
Status: DISCONTINUED | OUTPATIENT
Start: 2022-12-06 | End: 2022-12-06 | Stop reason: HOSPADM

## 2022-12-05 RX ORDER — SODIUM CHLORIDE 0.9 % (FLUSH) 0.9 %
10 SYRINGE (ML) INJECTION
Status: DISCONTINUED | OUTPATIENT
Start: 2022-12-05 | End: 2022-12-05 | Stop reason: HOSPADM

## 2022-12-05 RX ORDER — MORPHINE SULFATE 2 MG/ML
2 INJECTION, SOLUTION INTRAMUSCULAR; INTRAVENOUS
Status: DISCONTINUED | OUTPATIENT
Start: 2022-12-05 | End: 2022-12-06 | Stop reason: HOSPADM

## 2022-12-05 RX ORDER — OXYCODONE HYDROCHLORIDE 10 MG/1
10 TABLET ORAL
Status: DISCONTINUED | OUTPATIENT
Start: 2022-12-05 | End: 2022-12-06 | Stop reason: HOSPADM

## 2022-12-05 RX ORDER — BISACODYL 10 MG
10 SUPPOSITORY, RECTAL RECTAL EVERY 12 HOURS PRN
Status: DISCONTINUED | OUTPATIENT
Start: 2022-12-05 | End: 2022-12-06 | Stop reason: HOSPADM

## 2022-12-05 RX ORDER — FENTANYL CITRATE 50 UG/ML
INJECTION, SOLUTION INTRAMUSCULAR; INTRAVENOUS
Status: DISCONTINUED | OUTPATIENT
Start: 2022-12-05 | End: 2022-12-16

## 2022-12-05 RX ORDER — PROCHLORPERAZINE EDISYLATE 5 MG/ML
5 INJECTION INTRAMUSCULAR; INTRAVENOUS EVERY 6 HOURS PRN
Status: DISCONTINUED | OUTPATIENT
Start: 2022-12-05 | End: 2022-12-06 | Stop reason: HOSPADM

## 2022-12-05 RX ORDER — DEXAMETHASONE SODIUM PHOSPHATE 4 MG/ML
INJECTION, SOLUTION INTRA-ARTICULAR; INTRALESIONAL; INTRAMUSCULAR; INTRAVENOUS; SOFT TISSUE
Status: DISCONTINUED | OUTPATIENT
Start: 2022-12-05 | End: 2022-12-16

## 2022-12-05 RX ORDER — MUPIROCIN 20 MG/G
1 OINTMENT TOPICAL
Status: COMPLETED | OUTPATIENT
Start: 2022-12-05 | End: 2022-12-05

## 2022-12-05 RX ORDER — PROPOFOL 10 MG/ML
VIAL (ML) INTRAVENOUS CONTINUOUS PRN
Status: DISCONTINUED | OUTPATIENT
Start: 2022-12-05 | End: 2022-12-16

## 2022-12-05 RX ORDER — LIDOCAINE HYDROCHLORIDE AND EPINEPHRINE 15; 5 MG/ML; UG/ML
INJECTION, SOLUTION EPIDURAL
Status: DISCONTINUED | OUTPATIENT
Start: 2022-12-05 | End: 2022-12-16

## 2022-12-05 RX ORDER — VANCOMYCIN HYDROCHLORIDE 1 G/20ML
INJECTION, POWDER, LYOPHILIZED, FOR SOLUTION INTRAVENOUS
Status: DISCONTINUED | OUTPATIENT
Start: 2022-12-05 | End: 2022-12-05 | Stop reason: HOSPADM

## 2022-12-05 RX ORDER — PREGABALIN 75 MG/1
75 CAPSULE ORAL NIGHTLY
Status: DISCONTINUED | OUTPATIENT
Start: 2022-12-05 | End: 2022-12-06 | Stop reason: HOSPADM

## 2022-12-05 RX ORDER — NICARDIPINE HYDROCHLORIDE 2.5 MG/ML
INJECTION INTRAVENOUS
Status: DISCONTINUED | OUTPATIENT
Start: 2022-12-05 | End: 2022-12-16

## 2022-12-05 RX ORDER — MIDAZOLAM HYDROCHLORIDE 1 MG/ML
4 INJECTION INTRAMUSCULAR; INTRAVENOUS
Status: DISCONTINUED | OUTPATIENT
Start: 2022-12-05 | End: 2022-12-05 | Stop reason: HOSPADM

## 2022-12-05 RX ORDER — KETAMINE HYDROCHLORIDE 100 MG/ML
INJECTION, SOLUTION INTRAMUSCULAR; INTRAVENOUS
Status: DISCONTINUED | OUTPATIENT
Start: 2022-12-05 | End: 2022-12-16

## 2022-12-05 RX ORDER — SODIUM CHLORIDE 9 MG/ML
INJECTION, SOLUTION INTRAVENOUS
Status: COMPLETED | OUTPATIENT
Start: 2022-12-05 | End: 2022-12-05

## 2022-12-05 RX ORDER — METHOCARBAMOL 750 MG/1
750 TABLET, FILM COATED ORAL 3 TIMES DAILY
Status: DISCONTINUED | OUTPATIENT
Start: 2022-12-05 | End: 2022-12-06 | Stop reason: HOSPADM

## 2022-12-05 RX ORDER — TRANEXAMIC ACID 100 MG/ML
INJECTION, SOLUTION INTRAVENOUS
Status: DISCONTINUED | OUTPATIENT
Start: 2022-12-05 | End: 2022-12-05 | Stop reason: HOSPADM

## 2022-12-05 RX ORDER — OXYCODONE HYDROCHLORIDE 5 MG/1
5 TABLET ORAL
Status: DISCONTINUED | OUTPATIENT
Start: 2022-12-05 | End: 2022-12-06 | Stop reason: HOSPADM

## 2022-12-05 RX ORDER — METHOCARBAMOL 750 MG/1
1500 TABLET, FILM COATED ORAL ONCE
Status: DISCONTINUED | OUTPATIENT
Start: 2022-12-05 | End: 2022-12-06 | Stop reason: HOSPADM

## 2022-12-05 RX ORDER — FENTANYL CITRATE 50 UG/ML
25 INJECTION, SOLUTION INTRAMUSCULAR; INTRAVENOUS EVERY 5 MIN PRN
Status: COMPLETED | OUTPATIENT
Start: 2022-12-05 | End: 2022-12-05

## 2022-12-05 RX ORDER — TRANEXAMIC ACID 100 MG/ML
1000 INJECTION, SOLUTION INTRAVENOUS
Status: DISCONTINUED | OUTPATIENT
Start: 2022-12-05 | End: 2022-12-05 | Stop reason: HOSPADM

## 2022-12-05 RX ORDER — FAMOTIDINE 20 MG/1
20 TABLET, FILM COATED ORAL 2 TIMES DAILY
Status: DISCONTINUED | OUTPATIENT
Start: 2022-12-05 | End: 2022-12-06 | Stop reason: HOSPADM

## 2022-12-05 RX ORDER — NALOXONE HCL 0.4 MG/ML
0.02 VIAL (ML) INJECTION
Status: DISCONTINUED | OUTPATIENT
Start: 2022-12-05 | End: 2022-12-06 | Stop reason: HOSPADM

## 2022-12-05 RX ORDER — ASPIRIN 81 MG/1
81 TABLET ORAL 2 TIMES DAILY
Status: DISCONTINUED | OUTPATIENT
Start: 2022-12-05 | End: 2022-12-06 | Stop reason: HOSPADM

## 2022-12-05 RX ADMIN — ONDANSETRON 4 MG: 2 INJECTION INTRAMUSCULAR; INTRAVENOUS at 01:12

## 2022-12-05 RX ADMIN — SENNOSIDES AND DOCUSATE SODIUM 1 TABLET: 50; 8.6 TABLET ORAL at 09:12

## 2022-12-05 RX ADMIN — PREGABALIN 75 MG: 75 CAPSULE ORAL at 10:12

## 2022-12-05 RX ADMIN — OXYCODONE 5 MG: 5 TABLET ORAL at 04:12

## 2022-12-05 RX ADMIN — SODIUM CHLORIDE: 0.9 INJECTION, SOLUTION INTRAVENOUS at 12:12

## 2022-12-05 RX ADMIN — SODIUM CHLORIDE: 0.9 INJECTION, SOLUTION INTRAVENOUS at 04:12

## 2022-12-05 RX ADMIN — ACETAMINOPHEN 1000 MG: 10 INJECTION INTRAVENOUS at 04:12

## 2022-12-05 RX ADMIN — METHOCARBAMOL 750 MG: 750 TABLET ORAL at 09:12

## 2022-12-05 RX ADMIN — FENTANYL CITRATE 25 MCG: 50 INJECTION INTRAMUSCULAR; INTRAVENOUS at 04:12

## 2022-12-05 RX ADMIN — MIDAZOLAM HYDROCHLORIDE 2 MG: 1 INJECTION, SOLUTION INTRAMUSCULAR; INTRAVENOUS at 01:12

## 2022-12-05 RX ADMIN — CEFAZOLIN SODIUM 2 G: 2 SOLUTION INTRAVENOUS at 01:12

## 2022-12-05 RX ADMIN — SODIUM CHLORIDE: 0.9 INJECTION, SOLUTION INTRAVENOUS at 11:12

## 2022-12-05 RX ADMIN — PREGABALIN 75 MG: 75 CAPSULE ORAL at 09:12

## 2022-12-05 RX ADMIN — TRANEXAMIC ACID 1000 MG: 100 INJECTION, SOLUTION INTRAVENOUS at 01:12

## 2022-12-05 RX ADMIN — FENTANYL CITRATE 25 MCG: 50 INJECTION, SOLUTION INTRAMUSCULAR; INTRAVENOUS at 02:12

## 2022-12-05 RX ADMIN — DEXAMETHASONE SODIUM PHOSPHATE 8 MG: 4 INJECTION, SOLUTION INTRAMUSCULAR; INTRAVENOUS at 01:12

## 2022-12-05 RX ADMIN — MUPIROCIN 1 G: 20 OINTMENT TOPICAL at 10:12

## 2022-12-05 RX ADMIN — ASPIRIN 81 MG: 81 TABLET, COATED ORAL at 09:12

## 2022-12-05 RX ADMIN — ACETAMINOPHEN 1000 MG: 500 TABLET ORAL at 10:12

## 2022-12-05 RX ADMIN — FAMOTIDINE 20 MG: 20 TABLET ORAL at 09:12

## 2022-12-05 RX ADMIN — FENTANYL CITRATE 50 MCG: 50 INJECTION, SOLUTION INTRAMUSCULAR; INTRAVENOUS at 01:12

## 2022-12-05 RX ADMIN — KETAMINE HYDROCHLORIDE 20 MG: 100 INJECTION INTRAMUSCULAR; INTRAVENOUS at 01:12

## 2022-12-05 RX ADMIN — CEFAZOLIN SODIUM 2 G: 2 SOLUTION INTRAVENOUS at 09:12

## 2022-12-05 RX ADMIN — NICARDIPINE HYDROCHLORIDE 0.6 MCG: 25 INJECTION INTRAVENOUS at 01:12

## 2022-12-05 RX ADMIN — LIDOCAINE HYDROCHLORIDE 75 MG: 20 INJECTION INTRAVENOUS at 01:12

## 2022-12-05 RX ADMIN — PROPOFOL 50 MCG/KG/MIN: 10 INJECTION, EMULSION INTRAVENOUS at 01:12

## 2022-12-05 RX ADMIN — PROPOFOL 30 MG: 10 INJECTION, EMULSION INTRAVENOUS at 01:12

## 2022-12-05 RX ADMIN — VANCOMYCIN HYDROCHLORIDE 1500 MG: 1.5 INJECTION, POWDER, LYOPHILIZED, FOR SOLUTION INTRAVENOUS at 11:12

## 2022-12-05 RX ADMIN — SODIUM CHLORIDE, SODIUM GLUCONATE, SODIUM ACETATE, POTASSIUM CHLORIDE, MAGNESIUM CHLORIDE, SODIUM PHOSPHATE, DIBASIC, AND POTASSIUM PHOSPHATE: .53; .5; .37; .037; .03; .012; .00082 INJECTION, SOLUTION INTRAVENOUS at 02:12

## 2022-12-05 RX ADMIN — CELECOXIB 400 MG: 200 CAPSULE ORAL at 10:12

## 2022-12-05 RX ADMIN — MUPIROCIN 1 G: 20 OINTMENT TOPICAL at 09:12

## 2022-12-05 RX ADMIN — OXYCODONE 5 MG: 5 TABLET ORAL at 07:12

## 2022-12-05 RX ADMIN — LIDOCAINE HYDROCHLORIDE,EPINEPHRINE BITARTRATE 4 ML: 15; .005 INJECTION, SOLUTION EPIDURAL; INFILTRATION; INTRACAUDAL; PERINEURAL at 02:12

## 2022-12-05 NOTE — PROGRESS NOTES
Pre op completed. All concerns addressed. Patient belongings placed in PP4. Bed in lowest position. Call light within reach. Family at beside.

## 2022-12-05 NOTE — PT/OT/SLP EVAL
Occupational Therapy   Co-Evaluation    Name: Coy Rosado Jr.  MRN: [id number]  Admitting Diagnosis: <principal problem not specified>  Recent Surgery: Procedure(s) (LRB):  ARTHROPLASTY, KNEE, TOTAL, USING COMPUTER-ASSISTED NAVIGATION: LURDES: RIGHT: MARTHA - NENA (Right) Day of Surgery    Recommendations:     Discharge Recommendations: home  Discharge Equipment Recommendations:  bedside commode, bath bench, walker, rolling  Barriers to discharge:  None    Assessment:     Coy Rosado Jr. is a 52 y.o. male with a medical diagnosis of <principal problem not specified>.  He presents with s/p R TKA seen in PACU. Pt completed supine>sit with min (A), sit>supine with mod (A) x2 people, UBD with mod (A), and toileting with mod (A) for balance in standing. SpO2 removed prior to bed mobility. Following STS from EOB to complete voiding in urinal, pt observed with buckling in R knee requiring min (A) x2 people for balance and blocking of R knee. Functional ambulation deferred due to buckling. Once returned to supine, pt's O2 dropped in low 80s on RA. Vitals recovered to mid 90s within ~1 minute with verbal cues for pursed lip breathing and RN donned non-re breather mask. Performance deficits affecting function: weakness, impaired endurance, impaired self care skills, impaired functional mobility, gait instability, impaired balance, decreased lower extremity function, decreased safety awareness, pain, impaired coordination, decreased ROM, orthopedic precautions, impaired cardiopulmonary response to activity.      Rehab Prognosis: Good; patient would benefit from acute skilled OT services to address these deficits and reach maximum level of function.       Plan:     Patient to be seen  daily to address the above listed problems via self-care/home management, therapeutic activities, therapeutic exercises  Plan of Care Expires: 12/07/22  Plan of Care Reviewed with: patient    Subjective     Chief Complaint: needing to  urinate  Patient/Family Comments/goals: return to PLOF and work out on elliptical    Occupational Profile:  Living Environment: Pt lives with his spouse in a SSH with 1 DALE. He has a t/s combo  Previous level of function: I PTA  Roles and Routines: ; pt (+) drives  Equipment Used at Home: none  Assistance upon Discharge: wife    Pain/Comfort:  Pain Rating 1:  (pt did not rate)  Location - Side 1: Right  Location - Orientation 1: generalized  Location 1: knee  Pain Addressed 1: Pre-medicate for activity, Reposition, Distraction  Pain Rating Post-Intervention 1:  (pt did not rate)    Patients cultural, spiritual, Buddhism conflicts given the current situation: no    Objective:   Co-treatment performed due to patient's multiple deficits requiring two skilled therapists to appropriately and safely assess patient's strength and endurance while facilitating functional tasks in addition to accommodating for patient's activity tolerance.      Additional staff present:  SRIRAM garza and KRYSTAL Cordoba    Communicated with: RN prior to session.  Patient found HOB elevated with cryotherapy, FCD, oxygen, blood pressure cuff, telemetry, pulse ox (continuous), peripheral IV upon OT entry to room.    General Precautions: Standard, fall  Orthopedic Precautions: RLE weight bearing as tolerated  Braces: N/A  Respiratory Status: Nasal cannula, flow 6 L/min    Occupational Performance:    Bed Mobility:    Patient completed Scooting/Bridging in supine x2 trials toward HOB with min (A) to stabilize L LE and verbal/physical cues for hand placement on bed rails to assist  Patient completed Supine to Sit with minimum assistance x2 people trunk management  HOB elevated  Patient completed Sit to Supine with moderate assistance x2 people for trunk and R LE management  HOB flat    Functional Mobility/Transfers:  Patient completed Sit <> Stand Transfer with contact guard assistance  with  rolling walker   Min (A) x2 people required to  steady once standing fully upright in static stance  Pt tolerated standing from EOB for <5 minutes to void in urinal. Unilateral support on RW with L UE  Right lateral lean noted with pt unable to self correct  Returned to sit EOB due to buckling observed in R LE  Functional Mobility: Deferred due to buckling    Activities of Daily Living:  Upper Body Dressing: moderate assistance required to don/doff gown posteriorly  Toileting: minimum assistance x2 people for balance as pt voided in urinal in static stance from EOB; unilateral support maintained on RW    Cognitive/Visual Perceptual:  Cognitive/Psychosocial Skills:     -       Oriented to: Person, Place, Time, and Situation   -       Follows Commands/attention:Follows one-step commands  -       Safety awareness/insight to disability: impaired   -       Mood/Affect/Coping skills/emotional control: Cooperative and Lethargic    Physical Exam:  Upper Extremity Range of Motion:     -       Right Upper Extremity: WFL  -       Left Upper Extremity: WFL  Upper Extremity Strength:    -       Right Upper Extremity: WFL  -       Left Upper Extremity: WFL    AMPAC 6 Click ADL:  AMPAC Total Score: 14    Treatment & Education:  -Pt educated on hand placement for transfers  -Pt educated on RW placement for ADLs  -Pt educated on positioning of sx LE during performance of functional activities vs rest/sleep  -Pt educated on weightbearing and surgical precautions with pt verbalizing 1/1 correctly  -Pt educated to call for assistance and to transfer with hospital staff only  -Pt educated on role of OT and plan of care s/p surgery at [facility], white board updated     Patient left HOB elevated with all lines intact, call button in reach, and RN and RT present    GOALS:   Multidisciplinary Problems       Occupational Therapy Goals          Problem: Occupational Therapy    Goal Priority Disciplines Outcome Interventions   Occupational Therapy Goal     OT, PT/OT Ongoing,  Progressing    Description: Goals to be met by: 12/7/22     Patient will increase functional independence with ADLs by performing:    UE Dressing with Modified [place].  LE Dressing with Supervision.  Grooming while standing with Modified [place].  Toileting from toilet/bedside commode with Modified [place] for hygiene and clothing management.   Toilet transfer to toilet/bedside commode with Supervision and LRAD.                         History:     Past Medical History:   Diagnosis Date    Allergy     Diabetes mellitus, type 2     HTN (hypertension)     Low back pain          Past Surgical History:   Procedure Laterality Date    COLONOSCOPY      leg laceration  1984       Time Tracking:     OT Date of Treatment: 12/05/22  OT Start Time: 1631  OT Stop Time: 1647  OT Total Time (min): 16 min    Billable Minutes:Evaluation 8  Self Care/Home Management 8    12/5/2022

## 2022-12-05 NOTE — INTERVAL H&P NOTE
The patient has been examined and the H&P has been reviewed:    I concur with the findings and no changes have occurred since H+P was written    Surgery risks, benefits and alternative options discussed and understood by patient/family.    In accordance with [facility] notifications, [facility] policy (tier 2 orthopedic condition causing severe pain), and guidance from my /section head, I believe this to be a time sensitive procedure that needs to be performed because delay will cause pain, distress, worsening of the underlying disease process, and/or further negative outcomes for the patient.    I believe that use of LURDES robot for arthroplasty is necessary given the patients young age and high BMI putting him at increased risk for revision with the hopes that this can be mitigated by the improved accuracy of robotic assisted arthroplasty

## 2022-12-05 NOTE — PLAN OF CARE
Patient tolerated PT session fairly well. Patient performed supine to sit with min x2 and sit to supine with mod x2. Patient performed sit to stand with contact guard assist and RW. Patient required min assist x2 for standing balance with RW. Patients PT session limited due to low oxygen sats. Patient spO2 dropped to the 80s after standing requiring return to supine. Patient recovered to spO2 90s once supine. Patient to be seen for one more session prior to D/C.  Problem: Physical Therapy  Goal: Physical Therapy Goal  Description: Goals to be met by: 22     Patient will increase functional independence with mobility by performin. Supine to sit with supervision  2. Sit to stand transfer with Supervision  3. Gait x200 feet with Supervision using Rolling Walker  4. Ascend/Descend 1 curb step with Stand by assistance using Rolling Walker  5. Lower extremity exercise program x30 reps per handout, with supervision       Outcome: Ongoing, Progressing

## 2022-12-05 NOTE — PLAN OF CARE
Patient is AAO and VSS.  Tolerating PO and states pain is tolerable.  Dressing CDI.  Patient states they are ready for transfer. Caregiver at bedside.  Questions answered.  Both verbalized understanding.

## 2022-12-05 NOTE — ANESTHESIA PROCEDURE NOTES
CSE    Patient location during procedure: OR  Start time: 12/5/2022 1:12 PM  Timeout: 12/5/2022 1:12 PM  End time: 12/5/2022 1:18 PM      Staffing  Authorizing Provider: Primitivo Villar MD  Performing Provider: Primitivo Villar MD    Preanesthetic Checklist  Completed: patient identified, IV checked, site marked, risks and benefits discussed, surgical consent, monitors and equipment checked, pre-op evaluation and timeout performed  CSE  Patient position: sitting  Prep: ChloraPrep  Patient monitoring: continuous pulse ox and frequent blood pressure checks  Approach: midline  Spinal Needle  Needle type: Fabrizio   Needle gauge: 25 G  Needle length: 4 in  Epidural Needle  Injection technique: MAINSH air  Needle type: Tuohy   Needle gauge: 17 G  Needle length: 3.5 in  Needle insertion depth: 8 cm  Location: L3-4  Needle localization: anatomical landmarks   Catheter  Additional Documentation: negative aspiration for CSF, negative aspiration for heme and no paresthesia on injection  Assessment  Sensory level: T6   Dermatomal levels determined by alcohol swab  Intrathecal Medications:   administered: primary anesthetic mcg of    Medications:    Medications: Intrathecal - mepivacaine (CARBOCAINE) injection 15 mg/mL (1.5%) - Intrathecal   3 mL - 12/5/2022 1:17:00 PM

## 2022-12-05 NOTE — PROGRESS NOTES
In accordance with [facility] notifications, [facility] policy (tier 2 orthopedic condition causing severe pain), and guidance from my /section head, I believe this to be a time sensitive procedure that needs to be performed because delay will cause pain, distress, worsening of the underlying disease process, and/or further negative outcomes for the patient.    I believe that use of LURDES robot for arthroplasty is necessary given the patients young age and high BMI putting him at increased risk for revision with the hopes that this can be mitigated by the improved accuracy of robotic assisted arthroplasty

## 2022-12-05 NOTE — ASSESSMENT & PLAN NOTE
52 y.o. male POD1 s/p R TKA  Pain control: multimodal  PT/OT: WBAT RLE with walker  DVT PPx: ASA 81 BID, FCDs at all times when not ambulating  Abx: postop Ancef  Labs: reviewed  Drain: none  Mike: none    Dispo: DC home today

## 2022-12-05 NOTE — PT/OT/SLP EVAL
Physical Therapy Co-Evaluation and Treatment    Patient Name: Coy Rosado Jr.   MRN: [id number]  Recent Surgery: Procedure(s) (LRB):  ARTHROPLASTY, KNEE, TOTAL, USING COMPUTER-ASSISTED NAVIGATION: LURDES: RIGHT: MARTHA - TRIATHALON (Right) Day of Surgery    Recommendations:     Discharge Recommendations: outpatient PT   Discharge Equipment Recommendations: bedside commode, bath bench, walker, rolling   Barriers to discharge: None    Assessment:     Coy Rosado Jr. is a 52 y.o. male admitted with a medical diagnosis of <principal problem not specified>. He presents with the following impairments/functional limitations: weakness, gait instability, decreased lower extremity function, decreased safety awareness, pain, decreased ROM, orthopedic precautions. Patient tolerated PT session fairly well. Patient performed supine to sit with min x2 and sit to supine with mod x2. Patient performed sit to stand with contact guard assist and RW. Patient required min assist x2 for standing balance with RW. Patients PT session limited due to low oxygen sats. Patient spO2 dropped to the 80s after standing requiring return to supine. Patient recovered to spO2 90s once supine. Patient to be seen for one more session prior to D/C.    Patient co-evaluated with OT for increased level of assist to maintain safety.    Rehab Prognosis: Good; patient would benefit from acute skilled PT services to address these deficits and reach maximum level of function.  Recent Surgery: Procedure(s) (LRB):  ARTHROPLASTY, KNEE, TOTAL, USING COMPUTER-ASSISTED NAVIGATION: LURDES: RIGHT: MARTHA - TRIATHALON (Right) Day of Surgery    Plan:     During this hospitalization, patient to be seen   to address the identified rehab impairments via gait training, therapeutic activities, therapeutic exercises and progress toward the following goals:    Plan of Care Expires: 12/07/22    Subjective     Chief Complaint: pain and stiffness in R knee  Patient/Family  Comments/Goals: patient reports that he wishes to get back to his workout regimen using the elliptical  Pain/Comfort:  Pain Rating 1:  (pt did not rate)  Location - Side 1: Right  Location - Orientation 1: generalized  Location 1: knee  Pain Addressed 1: Pre-medicate for activity, Reposition, Distraction    Patients cultural, spiritual, Religion conflicts given the current situation: no    Social History:  Living Environment: Patient lives with their spouse in a single story home, number of outside stairs: 1 DALE .  Prior Level of Function: Prior to admission, patient was independent with ADLs.  DME owned (not currently used): none  Assistance Upon Discharge: significant other    Objective:     Communicated with nurse prior to session. Patient found HOB elevated with cryotherapy, FCD, blood pressure cuff, telemetry, pulse ox (continuous), peripheral IV upon PT entry to room.     General Precautions: Standard, fall   Orthopedic Precautions:RLE weight bearing as tolerated   Braces: N/A   Respiratory Status: Nasal cannula, flow 6 L/min    Exams:  RLE ROM: WFL  RLE Strength: WFL  LLE ROM: WFL  LLE Strength: WFL  Cognitive Exam: Patient is oriented to Person, Place, Time, Situation    Functional Mobility:  Bed Mobility:   Supine to Sit: minimum assistance of 2 persons for LE management and trunk management  Sit to Supine: moderate assistance of 2 persons for LE management and trunk management  Transfers:  Sit to Stand: contact guard assistance with rolling walker with cues for hand placement and foot placement  Balance:   Sitting: GOOD: Maintains balance through MODERATE excursions of active trunk movement  Standing: POOR+: required min assist x2 to maintain balance standing with RW for 5 minutes. Patients RLE buckled twice during standing. Patient used urinal in standing position.     Patients SpO2 dropped to 80s requiring return from standing to seated edge of bed and promotion of deep breathing and coughing  technique. Patients spO2 recovered to 90s.    Therapeutic Activities and Exercises:  Patient educated in:  -PT role and POC  -safety with transfers including hand placement  -OOB activity to maximize recovery including ambulating at home to prevent DVT   -deep breathing techniques  -to call nursing staff before attempting to get out of bed      AM-PAC 6 CLICK MOBILITY  Total Score:14     Patient left HOB elevated with all lines intact, call button in reach, and RN notified. Respiratory therapist present.    GOALS:   Multidisciplinary Problems       Physical Therapy Goals          Problem: Physical Therapy    Goal Priority Disciplines Outcome Goal Variances Interventions   Physical Therapy Goal     PT, PT/OT Ongoing, Progressing     Description: Goals to be met by: 22     Patient will increase functional independence with mobility by performin. Supine to sit with supervision  2. Sit to stand transfer with Supervision  3. Gait x200 feet with Supervision using Rolling Walker  4. Ascend/Descend 1 curb step with Stand by assistance using Rolling Walker  5. Lower extremity exercise program x30 reps per handout, with supervision                            History:     Past Medical History:   Diagnosis Date    Allergy     Diabetes mellitus, type 2     HTN (hypertension)     Low back pain        Past Surgical History:   Procedure Laterality Date    COLONOSCOPY      leg laceration         Time Tracking:     PT Received On: 22  PT Start Time: 1631  PT Stop Time: 1647  PT Total Time (min): 16 min     Billable Minutes: Evaluation 6 and Therapeutic Activity 10    2022

## 2022-12-05 NOTE — PLAN OF CARE
Problem: Occupational Therapy  Goal: Occupational Therapy Goal  Description: Goals to be met by: 12/7/22     Patient will increase functional independence with ADLs by performing:    UE Dressing with Modified [place].  LE Dressing with Supervision.  Grooming while standing with Modified [place].  Toileting from toilet/bedside commode with Modified [place] for hygiene and clothing management.   Toilet transfer to toilet/bedside commode with Supervision and LRAD.    Outcome: Ongoing, Progressing   OT eval complete with goals established. Pt completed supine>sit with min A, sit>supine with mod A x2 people, UBD with mod A, and toileting with mod A

## 2022-12-05 NOTE — OP NOTE
Johann Right TKA  OPERATIVE NOTE    Date of Operation:   12/5/2022    Providers Performing:   Surgeon(s):  Shane Wills III, MD  Assistant: Luis Alberto Villa MD    Operative Procedure:   Right Total Knee Arthroplasty (MAKOplasty) CPT 74399  Computer assisted surgical navigation CPT 22369    -22 modifier for CDC class 2 or higher obesity (BMI 38.32) requiring prolonged operative time and increased case complexity and difficulty      Preoperative Diagnosis:   Right Knee Osteoarthritis, ICD-10 M17.11    Postoperative Diagnosis:   SAME    Components Used:   Sudha  Femur: Triathlon CR Size 5  Tibia: Triathlon universal tibial baseplate Size 5  Patella: Triathlon X3 Symmetric Patella 36 mm  Tibial Insert: Triathlon fixed bearing CS inset size 9 mm, X3 poly  2 packs cement: Simplex    Implant Name Type Inv. Item Serial No.  Lot No. LRB No. Used Action   CEMENT BONE SURG SMPLX P RADPQ - [id number]  CEMENT BONE SURG SMPLX P RADPQ  SUDHA [employer]ES ELIG[employer]. [id number] Right 2 Implanted   BASEPLATE TIB TOTAL STAB SZ 5 - [id number]  BASEPLATE TIB TOTAL STAB SZ 5  SUDHA SALES ELIGIO. [id number] Right 1 Implanted   COMP FEM CRUCIATE FILOMENA DZ 5 RT - [id number]  COMP FEM CRUCIATE FILOMENA DZ 5 RT  SUDHA SALES ELIGIO. [id number] Right 1 Implanted   PATELLA TRI 36X10 X3 POLYETHYL - [id number]  PATELLA TRI 36X10 X3 POLYETHYL  SUDHA SALES ELIGIO. [id number] Right 1 Implanted   INSERT TIBIAL SZ 5 9MMX3 - [id number]  INSERT TIBIAL SZ 5 9MMX3  SUDHA SALES ELIGIO. [id number] Right 1 Implanted       Anesthesia:   Spinal+catheter    MARTIN cocktail: PILY    Estimated Blood Loss:   350 cc    Specimens:   None    Complications:   None    Indications:   The patient has failed non-operative measures including medications, injections and activity modifications for their knee.  After an explanation of risks and benefits of knee arthroplasty surgery, the patient wishes to proceed with surgery.    Operative Notes:   The patient was greeted in the pre-op holding area.  The  patients knee was marked with a surgical marker by the surgeon.  Spinal-Epidural anesthesia was administered by the anesthesia team.  The patient was placed in the supine position on the OR table with all bony prominences padded.  A tourniquet was not used.  IV antibiotics were administered.  The leg was prepped and draped in the usual sterile fashion.  A timeout was performed and the correct patient, site and procedure were identified.    The femoral and tibial guide pins where placed with pre-drilling and the arrays were positioned and tightened to the pins.     A midline incision was made with a standard medial parapatellar arthrotomy.  The standard medial capsular release was performed along with the lateral gutter.  The patella was mobilized from the lateral parapatellar ligament and bony osteophytes were removed.  The intercondylar notch was cleared of the ACL/PCL.    The hip was then brought through a range of motion and the hip center was identified, medial and lateral malleolus were identified and then began the registration process femur was registered first. The tibia was then registered. We were satisfied with the registration about the femoral and tibial size, it corresponded well on CT scan. Osteophytes were removed. We checked our alignment.     The joint was tensioned in extension and at 90 degrees of flexion to define our gaps. Working with the Gable Robot Tech, the implants were positioned virtually for appropriate size gaps and implant placement.     At this point, the hybris robotic arm was brought in. It was registered. We cut the femur and tibia. Care was taken during the burring process to protect the soft tissue.  Meniscal remnants were removed following burring.  The knee was brought into flexion and posterior osteophytes were removed.      At this time the trial implants were placed and knee assessed for stability, and flexion arc. The patella was prepared using free-hand technique and the  three-holed lug drill guide was sized and appropriately assessed. Patella tracking was found to be acceptable. The tibial component rotation was marked. The trials were removed. The tibial component was positioned and the keel was drilled and punched.    The wound was copiously irrigated with pulsitile lavage and the bony surfaces dried.  Cement was mixed on the back table and applied to all components.  Cementation of the femoral, tibial and patellar components was performed sequentially with removal of all excess cement. A trial insert was placed to provide compression while the cement dried and a 0.35% betadine solution was left to soak in the surgical field.     After the cement was dry, the trial poly insert was removed. Hemostasis was obtained with bovie electrocautery.  The knee was again copiously irrigated with pulsatile lavage. The final polyethylene insert was placed and the knee reduced.      1 gram of vancomycin powder was placed in the knee. The arthrotomy was closed with interrupted #1 vicryl suture and #2 quill, the subcuticular layer was closed with 2-0 vicryl suture and a running 4-0 monocryl was used to closed the skin surface.  Pin sites were closed with 4-0 monocryl and skin glue. Surgicel sealant was placed over the top of the incision and sterile dressing placed over the wound. Appropriately sized TEDs hose were placed for edema control.     Sponge and needle counts were correct.    The first-assistant was critical to all steps of the operation, including retraction and leg stabilization during exposure and bone preparation, as well as the deep and superficial wound closure.  Dr. Wills performed and/or supervised the key portions of this surgical procedure, including evaluation of the bone cuts, reshaping of the bony elements, and insertion of the provisional and final components.    Postoperative Plan:  The patient will be anticoagulated with 81mg aspirin twice daily for one month.   They  will receive 24 hours of post-operative antibiotics.    Activity will be weight bearing as tolerated.    Due patient and or surgical factors the patient will receive an Rx for cefadroxil 500mg BID x7 days after discharge per [place] data:   Crow MM, El BARNETT, Leonardo LARRY, Lalo RM. [facility] Clinical Research Award: Extended Oral Antibiotics Prevent Periprosthetic Joint Infection in High-Risk Cases: 3855 Patients With 1-Year Follow-Up. J Arthroplasty. 2021 Jul;36(7S):S18-S25. doi: 10.1016/[email or web address].2021.01.051. Epub 2021 Jan 23. PMID: [id number].      Signed by: Shane Wills III, MD

## 2022-12-05 NOTE — NURSING TRANSFER
Nursing Transfer Note      12/5/2022     Reason patient is being transferred: extended recovery    Transfer To: recovery suites    Transfer via bed    Transfer with 4L NC    Transported by RN/PCT    Any special needs or follow-up needed: n/a    Chart send with patient: Yes    Notified: spouse    Upon arrival to floor: patient oriented to room, call bell in reach, and bed in lowest position

## 2022-12-06 ENCOUNTER — TELEPHONE (OUTPATIENT)
Dept: INTERNAL MEDICINE | Facility: CLINIC | Age: 52
End: 2022-12-06
Payer: COMMERCIAL

## 2022-12-06 VITALS
WEIGHT: 252 LBS | OXYGEN SATURATION: 97 % | RESPIRATION RATE: 18 BRPM | BODY MASS INDEX: 38.19 KG/M2 | HEART RATE: 85 BPM | HEIGHT: 68 IN | TEMPERATURE: 98 F | SYSTOLIC BLOOD PRESSURE: 141 MMHG | DIASTOLIC BLOOD PRESSURE: 75 MMHG

## 2022-12-06 LAB — POCT GLUCOSE: 117 MG/DL (ref 70–110)

## 2022-12-06 PROCEDURE — 97110 THERAPEUTIC EXERCISES: CPT | Mod: CQ

## 2022-12-06 PROCEDURE — 97535 SELF CARE MNGMENT TRAINING: CPT

## 2022-12-06 PROCEDURE — 63600175 PHARM REV CODE 636 W HCPCS: Performed by: STUDENT IN AN ORGANIZED HEALTH CARE EDUCATION/TRAINING PROGRAM

## 2022-12-06 PROCEDURE — 97530 THERAPEUTIC ACTIVITIES: CPT

## 2022-12-06 PROCEDURE — 94660 CPAP INITIATION&MGMT: CPT

## 2022-12-06 PROCEDURE — 99203 PR OFFICE/OUTPT VISIT, NEW, LEVL III, 30-44 MIN: ICD-10-PCS | Mod: ,,, | Performed by: ANESTHESIOLOGY

## 2022-12-06 PROCEDURE — 25000003 PHARM REV CODE 250: Performed by: NURSE PRACTITIONER

## 2022-12-06 PROCEDURE — 25000003 PHARM REV CODE 250: Performed by: STUDENT IN AN ORGANIZED HEALTH CARE EDUCATION/TRAINING PROGRAM

## 2022-12-06 PROCEDURE — 94761 N-INVAS EAR/PLS OXIMETRY MLT: CPT

## 2022-12-06 PROCEDURE — 97530 THERAPEUTIC ACTIVITIES: CPT | Mod: CQ

## 2022-12-06 PROCEDURE — 99900035 HC TECH TIME PER 15 MIN (STAT)

## 2022-12-06 PROCEDURE — 97116 GAIT TRAINING THERAPY: CPT | Mod: CQ

## 2022-12-06 PROCEDURE — 99203 OFFICE O/P NEW LOW 30 MIN: CPT | Mod: ,,, | Performed by: ANESTHESIOLOGY

## 2022-12-06 RX ORDER — CELECOXIB 200 MG/1
200 CAPSULE ORAL DAILY
Status: DISCONTINUED | OUTPATIENT
Start: 2022-12-06 | End: 2022-12-06 | Stop reason: HOSPADM

## 2022-12-06 RX ADMIN — SENNOSIDES AND DOCUSATE SODIUM 1 TABLET: 50; 8.6 TABLET ORAL at 08:12

## 2022-12-06 RX ADMIN — LOSARTAN POTASSIUM 50 MG: 25 TABLET, FILM COATED ORAL at 08:12

## 2022-12-06 RX ADMIN — CELECOXIB 200 MG: 200 CAPSULE ORAL at 08:12

## 2022-12-06 RX ADMIN — ASPIRIN 81 MG: 81 TABLET, COATED ORAL at 08:12

## 2022-12-06 RX ADMIN — MUPIROCIN 1 G: 20 OINTMENT TOPICAL at 09:12

## 2022-12-06 RX ADMIN — METHOCARBAMOL 750 MG: 750 TABLET ORAL at 08:12

## 2022-12-06 RX ADMIN — CEFAZOLIN SODIUM 2 G: 2 SOLUTION INTRAVENOUS at 05:12

## 2022-12-06 RX ADMIN — FAMOTIDINE 20 MG: 20 TABLET ORAL at 08:12

## 2022-12-06 RX ADMIN — POLYETHYLENE GLYCOL 3350 17 G: 17 POWDER, FOR SOLUTION ORAL at 08:12

## 2022-12-06 NOTE — TELEPHONE ENCOUNTER
----- Message from Lucinda Lantigua sent at 12/6/2022 10:28 AM CST -----  Contact: [phone]  Requesting an RX refill or new RX.  Is this a refill or new RX:  New Rx  RX name and strength (copy/paste from chart):  Glucose machine and test strips  Is this a 30 day or 90 day RX:   Pharmacy name and phone # (copy/paste from chart):  [facility] [address], [place] - [address]   Phone:  [phone]  Fax:  [phone]        The doctors have asked that we provide their patients with the following 2 reminders -- prescription refills can take up to 72 hours, and a friendly reminder that in the future you can use your [facility] account to request refills:     Patient still at the [facility]. Thank you

## 2022-12-06 NOTE — DISCHARGE SUMMARY
[facility])  Orthopedics  Discharge Summary      Patient Name: Coy Rosado Jr.  MRN: [id number]  Admission Date: 12/5/2022  Hospital Length of Stay: 1 days  Discharge Date and Time: 12/6/2022 11:21 AM  Attending Physician: No att. providers found   Discharging Provider: Luis Alberto Villa MD  Primary Care Provider: Shorty Weiss MD    HPI: CC:  Right knee pain     Coy Rosado Jr. is a 51 y.o. male with history of Right knee pain. Pain is worse with activity and weight bearing.  Patient has experienced interference of activities of daily living due to decreased range of motion and an increase in joint pain and swelling.  Patient has failed non-operative treatment including NSAIDs, corticosteroid injections, viscosupplement injections, and activity modification.  Coy Rosado Jr. currently ambulates independently.      Relevant medical conditions of significance in perioperative period:  HTN- on medication managed by pcp  CHERRY- on CPAP    Procedure(s) (LRB):  ARTHROPLASTY, KNEE, TOTAL, USING COMPUTER-ASSISTED NAVIGATION: LURDES: RIGHT: MARTHA - NENA (Right)      Hospital Course: Patient presented for above procedure.  Tolerated it well and was discharged home POD1 after voiding, tolerating diet, ambulating, pain controlled.  Discharge instructions, follow-up appointment, and med rec are below.          Significant Diagnostic Studies: Labs: All labs within the past 24 hours have been reviewed    Pending Diagnostic Studies:       None          Final Active Diagnoses:    Diagnosis Date Noted POA    Primary osteoarthritis of right knee [M17.11] 08/09/2022 Yes      Problems Resolved During this Admission:      Discharged Condition: good    Disposition: Home or Self Care    Follow Up:    Patient Instructions:      Activity as tolerated     Sponge bath only until clinic visit     Keep surgical extremity elevated     Lifting restrictions   Order Comments: No strenuous exercise or lifting of > 10 lbs      Weight bearing as tolerated     No driving, operating heavy equipment or signing legal documents while taking pain medication     Leave dressing on - Keep it clean, dry, and intact until clinic visit   Order Comments: Do not remove surgical dressing for 2 weeks post-op. This will be done only by MD at initial post-op visit. If dressing is completely saturated, replace with identical dressing - silver-impregnated hydrocolloid dressing.     Do not get dressings wet. Do not shower.     If dressing continues to be saturated or there are signs of infection, please call [facility] [phone] for further instructions and to make appt to be seen.     Call MD for:  temperature >100.4     Call MD for:  persistent nausea and vomiting     Call MD for:  severe uncontrolled pain     Call MD for:  difficulty breathing, headache or visual disturbances     Call MD for:  redness, tenderness, or signs of infection (pain, swelling, redness, odor or green/yellow discharge around incision site)     Call MD for:  hives     Call MD for:  persistent dizziness or light-headedness     Call MD for:  extreme fatigue     Medications:  Reconciled Home Medications:      Medication List        START taking these medications      acetaminophen 650 MG Tbsr  Commonly known as: TYLENOL  Take 1 tablet (650 mg total) by mouth every 6 to 8 hours as needed (pain).     aspirin 81 MG EC tablet  Commonly known as: ECOTRIN  Take 1 tablet (81 mg total) by mouth 2 (two) times daily.     cefadroxil 500 MG Cap  Commonly known as: DURICEF  Take 1 capsule (500 mg total) by mouth every 12 (twelve) hours. for 7 days     docusate sodium 100 MG capsule  Commonly known as: COLACE  Take 1 capsule (100 mg total) by mouth 2 (two) times daily as needed for Constipation.     methocarbamoL 750 MG Tab  Commonly known as: ROBAXIN  Take 1 tablet (750 mg total) by mouth 3 (three) times daily as needed (muscle spasms).     oxyCODONE 5 MG immediate release tablet  Commonly  known as: ROXICODONE  Take 1-2 tablets by mouth every 4-6 hours as needed for pain            CHANGE how you take these medications      * celecoxib 200 MG capsule  Commonly known as: CeleBREX  Take 1 capsule (200 mg total) by mouth once daily.  What changed: You were already taking a medication with the same name, and this prescription was added. Make sure you understand how and when to take each.     * celecoxib 100 MG capsule  Commonly known as: CeleBREX  Take 100 mg by mouth.  What changed: Another medication with the same name was added. Make sure you understand how and when to take each.           * This list has 2 medication(s) that are the same as other medications prescribed for you. Read the directions carefully, and ask your doctor or other care provider to review them with you.                CONTINUE taking these medications      losartan 50 MG tablet  Commonly known as: COZAAR  Take 1 tablet by mouth once daily              Luis Alberto Villa MD  Orthopedics  [facility]

## 2022-12-06 NOTE — PT/OT/SLP PROGRESS
Physical Therapy Treatment    Patient Name:  Coy Rosado Jr.   MRN:  [id number]    Recommendations:     Discharge Recommendations: outpatient PT  Discharge Equipment Recommendations: bedside commode, bath bench, walker, rolling  Barriers to discharge: None    Assessment:     Coy Rosado Jr. is a 52 y.o. male admitted with a medical diagnosis of <principal problem not specified>.  He presents with the following impairments/functional limitations:  (weakness; impaired endurance; impaired self care skills; impaired functional mobility; gait instability; impaired balance; pain; decreased safety awareness; decreased lower extremity function; decreased ROM; orthopedic precautions) . presents with  improved overall functional mobility requiring decreased assistance and increased activity tolerance to perform functional mobility.Pt would continue to benefit from skilled PT to address overall functional mobility, to return to functional baseline and goals. Goals remain appropriate.      Rehab Prognosis: Good; patient would benefit from acute skilled PT services to address these deficits and reach maximum level of function.    Recent Surgery: Procedure(s) (LRB):  ARTHROPLASTY, KNEE, TOTAL, USING COMPUTER-ASSISTED NAVIGATION: LURDES: RIGHT: MARTHA BARRETT (Right) 1 Day Post-Op    Plan:     During this hospitalization, patient to be seen daily to address the identified rehab impairments via gait training, therapeutic activities, therapeutic exercises and progress toward the following goals:    Plan of Care Expires:  12/07/22    Subjective     Chief Complaint: pain and stiffness in R knee  Pain/Comfort:  Pain Rating 1: 0/10  Pain Rating Post-Intervention 1: 0/10      Objective:     Communicated with RN prior to session.  Patient found HOB elevated with  (cryotherapy; FCD; telemetry) upon PT entry to room.     General Precautions: Standard, fall  Orthopedic Precautions: RLE weight bearing as tolerated  Braces: N/A  Respiratory  Status: Room air     Functional Mobility:  Bed Mobility:     Supine to Sit: contact guard assistance  Sit to Supine: minimum assistance  Transfers:     Sit to Stand:  stand by assistance and contact guard assistance with rolling walker  Gait: pt amb with RW x 140 ft x 2 with SBA/CGA for safety    Pt asc/dec 2 inch curb step with RW with CGA with vcs for sequencing and safety  AM-PAC 6 CLICK MOBILITY  Turning over in bed (including adjusting bedclothes, sheets and blankets)?: 3  Sitting down on and standing up from a chair with arms (e.g., wheelchair, bedside commode, etc.): 3  Moving from lying on back to sitting on the side of the bed?: 3  Moving to and from a bed to a chair (including a wheelchair)?: 3  Need to walk in hospital room?: 3  Climbing 3-5 steps with a railing?: 3  Basic Mobility Total Score: 18       Treatment & Education:  Therapist provided instruction and educated of  patient on progress, safety,d/c,PT POC,   proper body mechanics, energy conservation, and fall prevention strategies during tasks listed above, on the effects of prolonged immobility and the importance of performing OOB activity and exercises to promote healing and reduce recovery time        Patient  facilitated therex  LE A/AAROM therex per TKR protocol. Patient required skilled PTA for instruction of exercises and appropriate cues to perform exercises safely, sequencing and appropriately.   Exercises performed to develop and maintain pt's strength, endurance and flexibility.  Updated white board with appropriate PT mobility information for medical team notification      Donned an extra gown  Pt safe to ambulate in hallway with RN or PCT assistance        Call nursing/pct to transfer to chair/use bathroom. Pt stated understanding        Bedside table in front of patient and area set up for function, convenience, and safety. RN aware of patient's mobility needs and status. Questions/concerns addressed within PTA scope of practice;  patient  with no further questions. Time was provided for active listening, discussion of health disposition, and discussion of safe discharge. Pt?verbalized?agreement .     Patient left up in chair with all lines intact, call button in reach, nsg notified  GOALS:   Multidisciplinary Problems       Physical Therapy Goals          Problem: Physical Therapy    Goal Priority Disciplines Outcome Goal Variances Interventions   Physical Therapy Goal     PT, PT/OT Ongoing, Progressing     Description: Goals to be met by: 22     Patient will increase functional independence with mobility by performin. Supine to sit with supervision  2. Sit to stand transfer with Supervision  3. Gait x200 feet with Supervision using Rolling Walker  4. Ascend/Descend 1 curb step with Stand by assistance using Rolling Walker  5. Lower extremity exercise program x30 reps per handout, with supervision                            Time Tracking:     PT Received On: 22  PT Start Time: 08     PT Stop Time: 0900  PT Total Time (min): 54 min     Billable Minutes: Gait Training 24, Therapeutic Activity 15, and Therapeutic Exercise 15    Treatment Type: Treatment  PT/PTA: PTA     PTA Visit Number: 1     2022

## 2022-12-06 NOTE — PROGRESS NOTES
[facility])  Orthopedics  Progress Note    Patient Name: Coy Rosado Jr.  MRN: [id number]  Admission Date: 12/5/2022  Hospital Length of Stay: 1 days  Attending Provider: Shane Wills III, MD  Primary Care Provider: Shorty Weiss MD  Follow-up For: Procedure(s) (LRB):  ARTHROPLASTY, KNEE, TOTAL, USING COMPUTER-ASSISTED NAVIGATION: LURDES: RIGHT: MARTHA - TRIATHALON (Right)    Post-Operative Day: 1 Day Post-Op  Subjective:     Principal Problem:<principal problem not specified>    Principal Orthopedic Problem: s/p right TKA    Interval History: Pt seen and examined at bedside. NAEO. He reports pain is controlled. Plans to work with PT today.       Review of patient's allergies indicates:  No Known Allergies    Current Facility-Administered Medications   Medication    0.9%  NaCl infusion    acetaminophen tablet 1,000 mg    albuterol inhaler 2 puff    aspirin EC tablet 81 mg    bisacodyL suppository 10 mg    diphenhydrAMINE injection 25 mg    EPINEPHrine (PF) injection 0.3 mg    famotidine tablet 20 mg    losartan tablet 50 mg    methocarbamoL tablet 1,500 mg    methocarbamoL tablet 750 mg    morphine injection 2 mg    mupirocin 2 % ointment 1 g    naloxone 0.4 mg/mL injection 0.02 mg    ondansetron injection 4 mg    oxyCODONE immediate release tablet 5 mg    oxyCODONE immediate release tablet Tab 10 mg    polyethylene glycol packet 17 g    pregabalin capsule 75 mg    prochlorperazine injection Soln 5 mg    senna-docusate 8.6-50 mg per tablet 1 tablet     Facility-Administered Medications Ordered in Other Encounters   Medication    dexAMETHasone injection    fentaNYL 50 mcg/mL injection    isolyte infusion    ketamine injection    LIDOcaine (cardiac) injection    LIDOcaine-EPINEPHrine (PF) 1.5%-1:200,000 injection    MEPIVAcaine 15 mg/mL (1.5 %) injection    midazolam injection    niCARdipine injection    ondansetron injection    propofol (DIPRIVAN) 10 mg/mL infusion  "   propofol (DIPRIVAN) 10 mg/mL infusion    sodium chloride 0.9% infusion    triamcinolone acetonide injection 40 mg     Objective:     Vital Signs (Most Recent):  Temp: 97.8 °F (36.6 °C) (12/06/22 0412)  Pulse: 77 (12/06/22 0412)  Resp: 17 (12/06/22 0412)  BP: 113/64 (12/06/22 0412)  SpO2: 98 % (12/06/22 0412) Vital Signs (24h Range):  Temp:  [97.6 °F (36.4 °C)-98.6 °F (37 °C)] 97.8 °F (36.6 °C)  Pulse:  [74-94] 77  Resp:  [15-21] 17  SpO2:  [91 %-99 %] 98 %  BP: (113-144)/(64-91) 113/64     Weight: 114.3 kg (252 lb)  Height: 5' 8" (172.7 cm)  Body mass index is 38.32 kg/m².      Intake/Output Summary (Last 24 hours) at 12/6/2022 0619  Last data filed at 12/6/2022 0003  Gross per 24 hour   Intake 1700 ml   Output 1000 ml   Net 700 ml       Ortho/SPM Exam    AAOx4  NAD  Reg rate  No increased WOB  Dressing c/d/i  Ice pack in place  SILT T/SP/DP/[place]/Sa  Motor intact T/SP/DP  WWP extremities  FCDs in place and functioning      Significant Labs: All pertinent labs within the past 24 hours have been reviewed.    Significant Imaging: I have reviewed and interpreted all pertinent imaging results/findings.    Assessment/Plan:     Primary osteoarthritis of right knee  52 y.o. male POD1 s/p R TKA  Pain control: multimodal  PT/OT: WBAT RLE with walker  DVT PPx: ASA 81 BID, FCDs at all times when not ambulating  Abx: postop Ancef  Labs: reviewed  Drain: none  Mike: none    Dispo: DC home today            Luis Alberto Villa MD  Orthopedics  [place] [facility] ([facility])  "

## 2022-12-06 NOTE — PT/OT/SLP PROGRESS
Occupational Therapy   Treatment/Discharge Summary    Name: Coy Rosado Jr.  MRN: [id number]  Admitting Diagnosis:  <principal problem not specified>  1 Day Post-Op    Recommendations:     Discharge Recommendations: home  Discharge Equipment Recommendations:  bedside commode, bath bench, walker, rolling  Barriers to discharge:  None    Assessment:     Coy Rosado Jr. is a 52 y.o. male with a medical diagnosis of <principal problem not specified>.  He presents with s/p R TKA. Pt completed toilet transfer with close SBA, grooming/hygiene at sink with mod (I), UBD with mod (I), and LBD with SBA. Observed with 1 LOB forward with attempt to stand without RW (I) and requiring mod (A) to recover. OT provided education on safety with transfers and RW use in order to to prevent falls. He acknowledged and verbalized understanding of all information discussed within OT scope of practice. Pt is appropriate for D/C home. Performance deficits affecting function are weakness, impaired endurance, impaired self care skills, impaired functional mobility, gait instability, impaired balance, pain, decreased safety awareness, decreased lower extremity function, decreased ROM, orthopedic precautions.     Rehab Prognosis:  Good; patient would benefit from acute skilled OT services to address these deficits and reach maximum level of function.       Plan:   D/C from OT    Subjective     Pain/Comfort:  Pain Rating 1: 0/10  Pain Rating Post-Intervention 1: 0/10    Objective:     Communicated with: Ana DUNAWAY prior to session.  Patient found up in chair with cryotherapy, FCD, telemetry upon OT entry to room.    General Precautions: Standard, fall    Orthopedic Precautions:RLE weight bearing as tolerated  Braces: N/A  Respiratory Status: Room air     Occupational Performance:     Bed Mobility:    Not assessed- pt OOB on BS chair    Functional Mobility/Transfers:  Patient completed Sit <> Stand Transfer with stand by assistance  with  rolling  walker   Cues for hand/foot placement  Patient completed Toilet Transfer Step Transfer technique with stand by assistance with  rolling walker  Functional Mobility: Pt completed functional ambulation of household distance BS chair<>toilet (BSC placed over) with close SBA and RW  1 LOB forward after pt performed STS from BS chair (I) without RW within arm reach. Mod (A) required to recover  Cues provided for RW management    Activities of Daily Living:  Grooming: modified independence to complete oral hygiene and wash face with wash cloth at sink  Upper Body Dressing: modified independence to don overhead shirts  Lower Body Dressing: stand by assistance to thread B LE through boxers/pants and manipulate above hips in standing; SBA to doff non-skid socks and don slip on shoes      [facility] 6 Click ADL: 24    Treatment & Education:  -Pt educated to dress surgical site first and further dressing techniques s/p surgery  -Pt educated on hand placement for transfers  -Pt educated on RW placement for ADLs  -Pt educated on proper foot wear s/p surgery  -Pt educated on car transfer technique  -Pt educated on positioning of sx LE during performance of functional activities vs rest/sleep  -Pt educated on weightbearing and surgical precautions with pt verbalizing 1/1 correctly  -Pt educated to call for assistance and to transfer with hospital staff only  -Pt educated on role of OT and plan of care s/p surgery at [facility], white board updated     Patient left up in chair with all lines intact, call button in reach, and RN notified    GOALS:   Multidisciplinary Problems       Occupational Therapy Goals          Problem: Occupational Therapy    Goal Priority Disciplines Outcome Interventions   Occupational Therapy Goal     OT, PT/OT Ongoing, Progressing    Description: Goals to be met by: 12/7/22     Patient will increase functional independence with ADLs by performing:    UE Dressing with Modified [place].  LE  Dressing with Supervision.  Grooming while standing with Modified [employer].  Toileting from toilet/bedside commode with Modified [employer] for hygiene and clothing management.   Toilet transfer to toilet/bedside commode with Supervision and LRAD.                         Time Tracking:     OT Date of Treatment: 12/06/22  OT Start Time: 0915  OT Stop Time: 0948  OT Total Time (min): 33 min    Billable Minutes:Self Care/Home Management 20  Therapeutic Activity 13    OT/NAE: OT          12/6/2022

## 2022-12-06 NOTE — ANESTHESIA POST-OP PAIN MANAGEMENT
Acute Pain Service and Perioperative Surgical Home Progress Note    HPI  Coy Rosado Jr. is a 52 y.o., male,     Interval history      Surgery:  Procedure(s) (LRB):  ARTHROPLASTY, KNEE, TOTAL, USING COMPUTER-ASSISTED NAVIGATION: LURDES: RIGHT: MARTHA - NENA (Right)    Post Op Day #: 1    Doing well POD1.  Utilized oxycodone 5 mg x2 overnight.  Pain well controlled this AM.    Problem List:    Active Hospital Problems    Diagnosis  POA    Primary osteoarthritis of right knee [M17.11]  Yes      Resolved Hospital Problems   No resolved problems to display.       Subjective:       General appearance of alert, oriented, no complaints   Pain with rest: 3    Numbers   Pain with movement: 6    Numbers   Side Effects    1. Pruritis No    2. Nausea No    3. Motor Blockade No, 0=Ability to raise lower extremities off bed    4. Sedation No, 1=awake and alert    Schedule Medications:    aspirin  81 mg Oral BID    celecoxib  200 mg Oral Daily    famotidine  20 mg Oral BID    losartan  50 mg Oral Daily    methocarbamoL  1,500 mg Oral Once    methocarbamoL  750 mg Oral TID    mupirocin  1 g Nasal BID    polyethylene glycol  17 g Oral Daily    pregabalin  75 mg Oral QHS    senna-docusate 8.6-50 mg  1 tablet Oral BID        Continuous Infusions:   sodium chloride 0.9% 150 mL/hr at 12/05/22 1647        PRN Medications:  acetaminophen, albuterol, bisacodyL, diphenhydrAMINE, EPINEPHrine (PF), morphine, naloxone, ondansetron, oxyCODONE, oxyCODONE, prochlorperazine       Antibiotics:  Antibiotics (From admission, onward)    Start     Stop Route Frequency Ordered    12/05/22 2100  mupirocin 2 % ointment 1 g         12/10 2059 Nasl 2 times daily 12/05/22 1732             Objective:            Vital Signs (Most Recent):  Temp: 36.7 °C (98.1 °F) (12/06/22 0807)  Pulse: 89 (12/06/22 0807)  Resp: 18 (12/06/22 0807)  BP: (!) 116/59 (12/06/22 0807)  SpO2: 97 % (12/06/22 0807) Vital Signs Range (Last 24H):  Temp:  [36.4 °C (97.6  °F)-37 °C (98.6 °F)]   Pulse:  [71-94]   Resp:  [15-21]   BP: (113-144)/(59-91)   SpO2:  [91 %-99 %]          I & O (Last 24H):    Intake/Output Summary (Last 24 hours) at 12/6/2022 0900  Last data filed at 12/6/2022 0003  Gross per 24 hour   Intake 1700 ml   Output 1000 ml   Net 700 ml       Physical Exam:    GA: Alert, comfortable, no acute distress.   Pulmonary: Clear to auscultation. Normal RR.    Cardiac: regular rate and rhythm.   Extrem: Warm, well-perfused.  Surgical site clean, dry, intact.     Laboratory: reviewed in chart  CBC: No results for input(s): WBC, RBC, HGB, HCT, PLT, MCV, MCH, MCHC in the last 72 hours.    BMP: No results for input(s): NA, K, CO2, CL, BUN, CREATININE, GLU, MG, PHOS, CALCIUM in the last 72 hours.    No results for input(s): PT, INR, PROTIME, APTT in the last 72 hours.          Assessment:         Pain control adequate    Plan:     1) Pain:Multimodal pain regimen ordered which includes acetaminophen, celecoxib, pregabalin, and prn oxycodone.  Will continue to monitor.   2)HTN: BPs within normal range   3)CHERRY: CPAP at night   4) FEN/GI: Tolerating regular diet.     5) Dispo: Pt working well with PT/OT. Case management and SW following along for setting up home health and physical therapy. Plan to discharge home today pending his PT/OT session.        Evaluator Dk Rivera MD

## 2022-12-06 NOTE — NURSING
Provided patient with pain scale card. Patient/family educated on the use of pain scale card. Patient/family educated re:side effects of new medications. Patient/family verbalized understanding.

## 2022-12-06 NOTE — SUBJECTIVE & OBJECTIVE
Principal Problem:<principal problem not specified>    Principal Orthopedic Problem: s/p right TKA    Interval History: Pt seen and examined at bedside. JEAN PAUL. He reports pain is controlled. Plans to work with PT today.       Review of patient's allergies indicates:  No Known Allergies    Current Facility-Administered Medications   Medication    0.9%  NaCl infusion    acetaminophen tablet 1,000 mg    albuterol inhaler 2 puff    aspirin EC tablet 81 mg    bisacodyL suppository 10 mg    diphenhydrAMINE injection 25 mg    EPINEPHrine (PF) injection 0.3 mg    famotidine tablet 20 mg    losartan tablet 50 mg    methocarbamoL tablet 1,500 mg    methocarbamoL tablet 750 mg    morphine injection 2 mg    mupirocin 2 % ointment 1 g    naloxone 0.4 mg/mL injection 0.02 mg    ondansetron injection 4 mg    oxyCODONE immediate release tablet 5 mg    oxyCODONE immediate release tablet Tab 10 mg    polyethylene glycol packet 17 g    pregabalin capsule 75 mg    prochlorperazine injection Soln 5 mg    senna-docusate 8.6-50 mg per tablet 1 tablet     Facility-Administered Medications Ordered in Other Encounters   Medication    dexAMETHasone injection    fentaNYL 50 mcg/mL injection    isolyte infusion    ketamine injection    LIDOcaine (cardiac) injection    LIDOcaine-EPINEPHrine (PF) 1.5%-1:200,000 injection    MEPIVAcaine 15 mg/mL (1.5 %) injection    midazolam injection    niCARdipine injection    ondansetron injection    propofol (DIPRIVAN) 10 mg/mL infusion    propofol (DIPRIVAN) 10 mg/mL infusion    sodium chloride 0.9% infusion    triamcinolone acetonide injection 40 mg     Objective:     Vital Signs (Most Recent):  Temp: 97.8 °F (36.6 °C) (12/06/22 0412)  Pulse: 77 (12/06/22 0412)  Resp: 17 (12/06/22 0412)  BP: 113/64 (12/06/22 0412)  SpO2: 98 % (12/06/22 0412) Vital Signs (24h Range):  Temp:  [97.6 °F (36.4 °C)-98.6 °F (37 °C)] 97.8 °F (36.6 °C)  Pulse:  [74-94] 77  Resp:  [15-21] 17  SpO2:  [91 %-99 %] 98 %  BP:  "(113144)/(64-91) 113/64     Weight: 114.3 kg (252 lb)  Height: 5' 8" (172.7 cm)  Body mass index is 38.32 kg/m².      Intake/Output Summary (Last 24 hours) at 12/6/2022 0619  Last data filed at 12/6/2022 0003  Gross per 24 hour   Intake 1700 ml   Output 1000 ml   Net 700 ml       Ortho/SPM Exam    AAOx4  NAD  Reg rate  No increased WOB  Dressing c/d/i  Ice pack in place  SILT T/SP/DP/[place]/Sa  Motor intact T/SP/DP  WWP extremities  FCDs in place and functioning      Significant Labs: All pertinent labs within the past 24 hours have been reviewed.    Significant Imaging: I have reviewed and interpreted all pertinent imaging results/findings.  "

## 2022-12-06 NOTE — PLAN OF CARE
Plan of care reviewed with patient; verbalized understanding.   Medications reviewed and administered as ordered.  Rounding for safety and patient care per policy.   Safety precautions maintained. Patient working appropriately with PT/OT.  DME at bedside.  Call light within reach, bed wheels locked, bed in lowest position, side rails ^x2, safety maintained. NADN, Will continue monitor.        Problem: Infection (Knee Arthroplasty)  Goal: Absence of Infection Signs and Symptoms  Intervention: Prevent or Manage Infection  Flowsheets (Taken 12/6/2022 9772)  Infection Prevention:   personal protective equipment utilized   single patient room provided   hand hygiene promoted  Infection Management: aseptic technique maintained

## 2022-12-06 NOTE — NURSING
Report received. Care assumed. Patient arrived to unit AAOx4 in hospital bed from PACU. VSS, IVF infusing. Dressing to (R) knee, CDI.  Pt lying supine in bed. Pt denies pain or any other concerns at this time. See assessment. Patient oriented to room. Bed in lowest position, side rails up x2, bed wheels locked and call light within reach.  Pt instructed to call for assistance, verbalized understanding. NADN. Will continue to monitor.

## 2022-12-07 ENCOUNTER — CLINICAL SUPPORT (OUTPATIENT)
Dept: REHABILITATION | Facility: HOSPITAL | Age: 52
End: 2022-12-07
Attending: ORTHOPAEDIC SURGERY
Payer: COMMERCIAL

## 2022-12-07 ENCOUNTER — CLINICAL SUPPORT (OUTPATIENT)
Dept: ORTHOPEDICS | Facility: CLINIC | Age: 52
End: 2022-12-07
Payer: COMMERCIAL

## 2022-12-07 DIAGNOSIS — R26.89 GAIT, ANTALGIC: Primary | ICD-10-CM

## 2022-12-07 DIAGNOSIS — Z96.659 STATUS POST KNEE REPLACEMENT, UNSPECIFIED LATERALITY: Primary | ICD-10-CM

## 2022-12-07 DIAGNOSIS — M25.561 ACUTE PAIN OF RIGHT KNEE: ICD-10-CM

## 2022-12-07 PROCEDURE — 97110 THERAPEUTIC EXERCISES: CPT | Mod: PN

## 2022-12-07 PROCEDURE — 97140 MANUAL THERAPY 1/> REGIONS: CPT | Mod: PN

## 2022-12-07 PROCEDURE — 99499 NO LOS: ICD-10-PCS | Mod: 95,,, | Performed by: ORTHOPAEDIC SURGERY

## 2022-12-07 PROCEDURE — 99499 UNLISTED E&M SERVICE: CPT | Mod: 95,,, | Performed by: ORTHOPAEDIC SURGERY

## 2022-12-07 PROCEDURE — 97162 PT EVAL MOD COMPLEX 30 MIN: CPT | Mod: PN

## 2022-12-07 NOTE — PROGRESS NOTES
[facility]   Physical Therapy Initial Evaluation     Date: 12/7/2022   Name: Coy Rosado JrRenata  Clinic Number: [id number]    Therapy Diagnosis:   Encounter Diagnoses   Name Primary?    Gait, antalgic Yes    Acute pain of right knee      Physician: Shane Wills III, *    Physician Orders: PT Eval and Treat   Medical Diagnosis from Referral: M17.11 (ICD-10-CM) - Primary osteoarthritis of right knee  Evaluation Date: 12/7/2022  Authorization Period Expiration: 12/31/2022  Plan of Care Expiration: 2/15/2023   Progress Note Due: 1/7/2023  Visit # / Visits authorized: 1/ 15   Remaining Visits Scheduled - 00  PTA Consecutive Visits - 0/6    Eval Visit FOTO - 29 (12/7/2022)   5th Visit FOTO  - (Date/Score/Turn Green)   10th Visit FOTO - (Date/Score/[place])   D/C FOTO -  (Date/Score/[place] Green)      Precautions: Standard and Fall     Time In: 11:45  Time Out: 12:30  Total Appointment Time (timed & untimed codes): 45 minutes        Please see Plan of Care notation section to view Coy Rosado JrRenata Initial Evaluation.       Shay Leone, PT,DPT

## 2022-12-07 NOTE — PLAN OF CARE
[facility]   Physical Therapy Initial Evaluation     Date: 12/7/2022   Name: Coy Rosado Jr.  Clinic Number: [id number]    Therapy Diagnosis:   Encounter Diagnoses   Name Primary?    Gait, antalgic Yes    Acute pain of right knee      Physician: Shane Wills III, *    Physician Orders: PT Eval and Treat   Medical Diagnosis from Referral: M17.11 (ICD-10-CM) - Primary osteoarthritis of right knee  Evaluation Date: 12/7/2022  Authorization Period Expiration: 12/31/2022  Plan of Care Expiration: 2/15/2023   Progress Note Due: 1/7/2023  Visit # / Visits authorized: 1/ 15   Remaining Visits Scheduled - 00  PTA Consecutive Visits - 0/6    Eval Visit FOTO - 29 (12/7/2022)   5th Visit FOTO  - (Date/Score/Turn Green)   10th Visit FOTO - (Date/Score/Turn Green)   D/C FOTO -  (Date/Score/Turn Green)      Precautions: Standard and Fall     Time In: 11:45  Time Out: 12:30  Total Appointment Time (timed & untimed codes): 45 minutes    SUBJECTIVE     Date of Surgery: 12/5/2022 - Right Total Knee Arthroplasty     Current Condition - Coy reports Physical Therapy 2 days s/p right Total Knee Arthroplasty. Mr. Rosado is finding it difficulty with sleeping due to the pain and lack of comfort in proper positioning of the lower extremity.     Type of Pain: Pain is located to the right knee and described as Sharp  Sleep: Pt is able to sleep for 4 hours before pain wakes them up.    Aggravating Factors: Standing, Bending, and Walking  Easing Factors: pain medication and ice    Work/School:  for [employer]   Hobbies/Exercise: Exercise and Playing with Grandchild   Physical Environment: Pt lives in a 1-story home with 1 steps to enter the home and lives with their spouse    Prior Level of Function: Modified Independent - Using a Rolling Walker  Current Level of Function: Independent     Pain:  Current 9/10,   Worst 10/10,   Best 4/10     Non-Mechanical Origin:  Night Pain?  Yes.   Sudden  bowel/bladder changes?  No.     Falls: No falls since surgery.   Red Flags: (-) Numbness/Tingling      Patient Goals: Patient would like to increase mobility and improve functional capacity.      Medical History:   Past Medical History:   Diagnosis Date    Allergy     Diabetes mellitus, type 2     HTN (hypertension)     Low back pain      Surgical History:   Coy Rosado Jr.  has a past surgical history that includes leg laceration (1984); Colonoscopy; and arthroplasty, knee, total, using computer-assisted navigation (Right, 12/5/2022).    Medications:   Coy has a current medication list which includes the following prescription(s): acetaminophen, aspirin, cefadroxil, celecoxib, celecoxib, docusate sodium, losartan, methocarbamol, and oxycodone, and the following Facility-Administered Medications: albuterol, dexamethasone, diphenhydramine, epinephrine, fentanyl, hyaluronate sodium, stabilized, hylan g-f 20, isolyte infusion, ketamine, lidocaine (cardiac), lidocaine-epinephrine (pf) 1.5%-1:200,000, mepivacaine, methylprednisolone sodium succinate, midazolam, nicardipine, ondansetron, ondansetron, propofol, propofol, sodium chloride 0.9% 500 mL flush bag, sodium chloride 0.9%, sodium chloride 0.9% infusion, and triamcinolone acetonide.    Allergies:   Review of patient's allergies indicates:  No Known Allergies      OBJECTIVE     Edema:    Left - 5 cm above (51 cm), at joint line (42 cm), 5 cm below (42.5cm)  Right - 5 cm above (44 cm), at joint line (43 cm), 5 cm below (38cm)    LOWER EXTREMITY AROM    Left Right    Knee Flexion 120° 80°   Knee Extension 0° -10°   LOWER EXTREMITY PROM   Knee Flexion 125° 80°   Knee Extension 0° -8°       LOWER EXTREMITY STRENGTH    Left  Right    Knee Extension 5/5 Not Tested    Knee Flexion 4+/5 Not Tested    Hip Flexion 4/5 Not Tested    Hip Abduction 4-/5 Not Tested    Hip Extension 4-/5    Hip ER 4/5    Hip IR 4/5      PALPATION:  Patient reports primary pain region along the  right knee, globally    STAIRS: Patient displays a step to gait pattern with stairs and a walker.     PATELLAR TRACKING:     Left Right    Superior Glide Normal Normal   Inferior Glide Normal Normal   Medial Glide Normal Normal   Lateral Glide Normal Normal       GAIT: Coy ambulates with RW with supervision.     GAIT DEVIATIONS: Coy displays a decreased gait speed with minimal knee flexion of the right knee.     Limitation/Restriction for FOTO LEFS Survey    Therapist reviewed FOTO scores for Coy Rosado Jr. on 12/7/2022.   FOTO documents entered into [employer] - see Media section.    Limitation Score: 13/80       TREATMENT     Total Treatment time (time-based codes) separate from Evaluation: 25 minutes      Coy received the treatments listed below:      therapeutic exercises to develop strength, endurance, ROM, and flexibility for 15 minutes including:  Short Arc Quad 2x10 with a 3' sec hold   Quad Set 2x10 with a 3' sec hold  Heel Digs 2x10 with a 3' sec hold   Heel Slides with Strap x30 with a 5' sec hold at end range   Standing Weight Shifts 2x10 with a 3' sec hold     manual therapy techniques: Joint mobilizations, Manual traction, Myofacial release, and Soft tissue Mobilization were applied to the: right knee for 10 minutes, including:  Passive range of motion with overpressure to the right knee in all planes   Grade 2 Mobilization of the Right Knee in all planes       PATIENT EDUCATION AND HOME EXERCISES     Education provided:   - Pt was educated on the prognosis and pathology of Right Total Knee Arthroplasty. The patient verbalized understanding and compliance with HEP.   - Pt/family was provided educational information, including: role of PT, goals for PT, scheduling - pt verbalized understanding. Discussed insurance limitations with pt.     Written Home Exercises Provided: yes. Exercises were reviewed and Coy was able to demonstrate them prior to the end of the session.  Coy demonstrated good   understanding of the education provided. See EMR under Patient Instructions for exercises provided during therapy sessions.    ASSESSMENT     Coy is a 52 y.o. male referred to outpatient Physical Therapy with a medical diagnosis of Right Total Knee Arthroplasty. Findings are consistent with Right Total Knee Arthroplasty to the right knee. Limitations are noted with walking/gait, standing, mobility, balance, strength, and activities of daily living . Primary impairment is pain, secondary to decreased ROM, endurance, strength, coordination, stability/balance, muscular activation, and function to the right knee. Pt. would benefit from PT to address impairments listed above to return to functional independence.    Intervention strategies designed to address these impairments will be instrumental in achieving the stated functional goals, including her ability to safely perform mobility tasks. Based on the examination, the patient's rehabilitation potential to achieve functional goals is good due to motivational factors.     Patient prognosis is Good.   Patient will benefit from skilled outpatient Physical Therapy to address the deficits stated above and in the chart below, provide patient /family education, and to maximize patientt's level of independence.     Plan of care discussed with patient: Yes  Patient's spiritual, cultural and educational needs considered and patient is agreeable to the plan of care and goals as stated below:     Anticipated Barriers for therapy: None.     Medical Necessity is demonstrated by the following  History  Co-morbidities and personal factors that may impact the plan of care Co-morbidities:   diabetes, high BMI, and HTN    Personal Factors:   attitudes     moderate   Examination  Body Structures and Functions, activity limitations and participation restrictions that may impact the plan of care Body Regions:   lower extremities    Body Systems:    gross symmetry  ROM  strength  gross  coordinated movement  balance  gait  transfers  transitions  motor control  motor learning  blood pressure    Participation Restrictions:   None.     Activity limitations:   Learning and applying knowledge  no deficits    General Tasks and Commands  no deficits    Communication  no deficits    Mobility  walking  driving (bike, car, motorcycle)    Self care  washing oneself (bathing, drying, washing hands)  caring for body parts (brushing teeth, shaving, grooming)  toileting  dressing    Domestic Life  shopping  cooking  doing house work (cleaning house, washing dishes, laundry)    Interactions/Relationships  intimate relationships    Life Areas  no deficits    Community and Social Life  no deficits         moderate   Clinical Presentation evolving clinical presentation with changing clinical characteristics moderate   Decision Making/ Complexity Score: moderate     Goals:  Short Term Goals: 5 weeks   Patient will be independent with HEP at home to increase PT compliance.     Patient will be independent with 110° of knee flexion and -5° of knee extension to increase mobility    Patient will be independent with sleeping for > 5 hours without waking up due to pain to increase sleep capacity           Long Term Goals: 10 weeks   Patient will decrease LEFS score to > 65/80 to increase functional capacity.     Patient will independently be able to perform 10 Sit to Stand with < 2/10 pain to the right knee to increase functional capacity.     Patient will be independent with ambulating for 700 yards with < 2/10 pain to increase community capacity.             PLAN   Plan of care Certification: 12/7/2022 to 2/15/2023 .    Outpatient Physical Therapy 2-3 times weekly for 10 weeks to include the following interventions: Electrical Stimulation (IFC), Gait Training, Manual Therapy, Moist Heat/ Ice, Neuromuscular Re-ed, Patient Education, Self Care, Therapeutic Activities, Therapeutic Exercise, Ultrasound, and Functional Dry  Needling.     Shay Leone, PT, DPT    Pt may be seen by PTA as part of the rehabilitation team.     Therapist: Shay Leone, PT, DPT 12/7/2022    I CERTIFY THE NEED FOR THESE SERVICES FURNISHED UNDER THIS PLAN OF TREATMENT AND WHILE UNDER MY CARE   Physician's comments:     Physician's Signature: ___________________________________________________

## 2022-12-07 NOTE — PROGRESS NOTES
I called the patient today regarding his surgery with Dr. Shane Wills III. The patient had a right TKA on 12/5.     Pain Scale: 7 / 10    Any issues with Fever: No.    Any issues with medications (specifically DVT prophylaxis): No. ASA 81 BID    Any issues with bowel movements:  Passing ze: No.                                                                 Urination: No.                                                                 Constipation: No. OTC laxative if needed    Completing at home exercises: Yes:     Any concerns regarding their dressing/bandage:  No.    Patient confirmed first OP-PT appointment:  [facility] on  12/7 at 1145 am.     Any other concerns: No.        The patient was informed that if they have any urgent issues with their bandage, medications or any other health concerns regarding their surgery to call the 24/7 Orthopedic Post-op Hot Line at [phone]. The patient was reminded that if they have any chest pain or shortness of breath to call 911 or go to the ER.    The patient verbalized understanding and does not have any other questions

## 2022-12-08 ENCOUNTER — PATIENT MESSAGE (OUTPATIENT)
Dept: ADMINISTRATIVE | Facility: OTHER | Age: 52
End: 2022-12-08
Payer: COMMERCIAL

## 2022-12-09 ENCOUNTER — CLINICAL SUPPORT (OUTPATIENT)
Dept: REHABILITATION | Facility: HOSPITAL | Age: 52
End: 2022-12-09
Attending: ORTHOPAEDIC SURGERY
Payer: COMMERCIAL

## 2022-12-09 DIAGNOSIS — M25.561 ACUTE PAIN OF RIGHT KNEE: ICD-10-CM

## 2022-12-09 DIAGNOSIS — R26.89 GAIT, ANTALGIC: Primary | ICD-10-CM

## 2022-12-09 PROCEDURE — 97110 THERAPEUTIC EXERCISES: CPT | Mod: PN,CQ

## 2022-12-09 PROCEDURE — 97116 GAIT TRAINING THERAPY: CPT | Mod: PN,CQ

## 2022-12-09 NOTE — PROGRESS NOTES
"Physical Therapy Daily Treatment Note         Name: Coy Rosado Jr.  Clinic Number: [id number]    Therapy Diagnosis:   Encounter Diagnoses   Name Primary?    Gait, antalgic Yes    Acute pain of right knee      Physician: Shane Wills III, *    Visit Date: 2022    Physician Orders: PT Eval and Treat   Medical Diagnosis from Referral: M17.11 (ICD-10-CM) - Primary osteoarthritis of right knee  Evaluation Date: 2022  Authorization Period Expiration: 2022  Plan of Care Expiration: 2/15/2023   Progress Note Due: 2023  Visit # / Visits authorized: 2/ 15   Remaining Visits Scheduled - 13  PTA Consecutive Visits -     5th Visit FOTO - (Date, Score/ turn green)  10th Visit FOTO -  (Date, Score/ turn green )  D/C FOTO - (Date, Score/ turn green )    Time In: 11:08  Time Out: 12:00  Billable Time: 52 minutes  Non-Billable Time: 00 minutes    Precautions: Standard and Fall  Insurance: Payor: AETNA / Plan: AETNA CHOICE POS / Product Type: Commercial /     Subjective     Pt reports: Knee is okay.  He is compliant with home exercise program.  Response to previous treatment: Session was good. "Good stretching"    Pre-Treatment Pain Ratin/10  Post-Treatment Pain Ratin/10  Location: right knee      Objective     Coy received therapeutic exercises to develop strength, endurance, ROM, and flexibility for 42 minutes including:    Warm-up      Table  Short Arc Quad 2x10 with a 3' sec hold   Quad Set 2x10 with a 3' sec hold  Heel Digs 2x10 with a 3' sec hold   Heel Slides with Strap x30 with a 5' sec hold at end range (performed for 5 min today)    Seated        Standing    Standing Weight Shifts 2x10 with a 3' sec hold       *Bold exercise performed     manual therapy techniques: Joint mobilizations, Manual traction, Myofacial release, and Soft tissue Mobilization were applied to the: right knee for 00 minutes, including:  Passive range of motion with overpressure to the right knee in all planes   Grade " 2 Mobilization of the Right Knee in all planes    Coy participated in gait training to improve functional mobility and safety for 10  minutes, including:    Coy Rosado Jr. ambulated 120 feet with rolling walker device with contact guard assistance assistance. Coy Rosado Jr. displays antalgic gait deviation with limited knee extension. Pt received verbal and tactile cues to improve gait deviations. Pt was Able to improve gait deviations with interventions. Cues given to focus on heel off and toe off to increase knee flexion during ambulation. Weight bearing on R leg was also increased      Home Exercises Provided and Patient Education Provided     Education provided:   - Continue with HEP    Written Home Exercises Provided: Patient instructed to cont prior HEP.  Exercises were reviewed and Coy was able to demonstrate them prior to the end of the session.  Coy demonstrated good  understanding of the education provided.     See EMR under Patient Instructions for exercises provided prior visit.    Assessment     Pt tolerated session well. Pt ambulated into clinic with RW and leg held in extension. Good quad activation noted with quad sets. SAQs tolerated well, pt was able to lift foot off of mat. Gait training performed to improve knee flexion with ambulation. Pt tolerated well and was able to improve gait pattern. PTA advised pt not to wear ice pack around knee while walking since it limits knee flexion, pt verbalized understanding. Coy had no adverse effects and will continue to benefit from skilled therapy.     Coy Is progressing well towards His goals.     Pt prognosis is Good.     Pt will continue to benefit from skilled outpatient physical therapy to address the deficits listed in the problem list box on initial evaluation, provide pt/family education and to maximize pt's level of independence in the home and community environment.     Pt's spiritual, cultural and educational needs considered and pt  agreeable to plan of care and goals.    Anticipated barriers to physical therapy: None    Goals:  Short Term Goals: 5 weeks   Patient will be independent with HEP at home to increase PT compliance.  Progressing 12/9/2022      Patient will be independent with 110° of knee flexion and -5° of knee extension to increase mobility Progressing 12/9/2022      Patient will be independent with sleeping for > 5 hours without waking up due to pain to increase sleep capacity  Progressing 12/9/2022               Long Term Goals: 10 weeks   Patient will decrease LEFS score to > 65/80 to increase functional capacity.      Patient will independently be able to perform 10 Sit to Stand with < 2/10 pain to the right knee to increase functional capacity.      Patient will be independent with ambulating for 700 yards with < 2/10 pain to increase community capacity.         Plan     Continued with current POC      Vazquez Jaimes PTA ,  12/9/2022

## 2022-12-12 ENCOUNTER — CLINICAL SUPPORT (OUTPATIENT)
Dept: REHABILITATION | Facility: HOSPITAL | Age: 52
End: 2022-12-12
Payer: COMMERCIAL

## 2022-12-12 DIAGNOSIS — R26.89 GAIT, ANTALGIC: Primary | ICD-10-CM

## 2022-12-12 DIAGNOSIS — M25.561 ACUTE PAIN OF RIGHT KNEE: ICD-10-CM

## 2022-12-12 PROCEDURE — 97530 THERAPEUTIC ACTIVITIES: CPT | Mod: PN

## 2022-12-12 PROCEDURE — 97140 MANUAL THERAPY 1/> REGIONS: CPT | Mod: PN

## 2022-12-12 PROCEDURE — 97110 THERAPEUTIC EXERCISES: CPT | Mod: PN

## 2022-12-12 NOTE — PROGRESS NOTES
"Physical Therapy Daily Treatment Note     Name: Coy Rosado Jr.  Clinic Number: [id number]    Therapy Diagnosis:   Encounter Diagnoses   Name Primary?    Gait, antalgic Yes    Acute pain of right knee      Physician: Shane Wills III, *    Visit Date: 2022    Physician Orders: PT Eval and Treat   Medical Diagnosis from Referral: M17.11 (ICD-10-CM) - Primary osteoarthritis of right knee  Evaluation Date: 2022  Authorization Period Expiration: 2022  Plan of Care Expiration: 2/15/2023   Progress Note Due: 2023  Visit # / Visits authorized: 3/ 15   Remaining Visits Scheduled - 12  PTA Consecutive Visits - 0/6    5th Visit FOTO - (Date, Score/ turn green)  10th Visit FOTO -  (Date, Score/ turn green )  D/C FOTO - (Date, Score/ turn green )    Time In: 11:00  Time Out: 11:45  Billable Time: 45 minutes  Non-Billable Time: 00 minutes    Precautions: Standard and Fall  Insurance: Payor: AETNA / Plan: AETNA CHOICE POS / Product Type: Commercial /     Subjective     Pt reports: no falls or injuries. Knee is stiff but overall good.   He is compliant with home exercise program.  Response to previous treatment: Session was good. "Good stretching"    Pre-Treatment Pain Ratin/10  Post-Treatment Pain Ratin/10  Location: right knee      Objective     Coy received therapeutic exercises to develop strength, endurance, ROM, and flexibility for 20 minutes including:    Warm-up      Table  Short Arc Quad 2x10 with a 3' sec hold (moderate assistance from Physical Therapist)  Quad Set 2x10 with a 3' sec hold  Heel Digs 2x10 with a 3' sec hold   Heel Slides with Strap x30 with a 5' sec hold at end range    Seated        Standing    Standing Weight Shifts 2x10 with a 3' sec hold   TKE 3x8 with a 3' sec hold       *Bold exercise performed     manual therapy techniques: Joint mobilizations, Manual traction, Myofacial release, and Soft tissue Mobilization were applied to the: right knee for 15 minutes, " including:  Passive range of motion with overpressure to the right knee in all planes   Grade 2 Mobilization of the Right Knee in all planes    Coy participated in gait training to improve functional mobility and safety for 00  minutes, including:    Coy Rosado Jr. ambulated 120 feet with rolling walker device with contact guard assistance assistance. Coy Rosado Jr. displays antalgic gait deviation with limited knee extension. Pt received verbal and tactile cues to improve gait deviations. Pt was Able to improve gait deviations with interventions. Cues given to focus on heel off and toe off to increase knee flexion during ambulation. Weight bearing on R leg was also increased    Coy participated in dynamic functional therapeutic activities to improve functional performance for 10  minutes, including:    Visit Number:  3 out of 15   Activities performed   (duration/resistance)     Stair Negotiation Training  6 reps                           Home Exercises Provided and Patient Education Provided     Education provided:   - Continue with HEP    Written Home Exercises Provided: Patient instructed to cont prior HEP.  Exercises were reviewed and Coy was able to demonstrate them prior to the end of the session.  Coy demonstrated good  understanding of the education provided.     See EMR under Patient Instructions for exercises provided prior visit.    Assessment     Physical Therapy completed the exercise program with no increase in pain to the right knee. Education was given and performed with stair negotiation and the use of the Rolling Walker. Patient required moderate levels of visual and verbal cues to perform the technique properly and contact guard at all times. No modifications were required with good progress noted by speeding up the process with repetitions. Mobility of the right knee is noted at about 85° of flexion and -3° of extension. Patient would continue to benefit from skilled Physical Therapy  to decrease pain and increase standing capacity.     Coy Is progressing well towards His goals.     Pt prognosis is Good.     Pt will continue to benefit from skilled outpatient physical therapy to address the deficits listed in the problem list box on initial evaluation, provide pt/family education and to maximize pt's level of independence in the home and community environment.     Pt's spiritual, cultural and educational needs considered and pt agreeable to plan of care and goals.    Anticipated barriers to physical therapy: None    Goals:  Short Term Goals: 5 weeks   Patient will be independent with HEP at home to increase PT compliance.  Progressing 12/12/2022      Patient will be independent with 110° of knee flexion and -5° of knee extension to increase mobility Progressing 12/12/2022      Patient will be independent with sleeping for > 5 hours without waking up due to pain to increase sleep capacity  Progressing 12/12/2022               Long Term Goals: 10 weeks   Patient will decrease LEFS score to > 65/80 to increase functional capacity.  Progressing 12/12/2022     Patient will independently be able to perform 10 Sit to Stand with < 2/10 pain to the right knee to increase functional capacity.  Progressing 12/12/2022    Patient will be independent with ambulating for 700 yards with < 2/10 pain to increase community capacity.  Progressing 12/12/2022        Plan     Progress duration, workload, and resistance levels of the Therapeutic Activities and Exercises if the patient does not exhibit any pain, swelling, or other symptoms. Progress instruction in-home exercise progression and modification, including symptom management.    Shay Leone, PT, DPT  12/12/2022

## 2022-12-14 ENCOUNTER — CLINICAL SUPPORT (OUTPATIENT)
Dept: REHABILITATION | Facility: HOSPITAL | Age: 52
End: 2022-12-14
Payer: COMMERCIAL

## 2022-12-14 DIAGNOSIS — R26.89 GAIT, ANTALGIC: Primary | ICD-10-CM

## 2022-12-14 DIAGNOSIS — M25.561 ACUTE PAIN OF RIGHT KNEE: ICD-10-CM

## 2022-12-14 PROCEDURE — 97110 THERAPEUTIC EXERCISES: CPT | Mod: PN

## 2022-12-14 PROCEDURE — 97530 THERAPEUTIC ACTIVITIES: CPT | Mod: PN

## 2022-12-14 PROCEDURE — 97112 NEUROMUSCULAR REEDUCATION: CPT | Mod: PN

## 2022-12-14 NOTE — PROGRESS NOTES
"Physical Therapy Daily Treatment Note     Name: Coy Rosado Jr.  Clinic Number: [id number]    Therapy Diagnosis:   Encounter Diagnoses   Name Primary?    Gait, antalgic Yes    Acute pain of right knee      Physician: Shane Wills III, *    Visit Date: 2022    Physician Orders: PT Eval and Treat   Medical Diagnosis from Referral: M17.11 (ICD-10-CM) - Primary osteoarthritis of right knee  Evaluation Date: 2022  Authorization Period Expiration: 2022  Plan of Care Expiration: 2/15/2023   Progress Note Due: 2023  Visit # / Visits authorized: 4/ 15   Remaining Visits Scheduled - 11  PTA Consecutive Visits - 0/6    5th Visit FOTO - (Date, Score/ turn green)  10th Visit FOTO -  (Date, Score/ turn green )  D/C FOTO - (Date, Score/ turn green )    Time In: 11:50  Time Out: 12:35  Billable Time: 45 minutes  Non-Billable Time: 00 minutes    Precautions: Standard and Fall  Insurance: Payor: [employer] / Plan: [employer] CHOICE POS / Product Type: Commercial /     Subjective     Pt reports: no falls or injuries to the right knee. It is feeling stiff.   He is compliant with home exercise program.  Response to previous treatment: Session was good. "Good stretching"    Pre-Treatment Pain Ratin/10  Post-Treatment Pain Ratin/10  Location: right knee      Objective     Coy received therapeutic exercises to develop strength, endurance, ROM, and flexibility for 20 minutes including:    Warm-up      Table  Short Arc Quad 2x10 with a 3' sec hold (moderate assistance from Physical Therapist)  Heel Slides with Strap x30 with a 5' sec hold at end range    Seated        Standing    Standing Weight Shifts 2x10 with a 3' sec hold   TKE 3x8 with a 3' sec hold       *Bold exercise performed     Coy participated in neuromuscular re-education activities to improve: Balance, Coordination, Proprioception, Posture, and Neuromuscular Control for 15 minutes. The following activities were included:  Contract-Relax Technique 3x5 " with a 3' sec hold (Quads)   Quad Set 2x10 with a 3' sec hold  Heel Digs 2x10 with a 3' sec hold     manual therapy techniques: Joint mobilizations, Manual traction, Myofacial release, and Soft tissue Mobilization were applied to the: right knee for 00 minutes, including:  Passive range of motion with overpressure to the right knee in all planes   Grade 2 Mobilization of the Right Knee in all planes    Coy participated in gait training to improve functional mobility and safety for 00  minutes, including:    Coy Rosado Jr. ambulated 120 feet with rolling walker device with contact guard assistance assistance. Coy Rosado Jr. displays antalgic gait deviation with limited knee extension. Pt received verbal and tactile cues to improve gait deviations. Pt was Able to improve gait deviations with interventions. Cues given to focus on heel off and toe off to increase knee flexion during ambulation. Weight bearing on R leg was also increased    Coy participated in dynamic functional therapeutic activities to improve functional performance for 10  minutes, including:    Visit Number:  4 out of 15   Activities performed   (duration/resistance)     Stair Negotiation Training  Not Performed    Ambulate in the clinic without AD  3 laps                       Home Exercises Provided and Patient Education Provided     Education provided:   - Continue with HEP    Written Home Exercises Provided: Patient instructed to cont prior HEP.  Exercises were reviewed and Coy was able to demonstrate them prior to the end of the session.  Coy demonstrated good  understanding of the education provided.     See EMR under Patient Instructions for exercises provided prior visit.    Assessment     Patient tolerated exercise program with moderate levels of discomfort. Increase in mobility with contract-relax technique was well received with frequent breaks needed due to pain. Mr. Rosado was able to ambulate without a walker for 3 laps with a  decrease in gait stride, knee flexion, and required breaks due to fatigue. No further modifications were need. Patient would continue to benefit from skilled Physical Therapy to increase knee mobility and strength.     Coy Is progressing well towards His goals.     Pt prognosis is Good.     Pt will continue to benefit from skilled outpatient physical therapy to address the deficits listed in the problem list box on initial evaluation, provide pt/family education and to maximize pt's level of independence in the home and community environment.     Pt's spiritual, cultural and educational needs considered and pt agreeable to plan of care and goals.    Anticipated barriers to physical therapy: None    Goals:  Short Term Goals: 5 weeks   Patient will be independent with HEP at home to increase PT compliance.  Progressing 12/14/2022      Patient will be independent with 110° of knee flexion and -5° of knee extension to increase mobility Progressing 12/14/2022      Patient will be independent with sleeping for > 5 hours without waking up due to pain to increase sleep capacity  Progressing 12/14/2022               Long Term Goals: 10 weeks   Patient will decrease LEFS score to > 65/80 to increase functional capacity.  Progressing 12/14/2022     Patient will independently be able to perform 10 Sit to Stand with < 2/10 pain to the right knee to increase functional capacity.  Progressing 12/14/2022    Patient will be independent with ambulating for 700 yards with < 2/10 pain to increase community capacity.  Progressing 12/14/2022        Plan     Progress duration, workload, and resistance levels of the Therapeutic Activities and Exercises if the patient does not exhibit any pain, swelling, or other symptoms. Progress instruction in-home exercise progression and modification, including symptom management.    Shay Leone, PT, DPT  12/14/2022

## 2022-12-16 ENCOUNTER — PATIENT MESSAGE (OUTPATIENT)
Dept: ORTHOPEDICS | Facility: CLINIC | Age: 52
End: 2022-12-16
Payer: COMMERCIAL

## 2022-12-16 ENCOUNTER — CLINICAL SUPPORT (OUTPATIENT)
Dept: REHABILITATION | Facility: HOSPITAL | Age: 52
End: 2022-12-16
Payer: COMMERCIAL

## 2022-12-16 DIAGNOSIS — Z96.659 STATUS POST KNEE REPLACEMENT, UNSPECIFIED LATERALITY: Primary | ICD-10-CM

## 2022-12-16 DIAGNOSIS — R26.89 GAIT, ANTALGIC: Primary | ICD-10-CM

## 2022-12-16 DIAGNOSIS — M25.561 ACUTE PAIN OF RIGHT KNEE: ICD-10-CM

## 2022-12-16 PROCEDURE — 97140 MANUAL THERAPY 1/> REGIONS: CPT | Mod: PN

## 2022-12-16 PROCEDURE — 97110 THERAPEUTIC EXERCISES: CPT | Mod: PN

## 2022-12-16 PROCEDURE — 97112 NEUROMUSCULAR REEDUCATION: CPT | Mod: PN

## 2022-12-16 RX ORDER — METHOCARBAMOL 750 MG/1
750 TABLET, FILM COATED ORAL 3 TIMES DAILY PRN
Qty: 30 TABLET | Refills: 0 | Status: SHIPPED | OUTPATIENT
Start: 2022-12-16 | End: 2023-01-07 | Stop reason: SDUPTHER

## 2022-12-16 NOTE — TRANSFER OF CARE
"Anesthesia Transfer of Care Note    Patient: Coy Rosado Jr.    Procedure(s) Performed: Procedure(s) (LRB):  ARTHROPLASTY, KNEE, TOTAL, USING COMPUTER-ASSISTED NAVIGATION: LURDES: RIGHT: MARTHA BARRETT (Right)    Patient location: PACU    Anesthesia Type: CSE    Transport from OR: Transported from OR on 6-10 L/min O2 by face mask with adequate spontaneous ventilation    Post pain: adequate analgesia    Post assessment: no apparent anesthetic complications and tolerated procedure well    Post vital signs: stable    Level of consciousness: awake and alert    Nausea/Vomiting: no nausea/vomiting    Complications: none    Transfer of care protocol was followed      Last vitals:   Visit Vitals  BP (!) 141/75 (BP Location: Left arm, Patient Position: Sitting)   Pulse 85   Temp 36.5 °C (97.7 °F)   Resp 18   Ht 5' 8" (1.727 m)   Wt 114.3 kg (252 lb)   SpO2 97%   BMI 38.32 kg/m²     "

## 2022-12-16 NOTE — PROGRESS NOTES
"Physical Therapy Daily Treatment Note     Name: Coy Rosado Jr.  Clinic Number: [id number]    Therapy Diagnosis:   Encounter Diagnoses   Name Primary?    Gait, antalgic Yes    Acute pain of right knee        Physician: Shane Wills III, *    Visit Date: 2022    Physician Orders: PT Eval and Treat   Medical Diagnosis from Referral: M17.11 (ICD-10-CM) - Primary osteoarthritis of right knee  Evaluation Date: 2022  Authorization Period Expiration: 2022  Plan of Care Expiration: 2/15/2023   Progress Note Due: 2023  Visit # / Visits authorized: 5/ 15   Remaining Visits Scheduled - 10  PTA Consecutive Visits - 0/6    5th Visit FOTO - (Date, Score/ turn green)  10th Visit FOTO -  (Date, Score/ turn green )  D/C FOTO - (Date, Score/ turn green )    Time In: 11:00  Time Out: 1145  Billable Time: 45 minutes  Non-Billable Time: 00 minutes    Precautions: Standard and Fall  Insurance: Payor: [employer]ET[employer] / Plan: [employer] CHOICE POS / Product Type: Commercial /     Subjective     Pt reports: stiffness of the right knee ;   He is compliant with home exercise program.  Response to previous treatment: Session was good. "Good stretching"    Pre-Treatment Pain Ratin/10  Post-Treatment Pain Ratin/10  Location: right knee      Objective     Coy received therapeutic exercises to develop strength, endurance, ROM, and flexibility for 25 minutes including:    Warm-up      Table  Short Arc Quad 2x10 with a 3' sec hold (moderate assistance from Physical Therapist)  Heel Slides with Strap x30 with a 5' sec hold at end range    Seated        Standing    Standing Weight Shifts 2x10 with a 3' sec hold   TKE 3x8 with a 3' sec hold       *Bold exercise performed     Coy participated in neuromuscular re-education activities to improve: Balance, Coordination, Proprioception, Posture, and Neuromuscular Control for 10 minutes. The following activities were included:  Contract-Relax Technique 3x5 with a 3' sec hold (Quads) "   Quad Set 2x10 with a 3' sec hold  Heel Digs 2x10 with a 3' sec hold     manual therapy techniques: Joint mobilizations, Manual traction, Myofacial release, and Soft tissue Mobilization were applied to the: right knee for 00 minutes, including:  Passive range of motion with overpressure to the right knee in all planes   Grade 2 Mobilization of the Right Knee in all planes    Coy participated in gait training to improve functional mobility and safety for 00  minutes, including:    Coy Rosado Jr. ambulated 120 feet with rolling walker device with contact guard assistance assistance. Coy Rosado Jr. displays antalgic gait deviation with limited knee extension. Pt received verbal and tactile cues to improve gait deviations. Pt was Able to improve gait deviations with interventions. Cues given to focus on heel off and toe off to increase knee flexion during ambulation. Weight bearing on R leg was also increased. Decrease external rotation  of the hip     Coy participated in dynamic functional therapeutic activities to improve functional performance for 10  minutes, including:    Visit Number:  4 out of 15   Activities performed   (duration/resistance)     Stair Negotiation Training  Not Performed    Ambulate in the clinic without AD  3 laps                       Home Exercises Provided and Patient Education Provided     Education provided:   - Continue with HEP    Written Home Exercises Provided: Patient instructed to cont prior HEP.  Exercises were reviewed and Coy was able to demonstrate them prior to the end of the session.  Coy demonstrated good  understanding of the education provided.     See EMR under Patient Instructions for exercises provided prior visit.    Assessment     Patient currently presents to therapy with continued right knee pain and range of motion limitations secondary to post-op status. Patient display medial and lateral quadriceps tightness that was reduced with ISTAM to reduce  tightness. Patient was able to improve knee flexion range of motion to 85. Patient was also educated in regards to gait mechanics.     Coy Is progressing well towards His goals.     Pt prognosis is Good.     Pt will continue to benefit from skilled outpatient physical therapy to address the deficits listed in the problem list box on initial evaluation, provide pt/family education and to maximize pt's level of independence in the home and community environment.     Pt's spiritual, cultural and educational needs considered and pt agreeable to plan of care and goals.    Anticipated barriers to physical therapy: None    Goals:  Short Term Goals: 5 weeks   Patient will be independent with HEP at home to increase PT compliance.  Progressing 12/16/2022      Patient will be independent with 110° of knee flexion and -5° of knee extension to increase mobility Progressing 12/16/2022      Patient will be independent with sleeping for > 5 hours without waking up due to pain to increase sleep capacity  Progressing 12/16/2022               Long Term Goals: 10 weeks   Patient will decrease LEFS score to > 65/80 to increase functional capacity.  Progressing 12/16/2022     Patient will independently be able to perform 10 Sit to Stand with < 2/10 pain to the right knee to increase functional capacity.  Progressing 12/16/2022    Patient will be independent with ambulating for 700 yards with < 2/10 pain to increase community capacity.  Progressing 12/16/2022        Plan     Progress duration, workload, and resistance levels of the Therapeutic Activities and Exercises if the patient does not exhibit any pain, swelling, or other symptoms. Progress instruction in-home exercise progression and modification, including symptom management.    Jesus Alberto Turner, PT, DPT  12/16/2022

## 2022-12-19 ENCOUNTER — CLINICAL SUPPORT (OUTPATIENT)
Dept: REHABILITATION | Facility: HOSPITAL | Age: 52
End: 2022-12-19
Payer: COMMERCIAL

## 2022-12-19 DIAGNOSIS — M25.561 ACUTE PAIN OF RIGHT KNEE: ICD-10-CM

## 2022-12-19 DIAGNOSIS — R26.89 GAIT, ANTALGIC: Primary | ICD-10-CM

## 2022-12-19 PROCEDURE — 97110 THERAPEUTIC EXERCISES: CPT | Mod: PN

## 2022-12-19 PROCEDURE — 97112 NEUROMUSCULAR REEDUCATION: CPT | Mod: PN

## 2022-12-19 PROCEDURE — 97140 MANUAL THERAPY 1/> REGIONS: CPT | Mod: PN

## 2022-12-19 NOTE — PROGRESS NOTES
"Physical Therapy Daily Treatment Note     Name: Coy Rosado Jr.  Clinic Number: [id number]    Therapy Diagnosis:   Encounter Diagnoses   Name Primary?    Gait, antalgic Yes    Acute pain of right knee        Physician: Shane Wills III, *    Visit Date: 2022    Physician Orders: PT Eval and Treat   Medical Diagnosis from Referral: M17.11 (ICD-10-CM) - Primary osteoarthritis of right knee  Evaluation Date: 2022  Authorization Period Expiration: 2022  Plan of Care Expiration: 2/15/2023   Progress Note Due: 2023  Visit # / Visits authorized: 6/ 15   Remaining Visits Scheduled - 10  PTA Consecutive Visits - 0/6    5th Visit FOTO - (Date, Score/ turn green)  10th Visit FOTO -  (Date, Score/ turn green )  D/C FOTO - (Date, Score/ turn green )    Time In: 1430  Time Out: 1515  Billable Time: 45 minutes  Non-Billable Time: 00 minutes    Precautions: Standard and Fall  Insurance: Payor: AETNA / Plan: [employer] CHOICE POS / Product Type: Commercial /     Subjective     Pt reports: knee pain with standing   He is compliant with home exercise program.  Response to previous treatment: Session was good. "Good stretching"    Pre-Treatment Pain Ratin/10  Post-Treatment Pain Ratin/10  Location: right knee      Objective     Coy received therapeutic exercises to develop strength, endurance, ROM, and flexibility for 25 minutes including:    Warm-up      Table  Short Arc Quad 2x10 with a 3' sec hold (moderate assistance from Physical Therapist)  Heel Slides with Strap x30 with a 5' sec hold at end range    Seated        Standing    Standing Weight Shifts 2x10 with a 3' sec hold   TKE 3x8 with a 3' sec hold       *Bold exercise performed     Coy participated in neuromuscular re-education activities to improve: Balance, Coordination, Proprioception, Posture, and Neuromuscular Control for 10 minutes. The following activities were included:  Contract-Relax Technique 3x5 with a 3' sec hold (Quads)   Quad Set " 2x10 with a 3' sec hold  Heel Digs 2x10 with a 3' sec hold     manual therapy techniques: Joint mobilizations, Manual traction, Myofacial release, and Soft tissue Mobilization were applied to the: right knee for 10 minutes, including:  Passive range of motion with overpressure to the right knee in all planes   Grade 2 Mobilization of the Right Knee in all planes  IASTM - done     Coy participated in gait training to improve functional mobility and safety for 00  minutes, including:    Coy Rosado Jr. ambulated 120 feet with rolling walker device with contact guard assistance assistance. Coy Rosado Jr. displays antalgic gait deviation with limited knee extension. Pt received verbal and tactile cues to improve gait deviations. Pt was Able to improve gait deviations with interventions. Cues given to focus on heel off and toe off to increase knee flexion during ambulation. Weight bearing on R leg was also increased. Decrease external rotation  of the hip     Coy participated in dynamic functional therapeutic activities to improve functional performance for 10  minutes, including:    Visit Number:  6 out of 15   Activities performed   (duration/resistance)     Stair Negotiation Training  Not Performed    Ambulate in the clinic without AD  3 laps                       Home Exercises Provided and Patient Education Provided     Education provided:   - Continue with HEP    Written Home Exercises Provided: Patient instructed to cont prior HEP.  Exercises were reviewed and Coy was able to demonstrate them prior to the end of the session.  Coy demonstrated good  understanding of the education provided.     See EMR under Patient Instructions for exercises provided prior visit.    Assessment     Patient currently presents to therapy with continued right knee pain and range of motion limitations secondary to post-op status. Patient display medial and lateral quadriceps tightness that was reduced with ISTAM to reduce  tightness. Patient was able to improve knee flexion range of motion to 85. Patient was also educated in regards to gait mechanics.         Coy Is progressing well towards His goals.     Pt prognosis is Good.     Pt will continue to benefit from skilled outpatient physical therapy to address the deficits listed in the problem list box on initial evaluation, provide pt/family education and to maximize pt's level of independence in the home and community environment.     Pt's spiritual, cultural and educational needs considered and pt agreeable to plan of care and goals.    Anticipated barriers to physical therapy: None    Goals:  Short Term Goals: 5 weeks   Patient will be independent with HEP at home to increase PT compliance.  Progressing 12/19/2022      Patient will be independent with 110° of knee flexion and -5° of knee extension to increase mobility Progressing 12/19/2022      Patient will be independent with sleeping for > 5 hours without waking up due to pain to increase sleep capacity  Progressing 12/19/2022               Long Term Goals: 10 weeks   Patient will decrease LEFS score to > 65/80 to increase functional capacity.  Progressing 12/19/2022     Patient will independently be able to perform 10 Sit to Stand with < 2/10 pain to the right knee to increase functional capacity.  Progressing 12/19/2022    Patient will be independent with ambulating for 700 yards with < 2/10 pain to increase community capacity.  Progressing 12/19/2022        Plan     Progress duration, workload, and resistance levels of the Therapeutic Activities and Exercises if the patient does not exhibit any pain, swelling, or other symptoms. Progress instruction in-home exercise progression and modification, including symptom management.    Jesus Alberto Turner, PT, DPT  12/19/2022

## 2022-12-20 ENCOUNTER — OFFICE VISIT (OUTPATIENT)
Dept: ORTHOPEDICS | Facility: CLINIC | Age: 52
End: 2022-12-20
Payer: COMMERCIAL

## 2022-12-20 VITALS — HEIGHT: 68 IN | WEIGHT: 251.31 LBS | BODY MASS INDEX: 38.09 KG/M2

## 2022-12-20 DIAGNOSIS — Z96.651 S/P TOTAL KNEE REPLACEMENT USING CEMENT, RIGHT: Primary | ICD-10-CM

## 2022-12-20 PROCEDURE — 99999 PR PBB SHADOW E&M-EST. PATIENT-LVL III: ICD-10-PCS | Mod: PBBFAC,,, | Performed by: NURSE PRACTITIONER

## 2022-12-20 PROCEDURE — 99024 POSTOP FOLLOW-UP VISIT: CPT | Mod: S$GLB,,, | Performed by: NURSE PRACTITIONER

## 2022-12-20 PROCEDURE — 99999 PR PBB SHADOW E&M-EST. PATIENT-LVL III: CPT | Mod: PBBFAC,,, | Performed by: NURSE PRACTITIONER

## 2022-12-20 PROCEDURE — 99024 PR POST-OP FOLLOW-UP VISIT: ICD-10-PCS | Mod: S$GLB,,, | Performed by: NURSE PRACTITIONER

## 2022-12-20 RX ORDER — OXYCODONE HYDROCHLORIDE 5 MG/1
TABLET ORAL
Qty: 40 TABLET | Refills: 0 | Status: SHIPPED | OUTPATIENT
Start: 2022-12-20 | End: 2023-01-10 | Stop reason: SDUPTHER

## 2022-12-21 ENCOUNTER — CLINICAL SUPPORT (OUTPATIENT)
Dept: REHABILITATION | Facility: HOSPITAL | Age: 52
End: 2022-12-21
Payer: COMMERCIAL

## 2022-12-21 DIAGNOSIS — M25.561 ACUTE PAIN OF RIGHT KNEE: ICD-10-CM

## 2022-12-21 DIAGNOSIS — R26.89 GAIT, ANTALGIC: Primary | ICD-10-CM

## 2022-12-21 PROCEDURE — 97110 THERAPEUTIC EXERCISES: CPT | Mod: PN

## 2022-12-21 PROCEDURE — 97112 NEUROMUSCULAR REEDUCATION: CPT | Mod: PN

## 2022-12-21 NOTE — ANESTHESIA POSTPROCEDURE EVALUATION
Anesthesia Post Evaluation    Patient: Coy Rosado Jr.    Procedure(s) Performed: Procedure(s) (LRB):  ARTHROPLASTY, KNEE, TOTAL, USING COMPUTER-ASSISTED NAVIGATION: LURDES: RIGHT: MARTHA BARRETT (Right)    Final Anesthesia Type: CSE      Patient location during evaluation: PACU  Patient participation: Yes- Able to Participate  Level of consciousness: awake and alert and oriented  Post-procedure vital signs: reviewed and stable  Pain management: adequate  Airway patency: patent    PONV status at discharge: No PONV  Anesthetic complications: no      Cardiovascular status: hemodynamically stable  Respiratory status: unassisted, spontaneous ventilation and room air  Hydration status: euvolemic  Follow-up not needed.          Vitals Value Taken Time   /75 12/06/22 1037   Temp 36.5 °C (97.7 °F) 12/06/22 1037   Pulse 85 12/06/22 1037   Resp 18 12/06/22 1037   SpO2 97 % 12/06/22 1037         Event Time   Out of Recovery 12/05/2022 17:00:00         Pain/Qian Score: No data recorded

## 2022-12-21 NOTE — PROGRESS NOTES
"Physical Therapy Daily Treatment Note     Name: Coy Rosado Jr.  Clinic Number: [id number]    Therapy Diagnosis:   Encounter Diagnoses   Name Primary?    Gait, antalgic Yes    Acute pain of right knee        Physician: Shane Wills III, *    Visit Date: 2022    Physician Orders: PT Eval and Treat   Medical Diagnosis from Referral: M17.11 (ICD-10-CM) - Primary osteoarthritis of right knee  Evaluation Date: 2022  Authorization Period Expiration: 2022  Plan of Care Expiration: 2/15/2023   Progress Note Due: 2023  Visit # / Visits authorized: 7/ 15   Remaining Visits Scheduled - 10  PTA Consecutive Visits - 0/6    5th Visit FOTO - (Date, Score/ turn green)  10th Visit FOTO -  (Date, Score/ turn green )  D/C FOTO - (Date, Score/ turn green )    Time In: 11:15  Time Out: 1150  Billable Time: 35 minutes  Non-Billable Time: 00 minutes    Precautions: Standard and Fall  Insurance: Payor: AETNA / Plan: AET[employer] CHOICE POS / Product Type: Commercial /     Subjective     Pt reports: going to his provider yesterday and seeing the PA. Bandages were taken out and everything is looking good.   He is compliant with home exercise program.  Response to previous treatment: Session was good. "Good stretching"    Pre-Treatment Pain Ratin/10  Post-Treatment Pain Ratin/10  Location: right knee      Objective     Coy received therapeutic exercises to develop strength, endurance, ROM, and flexibility for 20 minutes including:    Warm-up      Table  Short Arc Quad 2x10 with a 3' sec hold (moderate assistance from Physical Therapist)  Heel Slides with Strap x30 with a 5' sec hold at end range  Bridges 3x10     Seated    Long Arc Quad 3x10, No Resistance   Seated Hamstring Stretch 2x30 sec     Standing    Standing Weight Shifts 2x10 with a 3' sec hold   TKE without Ball 3x8 with a 3' sec hold       *Bold exercise performed     Coy participated in neuromuscular re-education activities to improve: Balance, " Coordination, Proprioception, Posture, and Neuromuscular Control for 10 minutes. The following activities were included:  Contract-Relax Technique 3x5 with a 3' sec hold (Quads)   Quad Set 2x10 with a 3' sec hold  Heel Digs 2x10 with a 3' sec hold   Tandem Balance on Airex 2x30 sec, Eyes Open + headturns     manual therapy techniques: Joint mobilizations, Manual traction, Myofacial release, and Soft tissue Mobilization were applied to the: right knee for 00 minutes, including:  Passive range of motion with overpressure to the right knee in all planes   Grade 2 Mobilization of the Right Knee in all planes    Coy participated in gait training to improve functional mobility and safety for 00  minutes, including:    Coy Rosado Jr. ambulated 120 feet with rolling walker device with contact guard assistance assistance. Coy Rosado Jr. displays antalgic gait deviation with limited knee extension. Pt received verbal and tactile cues to improve gait deviations. Pt was Able to improve gait deviations with interventions. Cues given to focus on heel off and toe off to increase knee flexion during ambulation. Weight bearing on R leg was also increased. Decrease external rotation  of the hip     Coy participated in dynamic functional therapeutic activities to improve functional performance for 5  minutes, including:    Visit Number:  7 out of 15   Activities performed   (duration/resistance)     Stair Negotiation Training  Not Performed    Ambulate in the clinic without AD  3 laps                     Home Exercises Provided and Patient Education Provided     Education provided:   - Continue with HEP    Written Home Exercises Provided: Patient instructed to cont prior HEP.  Exercises were reviewed and Coy was able to demonstrate them prior to the end of the session.  Coy demonstrated good  understanding of the education provided.     See EMR under Patient Instructions for exercises provided prior visit.    Assessment      Patient completed the exercise program with moderate levels of discomfort in the knees. Introduction of weight-bearing exercises was well tolerated today and increased standing capacity. A decrease in knee flexion and heel strike was noted when ambulating without using the AD and was corrected by slowing the patient down. Patient would continue to benefit from skilled Physical Therapy to decrease pain, increase knee mobility, and discontinue the walker.     Coy Is progressing well towards His goals.   Pt prognosis is Good.     Pt will continue to benefit from skilled outpatient physical therapy to address the deficits listed in the problem list box on initial evaluation, provide pt/family education and to maximize pt's level of independence in the home and community environment.     Pt's spiritual, cultural and educational needs considered and pt agreeable to plan of care and goals.    Anticipated barriers to physical therapy: None    Goals:  Short Term Goals: 5 weeks   Patient will be independent with HEP at home to increase PT compliance.  Progressing 12/21/2022      Patient will be independent with 110° of knee flexion and -5° of knee extension to increase mobility Progressing 12/21/2022      Patient will be independent with sleeping for > 5 hours without waking up due to pain to increase sleep capacity  Progressing 12/21/2022               Long Term Goals: 10 weeks   Patient will decrease LEFS score to > 65/80 to increase functional capacity.  Progressing 12/21/2022     Patient will independently be able to perform 10 Sit to Stand with < 2/10 pain to the right knee to increase functional capacity.  Progressing 12/21/2022    Patient will be independent with ambulating for 700 yards with < 2/10 pain to increase community capacity.  Progressing 12/21/2022        Plan     Progress duration, workload, and resistance levels of the Therapeutic Activities and Exercises if the patient does not exhibit any pain,  swelling, or other symptoms. Progress instruction in-home exercise progression and modification, including symptom management.    Shay Leone, PT, DPT  12/21/2022

## 2022-12-21 NOTE — PROGRESS NOTES
"Coy Rosado Jr. presents for initial post-operative visit following a right total knee arthroplasty performed by Dr. Chavez on 12/5/2022.      Exam:   Height 5' 8" (1.727 m), weight 114 kg (251 lb 4.8 oz).   Ambulating well with assistive device.  Incision is clean and dry without drainage or erythema.   ROM:0-90    Initial post-operative radiographs reviewed today revealing a well fixed and aligned prosthesis.    A/P:  2 weeks s/p right total knee replacement  - The patient was advised to keep the incision clean and dry for the next 24 hours after which he may wash the area with antibacterial soap in the shower. Will not submerge until the incision is completely healed.   - Outpatient PT ongoing at the [facility]  - Continue aspirin for 1 month post op  - Pain medication refilled   - Follow up in 4 weeks with new x-rays. Pt will call clinic with problems/concerns.     "

## 2022-12-21 NOTE — ANESTHESIA PREPROCEDURE EVALUATION
12/20/2022  Coy Rosado Jr. is a 52 y.o., male.      Pre-op Assessment    I have reviewed the Patient Summary Reports.     I have reviewed the Nursing Notes. I have reviewed the NPO Status.   I have reviewed the Medications.     Review of Systems  Anesthesia Hx:  No problems with previous Anesthesia  History of prior surgery of interest to airway management or planning: Previous anesthesia: General Denies Family Hx of Anesthesia complications.   Denies Personal Hx of Anesthesia complications.   Social:  Non-Smoker    Hematology/Oncology:  Hematology Normal   Oncology Normal     EENT/Dental:EENT/Dental Normal   Cardiovascular:   Exercise tolerance: good Hypertension    Pulmonary:   Sleep Apnea    Renal/:  Renal/ Normal     Hepatic/GI:  Hepatic/GI Normal    Musculoskeletal:   Arthritis   Spine Disorders: Chronic Pain    Neurological:  Neurology Normal    Endocrine:   Diabetes, type 2  Morbid Obesity / BMI > 40  Dermatological:  Skin Normal    Psych:  Psychiatric Normal           Physical Exam  General: Well nourished, Cooperative, Alert and Oriented    Airway:  Mallampati: III / II  Mouth Opening: Normal  TM Distance: Normal  Tongue: Normal  Neck ROM: Normal ROM    Dental:  Intact    Chest/Lungs:  Clear to auscultation, Normal Respiratory Rate    Heart:  Rate: Normal  Rhythm: Regular Rhythm  Sounds: Normal    Abdomen:  Normal, Soft, Nontender        Anesthesia Plan  Type of Anesthesia, risks & benefits discussed:    Anesthesia Type: CSE  Intra-op Monitoring Plan: Standard ASA Monitors  Post Op Pain Control Plan: multimodal analgesia  Informed Consent: Informed consent signed with the Patient and all parties understand the risks and agree with anesthesia plan.  All questions answered.   ASA Score: 2    Ready For Surgery From Anesthesia Perspective.     .

## 2022-12-23 ENCOUNTER — CLINICAL SUPPORT (OUTPATIENT)
Dept: REHABILITATION | Facility: HOSPITAL | Age: 52
End: 2022-12-23
Payer: COMMERCIAL

## 2022-12-23 DIAGNOSIS — R26.89 GAIT, ANTALGIC: Primary | ICD-10-CM

## 2022-12-23 DIAGNOSIS — M25.561 ACUTE PAIN OF RIGHT KNEE: ICD-10-CM

## 2022-12-23 PROCEDURE — 97530 THERAPEUTIC ACTIVITIES: CPT | Mod: PN

## 2022-12-23 PROCEDURE — 97112 NEUROMUSCULAR REEDUCATION: CPT | Mod: PN

## 2022-12-23 PROCEDURE — 97110 THERAPEUTIC EXERCISES: CPT | Mod: PN

## 2022-12-23 NOTE — PROGRESS NOTES
"Physical Therapy Daily Treatment Note     Name: Coy Rosado Jr.  Clinic Number: [id number]    Therapy Diagnosis:   Encounter Diagnoses   Name Primary?    Gait, antalgic Yes    Acute pain of right knee      Physician: Shane Wills III, *    Visit Date: 2022    Physician Orders: PT Eval and Treat   Medical Diagnosis from Referral: M17.11 (ICD-10-CM) - Primary osteoarthritis of right knee  Evaluation Date: 2022  Authorization Period Expiration: 2022  Plan of Care Expiration: 2/15/2023   Progress Note Due: 2023  Visit # / Visits authorized: 8/ 15   Remaining Visits Scheduled - 9  PTA Consecutive Visits - 0/6    5th Visit FOTO - (Date, Score/ turn green)  10th Visit FOTO -  (Date, Score/ turn green )  D/C FOTO - (Date, Score/ turn green )    Time In: 9:30  Time Out: 10:20  Billable Time: 50 minutes  Non-Billable Time: 00 minutes    Precautions: Standard and Fall  Insurance: Payor: AETNA / Plan: AETNA CHOICE POS / Product Type: Commercial /     Subjective     Pt reports: slight stiffness today to the right knee with some pain.   He is compliant with home exercise program.  Response to previous treatment: Session was good. "Good stretching"    Pre-Treatment Pain Ratin/10  Post-Treatment Pain Ratin/10  Location: right knee      Objective     Coy received therapeutic exercises to develop strength, endurance, ROM, and flexibility for 15 minutes including:    Warm-up      Table  Short Arc Quad 2x10 with a 3' sec hold (moderate assistance from Physical Therapist)  Heel Slides with Strap x30 with a 5' sec hold at end range  Bridges 3x10     Seated    Long Arc Quad 3x10, Red Theraband   Seated Hamstring Stretch 2x30 sec     Standing    Standing Weight Shifts 2x10 with a 3' sec hold   Hamstring Curls 2x12, Red Theraband         *Bold exercise performed     Coy participated in neuromuscular re-education activities to improve: Balance, Coordination, Proprioception, Posture, and Neuromuscular " "Control for 10 minutes. The following activities were included:  Contract-Relax Technique 3x5 with a 3' sec hold (Quads)   Quad Set 2x10 with a 3' sec hold  Heel Digs 2x10 with a 3' sec hold   Tandem Balance on Airex 2x30 sec, Eyes Open + headturns / Eyes Closed   TKE without Ball 3x8 with a 3' sec hold     manual therapy techniques: Joint mobilizations, Manual traction, Myofacial release, and Soft tissue Mobilization were applied to the: right knee for 00 minutes, including:  Passive range of motion with overpressure to the right knee in all planes   Grade 2 Mobilization of the Right Knee in all planes    Coy participated in gait training to improve functional mobility and safety for 00  minutes, including:    Coy Rosado Jr. ambulated 120 feet with rolling walker device with contact guard assistance assistance. Coy Rosado Jr. displays antalgic gait deviation with limited knee extension. Pt received verbal and tactile cues to improve gait deviations. Pt was Able to improve gait deviations with interventions. Cues given to focus on heel off and toe off to increase knee flexion during ambulation. Weight bearing on R leg was also increased. Decrease external rotation  of the hip     Coy participated in dynamic functional therapeutic activities to improve functional performance for 25  minutes, including:    Visit Number:  8 out of 15   Activities performed   (duration/resistance)     Stair Negotiation Training  Not Performed    Ambulate in the clinic without AD  5 laps    Step Up/Down on 4" box  2x10    Lateral Step Down on 4" box  3x8    Sit to Stand  3x10                Home Exercises Provided and Patient Education Provided     Education provided:   - Continue with HEP    Written Home Exercises Provided: Patient instructed to cont prior HEP.  Exercises were reviewed and Coy was able to demonstrate them prior to the end of the session.  Coy demonstrated good  understanding of the education provided.     See " EMR under Patient Instructions for exercises provided prior visit.    Assessment     Patient completed the exercise program with mild levels of pain. New exercises focused on weight-bearing capacity and shifting due to favoring his Left lower extremity. Frequent reminders were needed with the patient due to deviating away from the right lower extremity to decrease pressure. No modifications were required. Mr. Rosado could ambulate the clinic without using the walker for 5 laps with minor gait deviations. A decrease in the heel-to-toe strike was noted and was verbally cued to correct. Patient would continue to benefit from skilled Physical Therapy to improve walking mechanics and weight-bearing status.     Coy Is progressing well towards His goals.   Pt prognosis is Good.     Pt will continue to benefit from skilled outpatient physical therapy to address the deficits listed in the problem list box on initial evaluation, provide pt/family education and to maximize pt's level of independence in the home and community environment.     Pt's spiritual, cultural and educational needs considered and pt agreeable to plan of care and goals.    Anticipated barriers to physical therapy: None    Goals:  Short Term Goals: 5 weeks   Patient will be independent with HEP at home to increase PT compliance.  Progressing 12/23/2022      Patient will be independent with 110° of knee flexion and -5° of knee extension to increase mobility Progressing 12/23/2022      Patient will be independent with sleeping for > 5 hours without waking up due to pain to increase sleep capacity  Progressing 12/23/2022               Long Term Goals: 10 weeks   Patient will decrease LEFS score to > 65/80 to increase functional capacity.  Progressing 12/23/2022     Patient will independently be able to perform 10 Sit to Stand with < 2/10 pain to the right knee to increase functional capacity.  Progressing 12/23/2022    Patient will be independent with  ambulating for 700 yards with < 2/10 pain to increase community capacity.  Progressing 12/23/2022        Plan     Progress duration, workload, and resistance levels of the Therapeutic Activities and Exercises if the patient does not exhibit any pain, swelling, or other symptoms. Progress instruction in-home exercise progression and modification, including symptom management.    Shay Leone, PT, DPT  12/23/2022

## 2022-12-28 ENCOUNTER — CLINICAL SUPPORT (OUTPATIENT)
Dept: REHABILITATION | Facility: HOSPITAL | Age: 52
End: 2022-12-28
Payer: COMMERCIAL

## 2022-12-28 DIAGNOSIS — M25.561 ACUTE PAIN OF RIGHT KNEE: ICD-10-CM

## 2022-12-28 DIAGNOSIS — R26.89 GAIT, ANTALGIC: Primary | ICD-10-CM

## 2022-12-28 PROCEDURE — 97110 THERAPEUTIC EXERCISES: CPT | Mod: PN

## 2022-12-28 NOTE — PLAN OF CARE
[facility]  Discharge Final Note    Primary Care Provider: Shorty Weiss MD    Expected Discharge Date: 12/6/2022    Final Discharge Note (most recent)       Final Note - 12/06/22 1121          Final Note    Assessment Type Final Discharge Note     Anticipated Discharge Disposition Home or Self Care     Hospital Resources/Appts/Education Provided Provided patient/caregiver with written discharge plan information;Appointments scheduled by Navigator/Coordinator

## 2022-12-29 ENCOUNTER — PATIENT MESSAGE (OUTPATIENT)
Dept: ADMINISTRATIVE | Facility: HOSPITAL | Age: 52
End: 2022-12-29
Payer: COMMERCIAL

## 2022-12-29 ENCOUNTER — TELEPHONE (OUTPATIENT)
Dept: INTERNAL MEDICINE | Facility: CLINIC | Age: 52
End: 2022-12-29
Payer: COMMERCIAL

## 2022-12-29 ENCOUNTER — PATIENT OUTREACH (OUTPATIENT)
Dept: ADMINISTRATIVE | Facility: HOSPITAL | Age: 52
End: 2022-12-29
Payer: COMMERCIAL

## 2022-12-29 VITALS — DIASTOLIC BLOOD PRESSURE: 62 MMHG | SYSTOLIC BLOOD PRESSURE: 125 MMHG

## 2022-12-29 NOTE — PROGRESS NOTES
Health Maintenance Due   Topic Date Due    Hepatitis C Screening  Never done    Lipid Panel  Never done    Colorectal Cancer Screening  Never done    Shingles Vaccine (1 of 2) Never done    COVID-19 Vaccine (4 - Booster for Moderna series) 03/04/2022    Influenza Vaccine (1) 09/01/2022        Updated. Outreached patient regarding overdue PCP visit and labs.    Kristi Mckeon LPN   Clinical Care Coordinator  Primary Care and Wellness

## 2022-12-30 ENCOUNTER — PATIENT OUTREACH (OUTPATIENT)
Dept: ADMINISTRATIVE | Facility: HOSPITAL | Age: 52
End: 2022-12-30
Payer: COMMERCIAL

## 2022-12-30 ENCOUNTER — CLINICAL SUPPORT (OUTPATIENT)
Dept: REHABILITATION | Facility: HOSPITAL | Age: 52
End: 2022-12-30
Payer: COMMERCIAL

## 2022-12-30 DIAGNOSIS — R26.89 GAIT, ANTALGIC: Primary | ICD-10-CM

## 2022-12-30 DIAGNOSIS — M25.561 ACUTE PAIN OF RIGHT KNEE: ICD-10-CM

## 2022-12-30 PROCEDURE — 97140 MANUAL THERAPY 1/> REGIONS: CPT | Mod: PN

## 2022-12-30 PROCEDURE — 97110 THERAPEUTIC EXERCISES: CPT | Mod: PN

## 2022-12-30 PROCEDURE — 97112 NEUROMUSCULAR REEDUCATION: CPT | Mod: PN

## 2022-12-30 NOTE — PROGRESS NOTES
"Physical Therapy Daily Treatment Note     Name: Coy Rosado Jr.  Clinic Number: [id number]    Therapy Diagnosis:   Encounter Diagnoses   Name Primary?    Gait, antalgic Yes    Acute pain of right knee      Physician: Shane Wills III, *    Visit Date: 2022    Physician Orders: PT Eval and Treat   Medical Diagnosis from Referral: M17.11 (ICD-10-CM) - Primary osteoarthritis of right knee  Evaluation Date: 2022  Authorization Period Expiration: 2022  Plan of Care Expiration: 2/15/2023   Progress Note Due: 2023  Visit # / Visits authorized: 10/ 15   Remaining Visits Scheduled - 9  PTA Consecutive Visits - 0/6    5th Visit FOTO - (Date, Score/ turn green)  10th Visit FOTO -  (Date, Score/ turn green )  D/C FOTO - (Date, Score/ turn green )    Time In: 1230  Time Out: 1330  Billable Time: 60 minutes  Non-Billable Time: 00 minutes    Precautions: Standard and Fall  Insurance: Payor: AETNA / Plan: AETNA CHOICE POS / Product Type: Commercial /     Subjective     Pt reports: slight stiffness ; Patient continues to ambulate with lack of knee flexion during inital swing  He is compliant with home exercise program.  Response to previous treatment: Session was good. "Good stretching"    Pre-Treatment Pain Ratin/10  Post-Treatment Pain Ratin/10  Location: right knee      Objective     Coy received therapeutic exercises to develop strength, endurance, ROM, and flexibility for 25 minutes including:    Warm-up      Table  Short Arc Quad 2x10 with a 3' sec hold (moderate assistance from Physical Therapist)  Heel Slides with Strap x30 with a 5' sec hold at end range  Bridges 3x10     Seated    Long Arc Quad 3x10, with ball   Seated Hamstring Stretch 2x30 sec     Standing    Standing Weight Shifts 2x10 with a 3' sec hold   Hamstring Curls 2x12, Red Theraband   Mini squats - 30 repititions    Heel raises - 30 repititions    GASTROC STRETCH - 3'            *Bold exercise performed     Coy participated in " "neuromuscular re-education activities to improve: Balance, Coordination, Proprioception, Posture, and Neuromuscular Control for 10 minutes. The following activities were included:  Contract-Relax Technique 3x5 with a 3' sec hold (Quads)   Quad Set 2x10 with a 3' sec hold  Heel Digs 2x10 with a 3' sec hold   Tandem Balance on Airex 2x30 sec, Eyes Open + headturns / Eyes Closed   TKE without Ball 3x8 with a 3' sec hold     manual therapy techniques: Joint mobilizations, Manual traction, Myofacial release, and Soft tissue Mobilization were applied to the: right knee for 25 minutes, including:  Passive range of motion with overpressure to the right knee in all planes   Grade 2 Mobilization of the Right Knee in all planes  Scar jack     Coy participated in gait training to improve functional mobility and safety for 00  minutes, including:    Coy Rosado Jr. ambulated 120 feet with rolling walker device with contact guard assistance assistance. Coy Rosado Jr. displays antalgic gait deviation with limited knee extension. Pt received verbal and tactile cues to improve gait deviations. Pt was Able to improve gait deviations with interventions. Cues given to focus on heel off and toe off to increase knee flexion during ambulation. Weight bearing on R leg was also increased. Decrease external rotation  of the hip     Coy participated in dynamic functional therapeutic activities to improve functional performance for 25  minutes, including:    Visit Number:  8 out of 15   Activities performed   (duration/resistance)     Stair Negotiation Training  Not Performed    Ambulate in the clinic without AD  5 laps    Step Up/Down on 4" box  2x10    Lateral Step Down on 4" box  3x8    Sit to Stand  3x10                Home Exercises Provided and Patient Education Provided     Education provided:   - Continue with HEP    Written Home Exercises Provided: Patient instructed to cont prior HEP.  Exercises were reviewed and Coy was " able to demonstrate them prior to the end of the session.  Coy demonstrated good  understanding of the education provided.     See EMR under Patient Instructions for exercises provided prior visit.    Assessment       Patient currently presents to therapy with display of lack of knee flexion during ambulation period. Patient educated in regards to importance of correct lower extremity positioning and reduction of steppage gate. Patient tolerated an increase in therapeutic exercise with standing activity. Patient was able to improve lower extremity range of motion to 108 degrees of knee flexion and reduction of pain following scar tissue massage. Patient would continue to benefit from skilled Physical Therapy to improve walking mechanics and weight-bearing status.     Coy Is progressing well towards His goals.   Pt prognosis is Good.     Pt will continue to benefit from skilled outpatient physical therapy to address the deficits listed in the problem list box on initial evaluation, provide pt/family education and to maximize pt's level of independence in the home and community environment.     Pt's spiritual, cultural and educational needs considered and pt agreeable to plan of care and goals.    Anticipated barriers to physical therapy: None    Goals:  Short Term Goals: 5 weeks   Patient will be independent with HEP at home to increase PT compliance.  Progressing 12/30/2022      Patient will be independent with 110° of knee flexion and -5° of knee extension to increase mobility Progressing 12/30/2022      Patient will be independent with sleeping for > 5 hours without waking up due to pain to increase sleep capacity  Progressing 12/30/2022               Long Term Goals: 10 weeks   Patient will decrease LEFS score to > 65/80 to increase functional capacity.  Progressing 12/30/2022     Patient will independently be able to perform 10 Sit to Stand with < 2/10 pain to the right knee to increase functional capacity.   Progressing 12/30/2022    Patient will be independent with ambulating for 700 yards with < 2/10 pain to increase community capacity.  Progressing 12/30/2022        Plan     Progress duration, workload, and resistance levels of the Therapeutic Activities and Exercises if the patient does not exhibit any pain, swelling, or other symptoms. Progress instruction in-home exercise progression and modification, including symptom management.    Jesus Alberto Turner, PT, DPT  12/30/2022

## 2022-12-31 NOTE — PROGRESS NOTES
"Physical Therapy Daily Treatment Note     Name: Coy Rosado Jr.  Clinic Number: [id number]    Therapy Diagnosis:   Encounter Diagnoses   Name Primary?    Gait, antalgic Yes    Acute pain of right knee      Physician: Shane Wills III, *    Visit Date: 2022    Physician Orders: PT Eval and Treat   Medical Diagnosis from Referral: M17.11 (ICD-10-CM) - Primary osteoarthritis of right knee  Evaluation Date: 2022  Authorization Period Expiration: 2022  Plan of Care Expiration: 2/15/2023   Progress Note Due: 2023  Visit # / Visits authorized: 10/ 15   Remaining Visits Scheduled - 9  PTA Consecutive Visits - 0/6    5th Visit FOTO - (Date, Score/ turn green)  10th Visit FOTO -  (Date, Score/ turn green )  D/C FOTO - (Date, Score/ turn green )    Time In: 12:45 PM  Time Out: 1:30 PM  Billable Time: 38 minutes  Non-Billable Time: 00 minutes    Precautions: Standard and Fall  Insurance: Payor: AETNA / Plan: [employer] CHOICE POS / Product Type: Commercial /     Subjective     Pt reports: Pt states that overall the (R) knee is doing good and reports minimal pain in the knee.  He is compliant with home exercise program.  Response to previous treatment: Session was good. "Good stretching"    Pre-Treatment Pain Ratin/10  Post-Treatment Pain Rating: 3/10  Location: right knee      Objective     Coy received therapeutic exercises to develop strength, endurance, ROM, and flexibility for 30 minutes including:      Toe Raises 3 x 10 reps  Gastroc St.  3 x 30 sec hold  (R) HSS with Cord 3 x 30   Mini Squats with Ball 3 x 10 reps  PROM (R) Knee Flexion: 5 min 20/10  (R) heel slides 3 x 10 reps   (R) LAQ 3 x 10 reps  (R) SAQ 3 x 10 reps   (R) Step Up and Downs 3 x 10 reps 3 inch  (R) Knee Ext st. 5 min 3 lb overpressure  (R) quad set 3 x 10 reps  (R) SLR 3 x 10 reps  (R) sidelying hip abduction 3 x 10 reps BW    Home Exercises Provided and Patient Education Provided     Education provided:   - Continue with " HEP    Written Home Exercises Provided: Patient instructed to cont prior HEP.  Exercises were reviewed and Coy was able to demonstrate them prior to the end of the session.  Coy demonstrated good  understanding of the education provided.     See EMR under Patient Instructions for exercises provided prior visit.    Assessment     Pt remains with limited PROM flexion of the involved knee achieving 93 deg with min to mod overpreessure;  he remains with limited quadriceps / LE strengh and cannot tolerate a 1 lb weight during SLR without performing a exteensor lag;  he was encouraged to perform continued knee flexion streching at home.    Coy Is progressing well towards His goals.   Pt prognosis is Good.     Pt will continue to benefit from skilled outpatient physical therapy to address the deficits listed in the problem list box on initial evaluation, provide pt/family education and to maximize pt's level of independence in the home and community environment.     Pt's spiritual, cultural and educational needs considered and pt agreeable to plan of care and goals.    Anticipated barriers to physical therapy: None    Goals:  Short Term Goals: 5 weeks   Patient will be independent with HEP at home to increase PT compliance.  Progressing 12/28/2022      Patient will be independent with 110° of knee flexion and -5° of knee extension to increase mobility Progressing 12/28/2022      Patient will be independent with sleeping for > 5 hours without waking up due to pain to increase sleep capacity  Progressing 12/28/2022               Long Term Goals: 10 weeks   Patient will decrease LEFS score to > 65/80 to increase functional capacity.  Progressing 12/31/2022     Patient will independently be able to perform 10 Sit to Stand with < 2/10 pain to the right knee to increase functional capacity.  Progressing 12/31/2022    Patient will be independent with ambulating for 700 yards with < 2/10 pain to increase community capacity.   Progressing 12/31/2022        Plan     Progress duration, workload, and resistance levels of the Therapeutic Activities and Exercises if the patient does not exhibit any pain, swelling, or other symptoms. Progress instruction in-home exercise progression and modification, including symptom management.    Dk Mosqueda, PT, DPT  12/31/2022

## 2023-01-03 ENCOUNTER — DOCUMENTATION ONLY (OUTPATIENT)
Dept: REHABILITATION | Facility: HOSPITAL | Age: 53
End: 2023-01-03
Payer: COMMERCIAL

## 2023-01-03 ENCOUNTER — CLINICAL SUPPORT (OUTPATIENT)
Dept: REHABILITATION | Facility: HOSPITAL | Age: 53
End: 2023-01-03
Payer: COMMERCIAL

## 2023-01-03 DIAGNOSIS — M25.561 ACUTE PAIN OF RIGHT KNEE: ICD-10-CM

## 2023-01-03 DIAGNOSIS — R26.89 GAIT, ANTALGIC: Primary | ICD-10-CM

## 2023-01-03 PROCEDURE — 97112 NEUROMUSCULAR REEDUCATION: CPT | Mod: PN,CQ

## 2023-01-03 PROCEDURE — 97110 THERAPEUTIC EXERCISES: CPT | Mod: PN,CQ

## 2023-01-03 PROCEDURE — 97530 THERAPEUTIC ACTIVITIES: CPT | Mod: PN,CQ

## 2023-01-03 NOTE — PROGRESS NOTES
"Physical Therapy Daily Treatment Note     Name: Coy Rosado Jr.  Clinic Number: [id number]    Therapy Diagnosis:   Encounter Diagnoses   Name Primary?    Gait, antalgic Yes    Acute pain of right knee      Physician: Shane Wills III, *    Visit Date: 1/3/2023    Physician Orders: PT Eval and Treat   Medical Diagnosis from Referral: M17.11 (ICD-10-CM) - Primary osteoarthritis of right knee  Evaluation Date: 12/7/2022  Authorization Period Expiration: 12/31/2022  Plan of Care Expiration: 2/15/2023   Progress Note Due: 1/7/2023  Visit # / Visits authorized: 1/20  Remaining Visits Scheduled - 5  PTA Consecutive Visits - 1/6    12th visit - 1/3/2022 - 69.43  10th Visit FOTO -  (Date, Score/ turn green )  D/C FOTO - (Date, Score/ turn green )    Time In: 1:00 pm  Time Out: 1:45 pm  Billable Time: 45 minutes  Non-Billable Time: 00 minutes    Precautions: Standard and Fall  Insurance: Payor: AETNA / Plan: [employer] CHOICE POS / Product Type: Commercial /     Subjective     Pt reports: Presented to therapy with reduced pain in knee but needed pain medication when he woke up.   He is compliant with home exercise program.  Response to previous treatment: Session was good. "Good stretching"    Pre-Treatment Pain Rating: 3/10  Post-Treatment Pain Rating: 3/10  Location: right knee      Objective     Coy received therapeutic exercises to develop strength, endurance, ROM, and flexibility for 15 minutes including:    Warm-up      Table  Short Arc Quad 2x10 with a 3' sec hold (moderate assistance from Physical Therapist)  Heel Slides with Strap x30 with a 5' sec hold at end range  Bridges 3x10     Seated    Long Arc Quad #3 3x10,   Long Arc Quad with ball 3x10  Seated Hamstring Stretch 2x30 sec     Standing    Standing Weight Shifts 2x10 with a 3' sec hold   Hamstring Curls 2x12, Blue Theraband   Mini squats - 30 repetitions    Heel raises - 30 repetitions    GASTROC STRETCH - 3'         *Bold exercise performed     Coy " "participated in neuromuscular re-education activities to improve: Balance, Coordination, Proprioception, Posture, and Neuromuscular Control for 15 minutes. The following activities were included:  Contract-Relax Technique 3x5 with a 3' sec hold (Quads)   Quad Set 2x10 with a 3' sec hold  Heel Digs 2x10 with a 3' sec hold   Tandem Balance on Airex 2x30 sec, Eyes Open + headturns / Eyes Closed   TKE without Ball 3x8 with a 3' sec hold    Standing marches #3 - 10x3    manual therapy techniques: Joint mobilizations, Manual traction, Myofacial release, and Soft tissue Mobilization were applied to the: right knee for 00 minutes, including:  Passive range of motion with overpressure to the right knee in all planes   Grade 2 Mobilization of the Right Knee in all planes  Scar jack Cespedes participated in gait training to improve functional mobility and safety for 00  minutes, including:    Coy Rosado Jr. ambulated 120 feet with rolling walker device with contact guard assistance assistance. Coy Rosado Jr. displays antalgic gait deviation with limited knee extension. Pt received verbal and tactile cues to improve gait deviations. Pt was Able to improve gait deviations with interventions. Cues given to focus on heel off and toe off to increase knee flexion during ambulation. Weight bearing on R leg was also increased. Decrease external rotation  of the hip     Coy participated in dynamic functional therapeutic activities to improve functional performance for 15  minutes, including:    Visit Number:  8 out of 15   Activities performed   (duration/resistance)     Stair Negotiation Training  Not Performed    Ambulate in the clinic without AD 3# ankle weight 5 laps    Step Up/Down on 4" box  2x10 - NP   Lateral Step Down on 4" box  3x8 - NP   Sit to Stand  3x10 - Done                Home Exercises Provided and Patient Education Provided     Education provided:   - Continue with HEP    Written Home Exercises Provided: " Patient instructed to cont prior HEP.  Exercises were reviewed and Coy was able to demonstrate them prior to the end of the session.  Coy demonstrated good  understanding of the education provided.     See EMR under Patient Instructions for exercises provided prior visit.    Assessment     Pt tolerated session well. Poor eccentric control with sit to stands noted, upper Extremity support needed. Rest break needed after sit to stands due to fatigue. Standing marches with weight added to improve hip flexor strength. Cane height adjusted to proper length, weight left on foot during ambulation to improve R leg proprioception. Coy had no adverse effects with exercises    Coy Is progressing well towards His goals.   Pt prognosis is Good.     Pt will continue to benefit from skilled outpatient physical therapy to address the deficits listed in the problem list box on initial evaluation, provide pt/family education and to maximize pt's level of independence in the home and community environment.     Pt's spiritual, cultural and educational needs considered and pt agreeable to plan of care and goals.    Anticipated barriers to physical therapy: None    Goals:  Short Term Goals: 5 weeks   Patient will be independent with HEP at home to increase PT compliance.  Progressing 1/3/2023      Patient will be independent with 110° of knee flexion and -5° of knee extension to increase mobility Progressing 1/3/2023      Patient will be independent with sleeping for > 5 hours without waking up due to pain to increase sleep capacity  Progressing 1/3/2023               Long Term Goals: 10 weeks   Patient will decrease LEFS score to > 65/80 to increase functional capacity.  Progressing 1/3/2023     Patient will independently be able to perform 10 Sit to Stand with < 2/10 pain to the right knee to increase functional capacity.  Progressing 1/3/2023    Patient will be independent with ambulating for 700 yards with < 2/10 pain to  increase community capacity.  Progressing 1/3/2023        Plan     Progress duration, workload, and resistance levels of the Therapeutic Activities and Exercises if the patient does not exhibit any pain, swelling, or other symptoms. Progress instruction in-home exercise progression and modification, including symptom management.    Vazquez Jaimes, KORY,   1/3/2023

## 2023-01-03 NOTE — PROGRESS NOTES
30 day PT-PTA face-face discussion with Shay Leone DPT re:Name: Coy Rosado  Clinic Number: [id number] patient status, POC, and plan for progression done

## 2023-01-04 ENCOUNTER — PATIENT MESSAGE (OUTPATIENT)
Dept: ORTHOPEDICS | Facility: CLINIC | Age: 53
End: 2023-01-04
Payer: COMMERCIAL

## 2023-01-04 DIAGNOSIS — Z96.651 S/P TOTAL KNEE REPLACEMENT USING CEMENT, RIGHT: Primary | ICD-10-CM

## 2023-01-05 ENCOUNTER — CLINICAL SUPPORT (OUTPATIENT)
Dept: REHABILITATION | Facility: HOSPITAL | Age: 53
End: 2023-01-05
Payer: COMMERCIAL

## 2023-01-05 DIAGNOSIS — M25.561 ACUTE PAIN OF RIGHT KNEE: ICD-10-CM

## 2023-01-05 DIAGNOSIS — R26.89 GAIT, ANTALGIC: Primary | ICD-10-CM

## 2023-01-05 PROCEDURE — 97112 NEUROMUSCULAR REEDUCATION: CPT | Mod: PN

## 2023-01-05 PROCEDURE — 97110 THERAPEUTIC EXERCISES: CPT | Mod: PN

## 2023-01-05 PROCEDURE — 97530 THERAPEUTIC ACTIVITIES: CPT | Mod: PN

## 2023-01-05 NOTE — PROGRESS NOTES
"Physical Therapy Daily Treatment Note     Name: Coy Rosado Jr.  Clinic Number: [id number]    Therapy Diagnosis:   Encounter Diagnoses   Name Primary?    Gait, antalgic Yes    Acute pain of right knee        Physician: Shane Wills III, *    Visit Date: 2023    Physician Orders: PT Eval and Treat   Medical Diagnosis from Referral: M17.11 (ICD-10-CM) - Primary osteoarthritis of right knee  Evaluation Date: 2022  Authorization Period Expiration: 2022  Plan of Care Expiration: 2/15/2023   Progress Note Due: 2023  Visit # / Visits authorized:   Remaining Visits Scheduled - 4  PTA Consecutive Visits - 0/6    12th visit - 1/3/2022 - 69.43   Visit FOTO -  (Date, Score/ turn green )  D/C FOTO - (Date, Score/ turn green )    Time In: 11:00  Time Out: 11:45   Billable Time: 45 minutes  Non-Billable Time: 00 minutes    Precautions: Standard and Fall  Surgical Date: 2022 - Right Total Knee Arthroplasty   Insurance: Payor: AETNA / Plan: AET[employer] CHOICE POS / Product Type: Commercial /     Subjective     Pt. reports 50% improvement since the beginning of PT.  Current pain levels to the Right knee are 4/10  Limiting factors include:  Driving   Sitting/Walking > 30 min   Stairs    Stiffness     Improvements factors include:   Transitional Movements (Sit to Stand)    Pain levels  Walking Capacity   Disuse of the walker   Mobility of the knee       He is compliant with home exercise program.  Response to previous treatment: Session was good. "Good stretching"    Pre-Treatment Pain Ratin/10  Post-Treatment Pain Ratin/10  Location: right knee      Objective   Edema:  Left - 5 cm above (49 cm), at joint line (42 cm), 5 cm below (41cm)  Right - 5 cm above (44 cm), at joint line (43 cm), 5 cm below (38cm)          LOWER EXTREMITY AROM     Left Right    Knee Flexion 120° 107°   Knee Extension 0° -5°   LOWER EXTREMITY PROM   Knee Flexion 125° 113°   Knee Extension 0° -3°            LOWER EXTREMITY " "STRENGTH     Left  Right    Knee Extension 5/5 4+/5   Knee Flexion 4+/5 4-/5   Hip Flexion 4/5 4/5   Hip Abduction 4/5 4/5   Hip Extension 4/5  4/5   Hip ER 4/5  4/5   Hip IR 4/5  4/5     Coy received therapeutic exercises to develop strength, endurance, ROM, and flexibility for 25 minutes including:    Warm-up  Knee Assessment    Table  Short Arc Quad 2x10 with a 3' sec hold (moderate assistance from Physical Therapist)  Heel Slides with Strap x30 with a 5' sec hold at end range  Bridges 3x10   Prone Knee Hang with Ankle Weights 5" min   Prone Hamstring Curls 2x10, No Resistance   Prone Quad Stretch 3x30 sec     Seated    Long Arc Quad #3 3x10,   Long Arc Quad with ball 3x10  Seated Hamstring Stretch 2x30 sec     Standing    Standing Weight Shifts 2x10 with a 3' sec hold   Hamstring Curls 2x12, Blue Theraband   Mini squats - 30 repetitions    Heel raises - 30 repetitions    GASTROC STRETCH - 3'         *Bold exercise performed     Coy participated in neuromuscular re-education activities to improve: Balance, Coordination, Proprioception, Posture, and Neuromuscular Control for 10 minutes. The following activities were included:  Contract-Relax Technique 3x5 with a 3' sec hold (Quads)   Quad Set 2x10 with a 3' sec hold  Heel Digs 2x10 with a 3' sec hold   Tandem Balance on Airex 2x30 sec, Eyes Open + headturns / Eyes Closed   TKE without Ball 3x8 with a 3' sec hold    Standing marches #3 - 10x3  Wall Squats 2x5 with a 5' sec hold    manual therapy techniques: Joint mobilizations, Manual traction, Myofacial release, and Soft tissue Mobilization were applied to the: right knee for 00 minutes, including:  Passive range of motion with overpressure to the right knee in all planes   Grade 2 Mobilization of the Right Knee in all planes  Scar massage     Coy participated in gait training to improve functional mobility and safety for 00  minutes, including:    Coyjulio Rosado Jr. ambulated 120 feet with rolling walker " "device with contact guard assistance assistance. Coy Rosado Jr. displays antalgic gait deviation with limited knee extension. Pt received verbal and tactile cues to improve gait deviations. Pt was Able to improve gait deviations with interventions. Cues given to focus on heel off and toe off to increase knee flexion during ambulation. Weight bearing on R leg was also increased. Decrease external rotation  of the hip     Coy participated in dynamic functional therapeutic activities to improve functional performance for 10  minutes, including:    Visit Number:  8 out of 15   Activities performed   (duration/resistance)     Stair Negotiation Training  Not Performed    Ambulate in the clinic without AD 3# ankle weight 5 laps    Step Up/Down on 4" box  2x10    Lateral Step Down on 4" box  3x8 - Not Performed    Sit to Stand  3x10 - Done   RDLs  3x8                        Home Exercises Provided and Patient Education Provided     Education provided:   - Continue with HEP    Written Home Exercises Provided: Patient instructed to cont prior HEP.  Exercises were reviewed and Coy was able to demonstrate them prior to the end of the session.  Coy demonstrated good  understanding of the education provided.     See EMR under Patient Instructions for exercises provided prior visit.    Assessment     Pt. has been to [facility] for 13 visits. Mr. Rosado is currently 4 weeks, s/p Right Total Knee Arthroplasty. Improvements have been noted with range of motion, weight-bearing tolerance, progression of assistive device, strength, and balance. Limitations still present are full range of motion, antalgic gait, and occupational capacity. New exercises focused on functional mobility with strength training to improve occupational capacity. No modifications were needed. At this time, patient would continue to benefit from skilled Physical Therapy to improve limitations noted above.       Coy Is progressing well " towards His goals.   Pt prognosis is Good.     Pt will continue to benefit from skilled outpatient physical therapy to address the deficits listed in the problem list box on initial evaluation, provide pt/family education and to maximize pt's level of independence in the home and community environment.     Pt's spiritual, cultural and educational needs considered and pt agreeable to plan of care and goals.    Anticipated barriers to physical therapy: None    Goals:  Short Term Goals: 5 weeks   Patient will be independent with HEP at home to increase PT compliance.  Progressing 1/5/2023      Patient will be independent with 110° of knee flexion and -5° of knee extension to increase mobility Progressing 1/5/2023      Patient will be independent with sleeping for > 5 hours without waking up due to pain to increase sleep capacity  Progressing 1/5/2023               Long Term Goals: 10 weeks   Patient will decrease LEFS score to > 65/80 to increase functional capacity.  Progressing 1/5/2023     Patient will independently be able to perform 10 Sit to Stand with < 2/10 pain to the right knee to increase functional capacity.  Progressing 1/5/2023    Patient will be independent with ambulating for 700 yards with < 2/10 pain to increase community capacity.  Progressing 1/5/2023        Plan     Updated Plan of Care: 2x a week for 4 weeks.     Progress duration, workload, and resistance levels of the Therapeutic Activities and Exercises if the patient does not exhibit any pain, swelling, or other symptoms. Progress instruction in-home exercise progression and modification, including symptom management.    Shay Leone, PT, DPT  1/5/2023

## 2023-01-05 NOTE — PATIENT INSTRUCTIONS
Access Code: [email or web address]  URL: [email or web address]  Date: 01/05/2023  Prepared by: Shay Leone

## 2023-01-10 ENCOUNTER — CLINICAL SUPPORT (OUTPATIENT)
Dept: REHABILITATION | Facility: HOSPITAL | Age: 53
End: 2023-01-10
Payer: COMMERCIAL

## 2023-01-10 ENCOUNTER — TELEPHONE (OUTPATIENT)
Dept: ORTHOPEDICS | Facility: CLINIC | Age: 53
End: 2023-01-10
Payer: COMMERCIAL

## 2023-01-10 DIAGNOSIS — R26.89 GAIT, ANTALGIC: Primary | ICD-10-CM

## 2023-01-10 DIAGNOSIS — Z96.651 S/P TOTAL KNEE REPLACEMENT USING CEMENT, RIGHT: ICD-10-CM

## 2023-01-10 DIAGNOSIS — M25.561 ACUTE PAIN OF RIGHT KNEE: ICD-10-CM

## 2023-01-10 PROCEDURE — 97530 THERAPEUTIC ACTIVITIES: CPT | Mod: PN,CQ

## 2023-01-10 PROCEDURE — 97110 THERAPEUTIC EXERCISES: CPT | Mod: PN,CQ

## 2023-01-10 RX ORDER — OXYCODONE HYDROCHLORIDE 5 MG/1
TABLET ORAL
Qty: 40 TABLET | Refills: 0 | Status: SHIPPED | OUTPATIENT
Start: 2023-01-10 | End: 2023-04-17 | Stop reason: ALTCHOICE

## 2023-01-10 NOTE — TELEPHONE ENCOUNTER
Pt called hotline requesting a refill of OXY  to be sent to:    [id number] - [facility] [id number] [place] [address] AT [facility] OF [address] Phone:  [phone]   Fax:  [phone]      Message forwarded to Zoraida LANDAVERDE, pt pleased and verbalized understanding.

## 2023-01-10 NOTE — PROGRESS NOTES
"Physical Therapy Daily Treatment Note     Name: Coy Rosado Jr.  Clinic Number: [id number]    Therapy Diagnosis:   Encounter Diagnoses   Name Primary?    Gait, antalgic Yes    Acute pain of right knee        Physician: Shane Wills III, *    Visit Date: 1/10/2023    Physician Orders: PT Eval and Treat   Medical Diagnosis from Referral: M17.11 (ICD-10-CM) - Primary osteoarthritis of right knee  Evaluation Date: 2022  Authorization Period Expiration: 2022  Plan of Care Expiration: 2/15/2023   Progress Note Due: 2023  Visit # / Visits authorized: 3/20  Remaining Visits Scheduled - 3  PTA Consecutive Visits - 12 visit - 1/3/2022 - 69.43   Visit FOTO -  (Date, Score/ turn green )  D/C FOTO - (Date, Score/ turn green )    Time In: 1:00  Time Out: 1:45   Billable Time: 45 minutes  Non-Billable Time: 00 minutes    Precautions: Standard and Fall  Surgical Date: 2022 - Right Total Knee Arthroplasty   Insurance: Payor: AETNA / Plan: [employer] CHOICE POS / Product Type: Commercial /     Subjective     Pt. Reports: He's started going to the gym to ride the bike and used the leg press at #25  Current pain levels to the Right knee are 4/10        He is compliant with home exercise program.  Response to previous treatment: Session was good. "Good stretching"    Pre-Treatment Pain Ratin/10  Post-Treatment Pain Ratin/10  Location: right knee      Objective       Coy received therapeutic exercises to develop strength, endurance, ROM, and flexibility for 30 minutes including:    Warm-up  Knee Assessment    Table  Short Arc Quad 2x10 with a 3' sec hold (moderate assistance from Physical Therapist)  Heel Slides with Strap x30 with a 5' sec hold at end range  Bridges 3x10   Prone Knee Hang with Ankle Weights 5" min   Prone Hamstring Curls 2x10, No Resistance   Prone Quad Stretch 3x30 sec     Seated    Long Arc Quad #4 3x10,   Long Arc Quad with ball 3x10  Seated Hamstring Stretch 2x30 sec "     Standing    Standing Weight Shifts 2x10 with a 3' sec hold   Hamstring Curls 2x12, Blue Theraband   Mini squats - 30 repetitions    Heel raises - 30 repetitions    GASTROC STRETCH - 3'   Standing marches #4 - 10x3        *Bold exercise performed     Coy participated in neuromuscular re-education activities to improve: Balance, Coordination, Proprioception, Posture, and Neuromuscular Control for 00 minutes. The following activities were included:  Contract-Relax Technique 3x5 with a 3' sec hold (Quads)   Quad Set 2x10 with a 3' sec hold  Heel Digs 2x10 with a 3' sec hold   Tandem Balance on Airex 2x30 sec, Eyes Open + headturns / Eyes Closed   TKE without Ball 3x8 with a 3' sec hold    Wall Squats 2x5 with a 5' sec hold    manual therapy techniques: Joint mobilizations, Manual traction, Myofacial release, and Soft tissue Mobilization were applied to the: right knee for 00 minutes, including:  Passive range of motion with overpressure to the right knee in all planes   Grade 2 Mobilization of the Right Knee in all planes  Scar massage     Coy participated in gait training to improve functional mobility and safety for 00  minutes, including:    Coy Rosado Jr. ambulated 120 feet with rolling walker device with contact guard assistance assistance. Coy Rosado Jr. displays antalgic gait deviation with limited knee extension. Pt received verbal and tactile cues to improve gait deviations. Pt was Able to improve gait deviations with interventions. Cues given to focus on heel off and toe off to increase knee flexion during ambulation. Weight bearing on R leg was also increased. Decrease external rotation  of the hip     oCy participated in dynamic functional therapeutic activities to improve functional performance for 15  minutes, including:    Visit Number:  8 out of 15   Activities performed   (duration/resistance)     Stair Negotiation Training  Not Performed    Ambulate in the clinic without AD 3# ankle weight  "5 laps - NP   Step Up/Down on 8" box  2x10 - Done   Lateral Step Down on 4" box  3x8 - Not Performed    Sit to Stand #10 3x10 - Done   RDLs  3x8                        Home Exercises Provided and Patient Education Provided     Education provided:   - Continue with HEP    Written Home Exercises Provided: Patient instructed to cont prior HEP.  Exercises were reviewed and Coy was able to demonstrate them prior to the end of the session.  Coy demonstrated good  understanding of the education provided.     See EMR under Patient Instructions for exercises provided prior visit.    Assessment     Pt tolerated session well. Step up/downs performed on 8" step. Pt performed well without LOB or difficulty. Pt stated exercises reduced tightness and discomfort in knee. Coy continues to progress and had no adverse effects with exercise.     Coy Is progressing well towards His goals.   Pt prognosis is Good.     Pt will continue to benefit from skilled outpatient physical therapy to address the deficits listed in the problem list box on initial evaluation, provide pt/family education and to maximize pt's level of independence in the home and community environment.     Pt's spiritual, cultural and educational needs considered and pt agreeable to plan of care and goals.    Anticipated barriers to physical therapy: None    Goals:  Short Term Goals: 5 weeks   Patient will be independent with HEP at home to increase PT compliance.  Progressing 1/10/2023      Patient will be independent with 110° of knee flexion and -5° of knee extension to increase mobility Progressing 1/10/2023      Patient will be independent with sleeping for > 5 hours without waking up due to pain to increase sleep capacity  Progressing 1/10/2023               Long Term Goals: 10 weeks   Patient will decrease LEFS score to > 65/80 to increase functional capacity.  Progressing 1/10/2023     Patient will independently be able to perform 10 Sit to Stand with < " 2/10 pain to the right knee to increase functional capacity.  Progressing 1/10/2023    Patient will be independent with ambulating for 700 yards with < 2/10 pain to increase community capacity.  Progressing 1/10/2023        Plan     Updated Plan of Care: 2x a week for 4 weeks.     Progress duration, workload, and resistance levels of the Therapeutic Activities and Exercises if the patient does not exhibit any pain, swelling, or other symptoms. Progress instruction in-home exercise progression and modification, including symptom management.    Vazquez Jaimes PTA,   1/10/2023

## 2023-01-12 ENCOUNTER — CLINICAL SUPPORT (OUTPATIENT)
Dept: REHABILITATION | Facility: HOSPITAL | Age: 53
End: 2023-01-12
Payer: COMMERCIAL

## 2023-01-12 DIAGNOSIS — R26.89 GAIT, ANTALGIC: Primary | ICD-10-CM

## 2023-01-12 DIAGNOSIS — M25.561 ACUTE PAIN OF RIGHT KNEE: ICD-10-CM

## 2023-01-12 PROCEDURE — 97110 THERAPEUTIC EXERCISES: CPT | Mod: PN,CQ

## 2023-01-12 PROCEDURE — 97530 THERAPEUTIC ACTIVITIES: CPT | Mod: PN,CQ

## 2023-01-12 NOTE — PROGRESS NOTES
"Physical Therapy Daily Treatment Note     Name: Coy Rosado Jr.  Clinic Number: [id number]    Therapy Diagnosis:   Encounter Diagnoses   Name Primary?    Gait, antalgic Yes    Acute pain of right knee        Physician: Shane Wills III, *    Visit Date: 2023    Physician Orders: PT Eval and Treat   Medical Diagnosis from Referral: M17.11 (ICD-10-CM) - Primary osteoarthritis of right knee  Evaluation Date: 2022  Authorization Period Expiration: 2022  Plan of Care Expiration: 2/15/2023   Progress Note Due: 2023  Visit # / Visits authorized:   Remaining Visits Scheduled - 2  PTA Consecutive Visits - 12 visit - 1/3/2022 - 69.43   Visit FOTO -  (Date, Score/ turn green )  D/C FOTO - (Date, Score/ turn green )    Time In: 1:00  Time Out: 1:45   Billable Time: 45 minutes  Non-Billable Time: 00 minutes    Precautions: Standard and Fall  Surgical Date: 2022 - Right Total Knee Arthroplasty   Insurance: Payor: AETNA / Plan: [employer] CHOICE POS / Product Type: Commercial /     Subjective     Pt. Reports: Still going to the gym. Slight increase in swelling. Pt ambulates with antalgic gait  Current pain levels to the Right knee are 0/10        He is compliant with home exercise program.  Response to previous treatment: Session was good. "Good stretching"    Pre-Treatment Pain Ratin/10  Post-Treatment Pain Ratin/10  Location: right knee      Objective       Coy received therapeutic exercises to develop strength, endurance, ROM, and flexibility for 30 minutes including:    Warm-up  Knee Assessment    Table  Short Arc Quad 2x10 with a 3' sec hold (moderate assistance from Physical Therapist)  Heel Slides with Strap x30 with a 5' sec hold at end range  Bridges 3x10   Prone Knee Hang with Ankle Weights 5" min   Prone Hamstring Curls 2x10, No Resistance   Prone Quad Stretch 3x30 sec     Seated    Long Arc Quad #4 3x10,   Long Arc Quad with ball 3x10  Seated Hamstring Stretch 2x30 sec  "   Swiss ball flexion - 5'      Standing    Standing Weight Shifts 2x10 with a 3' sec hold   Hamstring Curls 2x12, Blue Theraband   Mini squats - 30 repetitions    Heel raises - 30 repetitions    GASTROC STRETCH - 3'   Standing marches #4 - 10x3   B Hip abduction 3x10         *Bold exercise performed     Coy participated in neuromuscular re-education activities to improve: Balance, Coordination, Proprioception, Posture, and Neuromuscular Control for 5 minutes. The following activities were included:  Contract-Relax Technique 3x5 with a 3' sec hold (Quads)   Quad Set 2x10 with a 3' sec hold  Heel Digs 2x10 with a 3' sec hold   Tandem Balance on Airex 2x30 sec, Eyes Open + headturns / Eyes Closed   TKE without Ball 3x8 with a 3' sec hold    Wall Squats 2x5 with a 5' sec hold  B SL stance 3x30s    manual therapy techniques: Joint mobilizations, Manual traction, Myofacial release, and Soft tissue Mobilization were applied to the: right knee for 00 minutes, including:  Passive range of motion with overpressure to the right knee in all planes   Grade 2 Mobilization of the Right Knee in all planes  Scar massage     Coy participated in gait training to improve functional mobility and safety for 00  minutes, including:    Coy Rosado Jr. ambulated 120 feet with rolling walker device with contact guard assistance assistance. Coy Rosado Jr. displays antalgic gait deviation with limited knee extension. Pt received verbal and tactile cues to improve gait deviations. Pt was Able to improve gait deviations with interventions. Cues given to focus on heel off and toe off to increase knee flexion during ambulation. Weight bearing on R leg was also increased. Decrease external rotation  of the hip     Coy participated in dynamic functional therapeutic activities to improve functional performance for 10  minutes, including:    Visit Number:  8 out of 15   Activities performed   (duration/resistance)     Stair Negotiation Training  " Not Performed    Ambulate in the clinic without AD 3# ankle weight 5 laps - Done   Step Up/Down on 8" box  2x10 - Done   Lateral Step Down on 4" box  3x8 - Not Performed    Sit to Stand #10 3x10 - Done   RDLs  3x8                        Home Exercises Provided and Patient Education Provided     Education provided:   - Continue with HEP    Written Home Exercises Provided: Patient instructed to cont prior HEP.  Exercises were reviewed and Coy was able to demonstrate them prior to the end of the session.  Coy demonstrated good  understanding of the education provided.     See EMR under Patient Instructions for exercises provided prior visit.    Assessment     Pt presents 5th week s/p R TKA. Antalgic gait still present. Ambulation with weights performed to increase stance time on L leg and reduce antalgic gait. Gait improved some but deviation returned when weights removed. L leg more challenged with tandem stance balance than right. Pt had no adverse effects with exercises and will benefit from skilled therapy.     Coy Is progressing well towards His goals.   Pt prognosis is Good.     Pt will continue to benefit from skilled outpatient physical therapy to address the deficits listed in the problem list box on initial evaluation, provide pt/family education and to maximize pt's level of independence in the home and community environment.     Pt's spiritual, cultural and educational needs considered and pt agreeable to plan of care and goals.    Anticipated barriers to physical therapy: None    Goals:  Short Term Goals: 5 weeks   Patient will be independent with HEP at home to increase PT compliance.  Progressing 1/12/2023      Patient will be independent with 110° of knee flexion and -5° of knee extension to increase mobility Progressing 1/12/2023      Patient will be independent with sleeping for > 5 hours without waking up due to pain to increase sleep capacity  Progressing 1/12/2023               Long Term " Goals: 10 weeks   Patient will decrease LEFS score to > 65/80 to increase functional capacity.  Progressing 1/12/2023     Patient will independently be able to perform 10 Sit to Stand with < 2/10 pain to the right knee to increase functional capacity.  Progressing 1/12/2023    Patient will be independent with ambulating for 700 yards with < 2/10 pain to increase community capacity.  Progressing 1/12/2023        Plan     Updated Plan of Care: 2x a week for 4 weeks.     Progress duration, workload, and resistance levels of the Therapeutic Activities and Exercises if the patient does not exhibit any pain, swelling, or other symptoms. Progress instruction in-home exercise progression and modification, including symptom management.    Vazquez Jaimes PTA,   1/12/2023

## 2023-01-17 ENCOUNTER — HOSPITAL ENCOUNTER (OUTPATIENT)
Dept: RADIOLOGY | Facility: HOSPITAL | Age: 53
Discharge: HOME | End: 2023-01-17
Attending: ORTHOPAEDIC SURGERY
Payer: COMMERCIAL

## 2023-01-17 ENCOUNTER — OFFICE VISIT (OUTPATIENT)
Dept: ORTHOPEDICS | Facility: CLINIC | Age: 53
End: 2023-01-17
Payer: COMMERCIAL

## 2023-01-17 ENCOUNTER — TELEPHONE (OUTPATIENT)
Dept: ORTHOPEDICS | Facility: CLINIC | Age: 53
End: 2023-01-17

## 2023-01-17 ENCOUNTER — CLINICAL SUPPORT (OUTPATIENT)
Dept: REHABILITATION | Facility: HOSPITAL | Age: 53
End: 2023-01-17
Payer: COMMERCIAL

## 2023-01-17 DIAGNOSIS — R26.89 GAIT, ANTALGIC: Primary | ICD-10-CM

## 2023-01-17 DIAGNOSIS — M25.561 ACUTE PAIN OF RIGHT KNEE: ICD-10-CM

## 2023-01-17 DIAGNOSIS — Z96.651 S/P TOTAL KNEE REPLACEMENT USING CEMENT, RIGHT: ICD-10-CM

## 2023-01-17 DIAGNOSIS — Z96.651 S/P TOTAL KNEE REPLACEMENT USING CEMENT, RIGHT: Primary | ICD-10-CM

## 2023-01-17 PROCEDURE — 73562 XR KNEE 3 VIEW RIGHT: ICD-10-PCS | Mod: 26,RT,, | Performed by: RADIOLOGY

## 2023-01-17 PROCEDURE — 97530 THERAPEUTIC ACTIVITIES: CPT | Mod: PN

## 2023-01-17 PROCEDURE — 73562 X-RAY EXAM OF KNEE 3: CPT | Mod: 26,RT,, | Performed by: RADIOLOGY

## 2023-01-17 PROCEDURE — 97110 THERAPEUTIC EXERCISES: CPT | Mod: PN

## 2023-01-17 PROCEDURE — 73562 X-RAY EXAM OF KNEE 3: CPT | Mod: TC,RT

## 2023-01-17 PROCEDURE — 99024 POSTOP FOLLOW-UP VISIT: CPT | Mod: S$GLB,,, | Performed by: ORTHOPAEDIC SURGERY

## 2023-01-17 PROCEDURE — 97112 NEUROMUSCULAR REEDUCATION: CPT | Mod: PN

## 2023-01-17 PROCEDURE — 99024 PR POST-OP FOLLOW-UP VISIT: ICD-10-PCS | Mod: S$GLB,,, | Performed by: ORTHOPAEDIC SURGERY

## 2023-01-17 NOTE — TELEPHONE ENCOUNTER
I called the patient regarding the message below. The patient did not answer. I left a voicemail with a call back number.    ----- Message from Ward Curtis sent at 1/17/2023 11:46 AM CST -----  Good morning,     Do you mind calling his human resources to see what they need?    Sincerely,   Nehemiah Curtis MS, OTC  OR & Clinical Assistant to Dr. Shane iWlls III  Phone: [phone]  Fax: [phone]    ----- Message -----  From: Samia Alan  Sent: 1/17/2023  11:09 AM CST  To: [facility] Staff    Pt calling in regards to [employer] resources needing more information , needs the to know how   long pt will be out of work, needs as soon as possible       fax [phone]         Office [phone] Lucinda Basurto                 Confirmed patient's contact info below:  Contact Name: Coy Rosado  Phone Number: [phone]

## 2023-01-17 NOTE — PROGRESS NOTES
Subjective:     HPI:   Coy Rosado Jr. is a 52 y.o. male who presents 6 weeks out from right TKA    Date of surgery: 12/5/22    Medications: oxy BID, tyl BID  oxy refilled 1/10/23, robaxin refilled 1/7/23  On celebrex 100mg pre-op, now taking 200mg daily but still has 200mg pills so likely not taking every day    Assistive Devices: cane for long distances/balance, not using cane with PT    PT: ongoing, 2 more visits    Limitations: none    Some days feel good, ready to go  This morning drove himself to appointment    Overall doing well, feels like he is making progress, happy with progress       Objective:   There is no height or weight on file to calculate BMI.  Exam:    Gait: limp/antalgic none    Incision: healed    Stability:  Knee stable anterior-posterior varus and valgus stresses, 5 deg ext lag  5/5 KE at 90deg     Extension: 0    Flexion: 115    Valgus angle: 5    Pre-op 0-120  PT 1/5/23: 3-113      Imaging:  Indication:  Exam status post right total knee arthroplasty  Exam Ordered: Radiographs of the right knee include a standing anteroposterior view, a lateral view in full flexion, and a sunrise view  Details of Examination: Todays exam show a well fixed, well positioned total knee arthroplasty with no evidence of wear, osteolysis, or loosening.  Impression:  Status post right total knee arthroplasty, implant in good position with no abnormality      Assessment:       ICD-10-CM ICD-9-CM   1. S/P total knee replacement using cement, right  Z96.651 V43.65      Doing well     Plan:       Patient is doing very well with their total knee arthroplasty.  They will continue with their routine care of the knee replacement and see me back for their follow-up at the routine interval.  If there are problems in the interim they will see me back sooner.    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            Past Medical History:   Diagnosis Date    Allergy     Diabetes mellitus, type 2     HTN (hypertension)     Low back pain        Past Surgical History:   Procedure Laterality Date    ARTHROPLASTY, KNEE, TOTAL, USING COMPUTER-ASSISTED NAVIGATION Right 12/5/2022    Procedure: ARTHROPLASTY, KNEE, TOTAL, USING COMPUTER-ASSISTED NAVIGATION: LURDES: RIGHT: MARTHA - TRIATHALON;  Surgeon: Shane Wills III, MD;  Location: [facility];  Service: Orthopedics;  Laterality: Right;    COLONOSCOPY      leg laceration  1984       Family History   Problem Relation Age of Onset    Bone cancer Mother     Stroke Father     Diabetes type II Father     No Known Problems Sister     No Known Problems Brother     No Known Problems Brother     No Known Problems Daughter        Social History     Socioeconomic History    Marital status:     Number of children: 1   Tobacco Use    Smoking status: Never    Smokeless tobacco: Never   Substance and Sexual Activity    Alcohol use: Never    Drug use: Never    Sexual activity: Yes

## 2023-01-17 NOTE — PROGRESS NOTES
"Physical Therapy Daily Treatment Note     Name: Coy Rosado Jr.  Clinic Number: [id number]    Therapy Diagnosis:   Encounter Diagnoses   Name Primary?    Gait, antalgic Yes    Acute pain of right knee        Physician: Shane Wills III, *    Visit Date: 2023    Physician Orders: PT Eval and Treat   Medical Diagnosis from Referral: M17.11 (ICD-10-CM) - Primary osteoarthritis of right knee  Evaluation Date: 2022  Authorization Period Expiration: 2022  Plan of Care Expiration: 2/15/2023   Progress Note Due: 2023  Visit # / Visits authorized:   Remaining Visits Scheduled - 1  PTA Consecutive Visits - 0/6    12th visit - 1/3/2022 - 69.43  15 Visit FOTO -  (2023  D/C FOTO - (Date, Score/ turn green )    Time In: 1:00  Time Out: 1:45   Billable Time: 45 minutes  Non-Billable Time: 00 minutes    Precautions: Standard and Fall  Surgical Date: 2022 - Right Total Knee Arthroplasty   Insurance: Payor: AETNA / Plan: AET[employer] CHOICE POS / Product Type: Commercial /     Subjective     Pt. Reports: doing well and was able to drive today. Mr. Rosado saw his provider and noted good progress from Physical Therapy.   Current pain levels to the Right knee are 0/10    He is compliant with home exercise program.  Response to previous treatment: Session was good. "Good stretching"    Pre-Treatment Pain Ratin/10  Post-Treatment Pain Ratin/10  Location: right knee      Objective       Coy received therapeutic exercises to develop strength, endurance, ROM, and flexibility for 8 minutes including:    Warm-up    Table  Short Arc Quad 2x10 with a 3' sec hold (moderate assistance from Physical Therapist)  Heel Slides with Strap x30 with a 5' sec hold at end range  Bridges 3x10   Prone Knee Hang with Ankle Weights 5" min   Prone Hamstring Curls 2x10, No Resistance   Prone Quad Stretch 3x30 sec     Seated    Long Arc Quad #4 3x10,   Long Arc Quad with ball 3x10  Seated Hamstring Stretch 2x30 sec    Swiss " ball flexion - 5'      Standing    Standing Weight Shifts 2x10 with a 3' sec hold   Hamstring Curls 2x12, Blue Theraband   Mini squats - 30 repetitions    Heel raises - 30 repetitions    GASTROC STRETCH - 3'   Standing marches #4 - 10x3   B Hip abduction 3x10     *Bold exercise performed     Coy participated in neuromuscular re-education activities to improve: Balance, Coordination, Proprioception, Posture, and Neuromuscular Control for 10 minutes. The following activities were included:  Contract-Relax Technique 3x5 with a 3' sec hold (Quads)   Quad Set 2x10 with a 3' sec hold  Heel Digs 2x10 with a 3' sec hold   Tandem Balance on Airex 2x30 sec, Eyes Open + headturns / Eyes Closed   TKE without Ball 3x8 with a 3' sec hold    Wall Squats 2x5 with a 5' sec hold  B SL stance 3x30s    manual therapy techniques: Joint mobilizations, Manual traction, Myofacial release, and Soft tissue Mobilization were applied to the: right knee for 00 minutes, including:  Passive range of motion with overpressure to the right knee in all planes   Grade 2 Mobilization of the Right Knee in all planes  Scar massage     Coy participated in gait training to improve functional mobility and safety for 00  minutes, including:    Coy Rosado Jr. ambulated 120 feet with rolling walker device with contact guard assistance assistance. Coy Rosado Jr. displays antalgic gait deviation with limited knee extension. Pt received verbal and tactile cues to improve gait deviations. Pt was Able to improve gait deviations with interventions. Cues given to focus on heel off and toe off to increase knee flexion during ambulation. Weight bearing on R leg was also increased. Decrease external rotation  of the hip     Coy participated in dynamic functional therapeutic activities to improve functional performance for 25  minutes, including:    Visit Number:  8 out of 15   Activities performed   (duration/resistance)     Stair Negotiation Training  5 reps   "  Ambulate in the clinic without AD 3# ankle weight 5 laps   Step Up/Down on 8" box  2x10   Lateral Step Down on 4" box  3x8    Sit to Stand #10 3x10    RDLs, #9  3x8   Squats with Dowel at Midline  3x5 with a 3' sec hold    Lunges with Counter Support 3x8                Home Exercises Provided and Patient Education Provided     Education provided:   - Continue with HEP    Written Home Exercises Provided: Patient instructed to cont prior HEP.  Exercises were reviewed and Coy was able to demonstrate them prior to the end of the session.  Coy demonstrated good  understanding of the education provided.     See EMR under Patient Instructions for exercises provided prior visit.    Assessment     Patient completed the exercise program with some soreness to the Left knee. New exercises focused on functional strength due to continued weakness noted with eccentric control of the quads. Frequent breaks were needed during the stair negotiation and with lunges due to fatigue. No modifications were needed. New home exercise program was given to progress strength at home. Patient would continue to benefit from skilled Physical Therapy to increase lower extremity strength.     Coy Is progressing well towards His goals.   Pt prognosis is Good.     Pt will continue to benefit from skilled outpatient physical therapy to address the deficits listed in the problem list box on initial evaluation, provide pt/family education and to maximize pt's level of independence in the home and community environment.     Pt's spiritual, cultural and educational needs considered and pt agreeable to plan of care and goals.    Anticipated barriers to physical therapy: None    Goals:  Short Term Goals: 5 weeks   Patient will be independent with HEP at home to increase PT compliance.  Progressing 1/17/2023      Patient will be independent with 110° of knee flexion and -5° of knee extension to increase mobility Progressing 1/17/2023      Patient " will be independent with sleeping for > 5 hours without waking up due to pain to increase sleep capacity  Progressing 1/17/2023               Long Term Goals: 10 weeks   Patient will decrease LEFS score to > 65/80 to increase functional capacity.  Progressing 1/17/2023     Patient will independently be able to perform 10 Sit to Stand with < 2/10 pain to the right knee to increase functional capacity.  Progressing 1/17/2023    Patient will be independent with ambulating for 700 yards with < 2/10 pain to increase community capacity.  Progressing 1/17/2023        Plan     Updated Plan of Care: 2x a week for 4 weeks.     Progress duration, workload, and resistance levels of the Therapeutic Activities and Exercises if the patient does not exhibit any pain, swelling, or other symptoms. Progress instruction in-home exercise progression and modification, including symptom management.    Shay Leone, PT, DPT  1/17/2023

## 2023-01-19 ENCOUNTER — TELEPHONE (OUTPATIENT)
Dept: ORTHOPEDICS | Facility: CLINIC | Age: 53
End: 2023-01-19
Payer: COMMERCIAL

## 2023-01-19 ENCOUNTER — CLINICAL SUPPORT (OUTPATIENT)
Dept: REHABILITATION | Facility: HOSPITAL | Age: 53
End: 2023-01-19
Payer: COMMERCIAL

## 2023-01-19 DIAGNOSIS — M25.561 ACUTE PAIN OF RIGHT KNEE: ICD-10-CM

## 2023-01-19 DIAGNOSIS — R26.89 GAIT, ANTALGIC: Primary | ICD-10-CM

## 2023-01-19 PROCEDURE — 97140 MANUAL THERAPY 1/> REGIONS: CPT | Mod: PN

## 2023-01-19 PROCEDURE — 97110 THERAPEUTIC EXERCISES: CPT | Mod: PN

## 2023-01-19 PROCEDURE — 97112 NEUROMUSCULAR REEDUCATION: CPT | Mod: PN

## 2023-01-19 NOTE — PROGRESS NOTES
"Physical Therapy Daily Treatment Note     Name: Coy Rosado Jr.  Clinic Number: [id number]    Therapy Diagnosis:   Encounter Diagnoses   Name Primary?    Gait, antalgic Yes    Acute pain of right knee          Physician: Shane Wills III, *    Visit Date: 2023    Physician Orders: PT Eval and Treat   Medical Diagnosis from Referral: M17.11 (ICD-10-CM) - Primary osteoarthritis of right knee  Evaluation Date: 2022  Authorization Period Expiration: 2022  Plan of Care Expiration: 2/15/2023   Progress Note Due: 2023  Visit # / Visits authorized:   Remaining Visits Scheduled - 1  PTA Consecutive Visits - 0/6    12th visit - 1/3/2022 - 69.43  15th Visit FOTO -  (2023  D/C FOTO - (Date, Score/ turn green )    Time In: 1:00  Time Out: 1:45   Billable Time: 45 minutes  Non-Billable Time: 00 minutes    Precautions: Standard and Fall  Surgical Date: 2022 - Right Total Knee Arthroplasty   Insurance: Payor: AETNA / Plan: AET[employer] CHOICE POS / Product Type: Commercial /     Subjective     Pt. Reports: doing well and was able to drive today. Mr. Rosado saw his provider and noted good progress from Physical Therapy.   Current pain levels to the Right knee are 0/10    He is compliant with home exercise program.  Response to previous treatment: Session was good. "Good stretching"    Pre-Treatment Pain Ratin/10  Post-Treatment Pain Ratin/10  Location: right knee      Objective       Coy received therapeutic exercises to develop strength, endurance, ROM, and flexibility for 25 minutes including:    Warm-up    Table  Short Arc Quad 2x10 with a 3' sec hold (moderate assistance from Physical Therapist)  Heel Slides with Strap x30 with a 5' sec hold at end range  Bridges 3x10   Prone Knee Hang with Ankle Weights 5" min   Prone Hamstring Curls 2x10, No Resistance   Prone Quad Stretch 3x30 sec     Seated    Long Arc Quad #4 3x10,   Long Arc Quad with ball 3x10  Seated Hamstring Stretch 2x30 sec  "   Swiss ball flexion - 5'  Seated hip flecion - 30reps       Standing    Standing Weight Shifts 2x10 with a 3' sec hold   Hamstring Curls 2x12, Blue Theraband   Mini squats - 30 repetitions    Heel raises - 30 repetitions    GASTROC STRETCH - 3'   Standing marches #4 - 10x3   B Hip abduction 3x10   Heel raises - 30 repititions  (toes ups)     *Bold exercise performed     Coy participated in neuromuscular re-education activities to improve: Balance, Coordination, Proprioception, Posture, and Neuromuscular Control for 10 minutes. The following activities were included:  Contract-Relax Technique 3x5 with a 3' sec hold (Quads)   Quad Set 2x10 with a 3' sec hold  Heel Digs 2x10 with a 3' sec hold   Tandem Balance on Airex 2x30 sec, Eyes Open + headturns / Eyes Closed   TKE without Ball 3x8 with a 3' sec hold    Wall Squats 2x5 with a 5' sec hold  B SL stance 3x30s    manual therapy techniques: Joint mobilizations, Manual traction, Myofacial release, and Soft tissue Mobilization were applied to the: right knee for 10 minutes, including:  Passive range of motion with overpressure to the right knee in all planes   Grade 2 Mobilization of the Right Knee in all planes  Scar massage   Patella mobilization to improve knee flexion     Coy participated in gait training to improve functional mobility and safety for 00  minutes, including:    Coy Rosado Jr. ambulated 120 feet with rolling walker device with contact guard assistance assistance. Coy Rosado Jr. displays antalgic gait deviation with limited knee extension. Pt received verbal and tactile cues to improve gait deviations. Pt was Able to improve gait deviations with interventions. Cues given to focus on heel off and toe off to increase knee flexion during ambulation. Weight bearing on R leg was also increased. Decrease external rotation  of the hip     Coy participated in dynamic functional therapeutic activities to improve functional performance for 25  minutes,  "including:    Visit Number:  8 out of 15   Activities performed   (duration/resistance)     Stair Negotiation Training  5 reps    Ambulate in the clinic without AD 3# ankle weight 5 laps   Step Up/Down on 8" box  2x10   Lateral Step Down on 4" box  3x8    Sit to Stand #10 3x10    RDLs, #9  3x8   Squats with Dowel at Midline  3x5 with a 3' sec hold    Lunges with Counter Support 3x8                Home Exercises Provided and Patient Education Provided     Education provided:   - Continue with HEP    Written Home Exercises Provided: Patient instructed to cont prior HEP.  Exercises were reviewed and Coy was able to demonstrate them prior to the end of the session.  Coy demonstrated good  understanding of the education provided.     See EMR under Patient Instructions for exercises provided prior visit.    Assessment     Patient completed the exercise program with some soreness to the Left knee. Patient displays VMO weakness and gait difficulty during today's treatment sessions.   Patient would continue to benefit from skilled Physical Therapy to increase lower extremity strength.     Coy Is progressing well towards His goals.   Pt prognosis is Good.     Pt will continue to benefit from skilled outpatient physical therapy to address the deficits listed in the problem list box on initial evaluation, provide pt/family education and to maximize pt's level of independence in the home and community environment.     Pt's spiritual, cultural and educational needs considered and pt agreeable to plan of care and goals.    Anticipated barriers to physical therapy: None    Goals:  Short Term Goals: 5 weeks   Patient will be independent with HEP at home to increase PT compliance.  Progressing 1/19/2023      Patient will be independent with 110° of knee flexion and -5° of knee extension to increase mobility Progressing 1/19/2023      Patient will be independent with sleeping for > 5 hours without waking up due to pain to " increase sleep capacity  Progressing 1/19/2023               Long Term Goals: 10 weeks   Patient will decrease LEFS score to > 65/80 to increase functional capacity.  Progressing 1/24/2023     Patient will independently be able to perform 10 Sit to Stand with < 2/10 pain to the right knee to increase functional capacity.  Progressing 1/24/2023    Patient will be independent with ambulating for 700 yards with < 2/10 pain to increase community capacity.  Progressing 1/24/2023        Plan     Updated Plan of Care: 2x a week for 4 weeks.     Progress duration, workload, and resistance levels of the Therapeutic Activities and Exercises if the patient does not exhibit any pain, swelling, or other symptoms. Progress instruction in-home exercise progression and modification, including symptom management.    Jesus Alberto Turner, PT, DPT  1/24/2023

## 2023-01-19 NOTE — TELEPHONE ENCOUNTER
I called the patient regarding the message below. The patient did not answer. I left a voicemail with a call back number.     ----- Message from Ward Curtis sent at 1/18/2023  1:57 PM CST -----  Regarding: FW: PT'S RETURNING A CALL FROM [facility] REGARDING FMLA PAPERWORK  Contact: PT    ----- Message -----  From: Koko Turpin  Sent: 1/18/2023   1:56 PM CST  To: Johann GRIMES Staff  Subject: PT'S RETURNING A CALL FROM [facility] REGARDIN#    Confirmed contact info below:  Contact Name: Coy Rosado  Phone Number: [phone]

## 2023-01-19 NOTE — TELEPHONE ENCOUNTER
I returned the patients phone call. The patient did not answer. I left a voicemail with a call back number.     ----- Message from Ward Curtis sent at 1/19/2023 11:11 AM CST -----  Contact: pt  Good morning,     Can you please give the patient a call?    Sincerely,   Nehemiah Curtis MS, OTC  OR & Clinical Assistant to Dr. Shane Wills III  Phone: [phone]  Fax: [phone]    ----- Message -----  From: Samia Alan  Sent: 1/19/2023  11:06 AM CST  To: Johann GRIMES Staff    Pt returning call to office         Confirmed patient's contact info below:  Contact Name: Coy Rosado  Phone Number: [phone]

## 2023-01-20 ENCOUNTER — TELEPHONE (OUTPATIENT)
Dept: ORTHOPEDICS | Facility: CLINIC | Age: 53
End: 2023-01-20
Payer: COMMERCIAL

## 2023-01-20 NOTE — TELEPHONE ENCOUNTER
I called and spoke to the patient .     ----- Message from Ward Curtis sent at 1/19/2023  2:56 PM CST -----  Regarding: FW: PT'S RETUNRING A CALL FROM [facility]  Contact: pt    ----- Message -----  From: Koko Turpin  Sent: 1/19/2023   2:45 PM CST  To: Johann GRIMES Staff  Subject: PT'S RETUNRING A CALL FROM [facility]              Confirmed contact info below:  Contact Name: Coy Rosado  Phone Number: [phone]

## 2023-01-24 ENCOUNTER — CLINICAL SUPPORT (OUTPATIENT)
Dept: REHABILITATION | Facility: HOSPITAL | Age: 53
End: 2023-01-24
Payer: COMMERCIAL

## 2023-01-24 ENCOUNTER — PATIENT MESSAGE (OUTPATIENT)
Dept: REHABILITATION | Facility: HOSPITAL | Age: 53
End: 2023-01-24

## 2023-01-24 DIAGNOSIS — R26.89 GAIT, ANTALGIC: Primary | ICD-10-CM

## 2023-01-24 DIAGNOSIS — M25.561 ACUTE PAIN OF RIGHT KNEE: ICD-10-CM

## 2023-01-24 PROCEDURE — 97530 THERAPEUTIC ACTIVITIES: CPT | Mod: PN,CQ

## 2023-01-24 PROCEDURE — 97110 THERAPEUTIC EXERCISES: CPT | Mod: PN,CQ

## 2023-01-24 NOTE — PROGRESS NOTES
"Physical Therapy Daily Treatment Note     Name: Coy Rosado Jr.  Clinic Number: [id number]    Therapy Diagnosis:   Encounter Diagnoses   Name Primary?    Gait, antalgic Yes    Acute pain of right knee          Physician: Shane Wills III, *    Visit Date: 2023    Physician Orders: PT Eval and Treat   Medical Diagnosis from Referral: M17.11 (ICD-10-CM) - Primary osteoarthritis of right knee  Evaluation Date: 2022  Authorization Period Expiration: 2022  Plan of Care Expiration: 2/15/2023   Progress Note Due: 2023  Visit # / Visits authorized:   Remaining Visits Scheduled - 6  PTA Consecutive Visits -     12th visit - 1/3/2022 - 69.43  15th Visit FOTO -  (2023  D/C FOTO - (Date, Score/ turn green )    Time In: 3:20  Time Out: 4:06   Billable Time: 46 minutes  Non-Billable Time: 00 minutes    Precautions: Standard and Fall  Surgical Date: 2022 - Right Total Knee Arthroplasty   Insurance: Payor: AETNA / Plan: AET[employer] CHOICE POS / Product Type: Commercial /     Subjective     Pt. Reports: No pain today, just stiffness  Current pain levels to the Right knee are 0/10    He is compliant with home exercise program.  Response to previous treatment: Session was good. "Good stretching"    Pre-Treatment Pain Ratin/10  Post-Treatment Pain Ratin/10  Location: right knee      Objective       Coy received therapeutic exercises to develop strength, endurance, ROM, and flexibility for 10 minutes including:    Warm-up    Table  Short Arc Quad 2x10 with a 3' sec hold (moderate assistance from Physical Therapist)  Heel Slides with Strap x30 with a 5' sec hold at end range  Bridges 3x10   Prone Knee Hang with Ankle Weights 5" min   Prone Hamstring Curls 2x10, No Resistance   Prone Quad Stretch 3x30 sec     Seated    Long Arc Quad #4 3x10,   Long Arc Quad with ball 3x10  Seated Hamstring Stretch 2x30 sec    Swiss ball flexion - 5'  Seated hip flexion - 30reps       Standing    Standing Weight " Shifts 2x10 with a 3' sec hold   Hamstring Curls 2x12, Blue Theraband   Mini squats - 30 repetitions    Heel raises - 30 repetitions    GASTROC STRETCH - 3'   Standing marches #4 - 10x3   B Hip abduction 3x10   Heel raises - 30 repititions  (toes ups)     *Bold exercise performed     Coy participated in neuromuscular re-education activities to improve: Balance, Coordination, Proprioception, Posture, and Neuromuscular Control for 10 minutes. The following activities were included:  Contract-Relax Technique 3x5 with a 3' sec hold (Quads)   Quad Set 2x10 with a 3' sec hold  Heel Digs 2x10 with a 3' sec hold   Tandem Balance on Airex 2x30 sec, Eyes Open + headturns / Eyes Closed   TKE without Ball 3x8 with a 3' sec hold    Wall Squats 2x5 with a 5' sec hold  B SL stance 3x30s    manual therapy techniques: Joint mobilizations, Manual traction, Myofacial release, and Soft tissue Mobilization were applied to the: right knee for 00 minutes, including:  Passive range of motion with overpressure to the right knee in all planes   Grade 2 Mobilization of the Right Knee in all planes  Scar massage     Coy participated in gait training to improve functional mobility and safety for 00  minutes, including:    Coy Rosado Jr. ambulated 120 feet with rolling walker device with contact guard assistance assistance. Coy Rosado Jr. displays antalgic gait deviation with limited knee extension. Pt received verbal and tactile cues to improve gait deviations. Pt was Able to improve gait deviations with interventions. Cues given to focus on heel off and toe off to increase knee flexion during ambulation. Weight bearing on R leg was also increased. Decrease external rotation  of the hip     Coy participated in dynamic functional therapeutic activities to improve functional performance for 35  minutes, including:    Visit Number:  8 out of 15   Activities performed   (duration/resistance)     Stair Negotiation Training  5 reps   "  Ambulate in the clinic without AD 3# ankle weight 5 laps   Step Up/Down on 8" box  2x10   Lateral Step Down on 4" box  3x8    Sit to Stand #10 3x10    RDLs, #9  3x8   Squats with Dowel at Midline  3x5 with a 3' sec hold    Lunges with Counter Support 3x8                Home Exercises Provided and Patient Education Provided     Education provided:   - Continue with HEP    Written Home Exercises Provided: Patient instructed to cont prior HEP.  Exercises were reviewed and Coy was able to demonstrate them prior to the end of the session.  Coy demonstrated good  understanding of the education provided.     See EMR under Patient Instructions for exercises provided prior visit.    Assessment     Pt tolerated session well. Step-ups on 8" step tolerated well. Step-downs challenging due to poor eccentric control and performed on 4" step. RDLs challenging due to weakness, pt completed all reps with increased rest breaks. Coy had no adverse effects today and will benefit from skilled therapy to increase R LE strength.    Coy Is progressing well towards His goals.   Pt prognosis is Good.     Pt will continue to benefit from skilled outpatient physical therapy to address the deficits listed in the problem list box on initial evaluation, provide pt/family education and to maximize pt's level of independence in the home and community environment.     Pt's spiritual, cultural and educational needs considered and pt agreeable to plan of care and goals.    Anticipated barriers to physical therapy: None    Goals:  Short Term Goals: 5 weeks   Patient will be independent with HEP at home to increase PT compliance.  Progressing 1/24/2023      Patient will be independent with 110° of knee flexion and -5° of knee extension to increase mobility Progressing 1/24/2023      Patient will be independent with sleeping for > 5 hours without waking up due to pain to increase sleep capacity  Progressing 1/24/2023               Long Term " Goals: 10 weeks   Patient will decrease LEFS score to > 65/80 to increase functional capacity.  Progressing 1/24/2023     Patient will independently be able to perform 10 Sit to Stand with < 2/10 pain to the right knee to increase functional capacity.  Progressing 1/24/2023    Patient will be independent with ambulating for 700 yards with < 2/10 pain to increase community capacity.  Progressing 1/24/2023        Plan     Updated Plan of Care: 2x a week for 4 weeks.     Progress duration, workload, and resistance levels of the Therapeutic Activities and Exercises if the patient does not exhibit any pain, swelling, or other symptoms. Progress instruction in-home exercise progression and modification, including symptom management.    Vazquez Jaimes, KORY,  1/24/2023

## 2023-01-26 ENCOUNTER — CLINICAL SUPPORT (OUTPATIENT)
Dept: REHABILITATION | Facility: HOSPITAL | Age: 53
End: 2023-01-26
Attending: EMERGENCY MEDICINE
Payer: COMMERCIAL

## 2023-01-26 DIAGNOSIS — R26.89 GAIT, ANTALGIC: Primary | ICD-10-CM

## 2023-01-26 DIAGNOSIS — M25.561 ACUTE PAIN OF RIGHT KNEE: ICD-10-CM

## 2023-01-26 PROCEDURE — 97530 THERAPEUTIC ACTIVITIES: CPT | Mod: PN,CQ

## 2023-01-26 NOTE — PROGRESS NOTES
"Physical Therapy Daily Treatment Note     Name: Coy Rosado Jr.  Clinic Number: [id number]    Therapy Diagnosis:   Encounter Diagnoses   Name Primary?    Gait, antalgic Yes    Acute pain of right knee          Physician: Shane Wills III, *    Visit Date: 2023    Physician Orders: PT Eval and Treat   Medical Diagnosis from Referral: M17.11 (ICD-10-CM) - Primary osteoarthritis of right knee  Evaluation Date: 2022  Authorization Period Expiration: 2022  Plan of Care Expiration: 2/15/2023   Progress Note Due: 2023  Visit # / Visits authorized:   Remaining Visits Scheduled - 5  PTA Consecutive Visits - 12 visit - 1/3/2022 - 69.43  15 Visit FOTO -  (2023  D/C FOTO - (Date, Score/ turn green )    Time In: 2:40  Time Out: 3:16  Billable Time: 38 minutes  Non-Billable Time: 00 minutes    Precautions: Standard and Fall  Surgical Date: 2022 - Right Total Knee Arthroplasty   Insurance: Payor: AETNA / Plan: AETNA CHOICE POS / Product Type: Commercial /     Subjective     Pt. Reports: No pain today  Current pain levels to the Right knee are 0/10    He is compliant with home exercise program.  Response to previous treatment: Session was good. "Good stretching"    Pre-Treatment Pain Ratin/10  Post-Treatment Pain Ratin/10  Location: right knee      Objective       Coy received therapeutic exercises to develop strength, endurance, ROM, and flexibility for 00 minutes including:    Warm-up    Table  Short Arc Quad 2x10 with a 3' sec hold (moderate assistance from Physical Therapist)  Heel Slides with Strap x30 with a 5' sec hold at end range  Bridges 3x10   Prone Knee Hang with Ankle Weights 5" min   Prone Hamstring Curls 2x10, No Resistance   Prone Quad Stretch 3x30 sec     Seated    Long Arc Quad #4 3x10,   Long Arc Quad with ball 3x10  Seated Hamstring Stretch 2x30 sec    Swiss ball flexion - 5'  Seated hip flexion - 30reps       Standing    Standing Weight Shifts 2x10 with a " 3' sec hold   Hamstring Curls 2x12, Blue Theraband   Mini squats - 30 repetitions    Heel raises - 30 repetitions    GASTROC STRETCH - 3'   Standing marches #4 - 10x3   B Hip abduction 3x10   Heel raises - 30 repititions  (toes ups)     *Bold exercise performed     Coy participated in neuromuscular re-education activities to improve: Balance, Coordination, Proprioception, Posture, and Neuromuscular Control for 00 minutes. The following activities were included:  Contract-Relax Technique 3x5 with a 3' sec hold (Quads)   Quad Set 2x10 with a 3' sec hold  Heel Digs 2x10 with a 3' sec hold   Tandem Balance on Airex 2x30 sec, Eyes Open + headturns / Eyes Closed   TKE without Ball 3x8 with a 3' sec hold    Wall Squats 2x5 with a 5' sec hold  B SL stance 3x30s    manual therapy techniques: Joint mobilizations, Manual traction, Myofacial release, and Soft tissue Mobilization were applied to the: right knee for 00 minutes, including:  Passive range of motion with overpressure to the right knee in all planes   Grade 2 Mobilization of the Right Knee in all planes  Scar massage     Coy participated in gait training to improve functional mobility and safety for 00  minutes, including:    Coy Rosado Jr. ambulated 120 feet with rolling walker device with contact guard assistance assistance. Coy Rosado Jr. displays antalgic gait deviation with limited knee extension. Pt received verbal and tactile cues to improve gait deviations. Pt was Able to improve gait deviations with interventions. Cues given to focus on heel off and toe off to increase knee flexion during ambulation. Weight bearing on R leg was also increased. Decrease external rotation  of the hip     Coy participated in dynamic functional therapeutic activities to improve functional performance for 38  minutes, including:    Visit Number:  8 out of 15   Activities performed   (duration/resistance)     Stair Negotiation Training  5 reps    Ambulate in the clinic  "without AD 3# ankle weight 5 laps   Step Up/Down on 8" box  2x10   Lateral Step Down on 4" box  3x8    Sit to Stand #10 3x10    RDLs, #10  3x10   Squats with Dowel at Midline  3x5 with a 3' sec hold    Lunges with Counter Support 3x8              Mobility Assessment  6 min walk test   - Distance achieved: NP  today  TUG    -Trial 1: 10 sec   -Trial 2: 09 sec   -Trial 3: 08 sec    Sit to stand  -Trial 1:  11 reps   -Trial 2:  13 reps   -Trial 3:  14 reps      Home Exercises Provided and Patient Education Provided     Education provided:   - Continue with HEP    Written Home Exercises Provided: Patient instructed to cont prior HEP.  Exercises were reviewed and Coy was able to demonstrate them prior to the end of the session.  Coy demonstrated good  understanding of the education provided.     See EMR under Patient Instructions for exercises provided prior visit.    Assessment     Pt tolerated session well. Mobility tests performed to assess fall risk. TUG test times suggest pt is within normal limits for his age. Sit to stand test indicates pt is average and not a fall risk. RDL weight increased, pt had no adverse effects. Coy had no adverse effects with exercises or mobility test today.     Coy Is progressing well towards His goals.   Pt prognosis is Good.     Pt will continue to benefit from skilled outpatient physical therapy to address the deficits listed in the problem list box on initial evaluation, provide pt/family education and to maximize pt's level of independence in the home and community environment.     Pt's spiritual, cultural and educational needs considered and pt agreeable to plan of care and goals.    Anticipated barriers to physical therapy: None    Goals:  Short Term Goals: 5 weeks   Patient will be independent with HEP at home to increase PT compliance.  Goal Met      Patient will be independent with 110° of knee flexion and -5° of knee extension to increase mobility Progressing " 1/26/2023      Patient will be independent with sleeping for > 5 hours without waking up due to pain to increase sleep capacity  Goal met               Long Term Goals: 10 weeks   Patient will decrease LEFS score to > 65/80 to increase functional capacity.  Progressing 1/26/2023     Patient will independently be able to perform 10 Sit to Stand with < 2/10 pain to the right knee to increase functional capacity.  Goal met   Patient will be independent with ambulating for 700 yards with < 2/10 pain to increase community capacity.  Goal met       Plan     Updated Plan of Care: 2x a week for 4 weeks.     Progress duration, workload, and resistance levels of the Therapeutic Activities and Exercises if the patient does not exhibit any pain, swelling, or other symptoms. Progress instruction in-home exercise progression and modification, including symptom management.    Vazquez Jaimes PTA,  1/26/2023

## 2023-02-01 ENCOUNTER — DOCUMENTATION ONLY (OUTPATIENT)
Dept: REHABILITATION | Facility: HOSPITAL | Age: 53
End: 2023-02-01
Payer: COMMERCIAL

## 2023-02-01 NOTE — PROGRESS NOTES
30 day PT-PTA face-face discussion with Shay Leone DPT re:Name: Coy Rosado  Clinic Number: [id number] patient status, POC, and plan for progression done

## 2023-02-02 ENCOUNTER — CLINICAL SUPPORT (OUTPATIENT)
Dept: REHABILITATION | Facility: HOSPITAL | Age: 53
End: 2023-02-02
Payer: COMMERCIAL

## 2023-02-02 DIAGNOSIS — M25.561 ACUTE PAIN OF RIGHT KNEE: ICD-10-CM

## 2023-02-02 DIAGNOSIS — R26.89 GAIT, ANTALGIC: Primary | ICD-10-CM

## 2023-02-02 PROCEDURE — 97110 THERAPEUTIC EXERCISES: CPT | Mod: PN,CQ

## 2023-02-02 PROCEDURE — 97530 THERAPEUTIC ACTIVITIES: CPT | Mod: PN,CQ

## 2023-02-02 PROCEDURE — 97112 NEUROMUSCULAR REEDUCATION: CPT | Mod: PN,CQ

## 2023-02-02 NOTE — PROGRESS NOTES
"Physical Therapy Daily Treatment Note     Name: Coy Rosado Jr.  Clinic Number: [id number]    Therapy Diagnosis:   Encounter Diagnoses   Name Primary?    Gait, antalgic Yes    Acute pain of right knee            Physician: Shane Wills III, *    Visit Date: 2023    Physician Orders: PT Eval and Treat   Medical Diagnosis from Referral: M17.11 (ICD-10-CM) - Primary osteoarthritis of right knee  Evaluation Date: 2022  Authorization Period Expiration: 2022  Plan of Care Expiration: 2/15/2023   Progress Note Due: 2023  Visit # / Visits authorized:   Remaining Visits Scheduled - 4  PTA Consecutive Visits - 3/6    12th visit - 1/3/2022 - 69.43  15 Visit FOTO -  (2023  D/C FOTO - (Date, Score/ turn green )    Time In: 11:09  Time Out: 12:04  Billable Time: 55 minutes  Non-Billable Time: 00 minutes    Precautions: Standard and Fall  Surgical Date: 2022 - Right Total Knee Arthroplasty   Insurance: Payor: AETNA / Plan: AET[employer] CHOICE POS / Product Type: Commercial /     Subjective     Pt. Reports: No pain today  Current pain levels to the Right knee are 0/10    He is compliant with home exercise program.  Response to previous treatment: Session was good. "Good stretching"    Pre-Treatment Pain Ratin/10  Post-Treatment Pain Ratin/10  Location: right knee      Objective       Coy received therapeutic exercises to develop strength, endurance, ROM, and flexibility for 30 minutes including:    Warm-up    Table  Short Arc Quad 2x10 with a 3' sec hold (moderate assistance from Physical Therapist)  Heel Slides with Strap x30 with a 5' sec hold at end range  Bridges with green theraband 3x10    Straight Leg Raise #2 3x10  Prone Knee Hang with Ankle Weights 5" min   Prone Hamstring Curls 2x10, No Resistance   Prone Quad Stretch 3x30 sec     Seated    Long Arc Quad #4 3x10, (focus on eccentric control)  Long Arc Quad with #2 on ball 3x10  Seated Hamstring Stretch 2x30 sec    Swiss ball flexion " - 5'  Seated hip flexion - 30reps       Standing    Standing Weight Shifts 2x10 with a 3' sec hold   Hamstring Curls 2x12, Blue Theraband   Mini squats - 30 repetitions    Heel raises - 30 repetitions    GASTROC STRETCH - 3'   Standing marches #4 - 10x3   B Hip abduction 3x10   Heel raises - 30 repititions  (toes ups)     *Bold exercise performed     Coy participated in neuromuscular re-education activities to improve: Balance, Coordination, Proprioception, Posture, and Neuromuscular Control for 10 minutes. The following activities were included:  Contract-Relax Technique 3x5 with a 3' sec hold (Quads)   Quad Set 2x10 with a 3' sec hold  Heel Digs 2x10 with a 3' sec hold   Tandem Balance on Airex 2x30 sec, Eyes Open + headturns / Eyes Closed   TKE without Ball 3x8 with a 3' sec hold    Wall Squats 2x5 with a 5' sec hold  B SL stance 3x30s  B SL stance on airex 3x30s    manual therapy techniques: Joint mobilizations, Manual traction, Myofacial release, and Soft tissue Mobilization were applied to the: right knee for 00 minutes, including:  Passive range of motion with overpressure to the right knee in all planes   Grade 2 Mobilization of the Right Knee in all planes  Scar jack Cespedes participated in gait training to improve functional mobility and safety for 00  minutes, including:    Coy Rosado Jr. ambulated 120 feet with rolling walker device with contact guard assistance assistance. Coy Rosado Jr. displays antalgic gait deviation with limited knee extension. Pt received verbal and tactile cues to improve gait deviations. Pt was Able to improve gait deviations with interventions. Cues given to focus on heel off and toe off to increase knee flexion during ambulation. Weight bearing on R leg was also increased. Decrease external rotation  of the hip     Coy participated in dynamic functional therapeutic activities to improve functional performance for 15  minutes, including:    Visit Number:  8 out of 15  "  Activities performed   (duration/resistance)     Stair Negotiation Training  5 reps    Ambulate in the clinic without AD 3# ankle weight 5 laps   Step Up/Down on 8" box  2x10   Lateral Step Down on 4" box  3x8    Sit to Stand #10 3x10    RDLs, #14 3x10   Squats with Dowel at Midline  3x5 with a 3' sec hold    Lunges with Counter Support 3x8   Crab walk with green theraband            Mobility Assessment (Performed 1/262023)  6 min walk test   - Distance achieved: NP  today  TUG    -Trial 1: 10 sec   -Trial 2: 09 sec   -Trial 3: 08 sec    Sit to stand  -Trial 1:  11 reps   -Trial 2:  13 reps   -Trial 3:  14 reps      Home Exercises Provided and Patient Education Provided     Education provided:   - Continue with HEP    Written Home Exercises Provided: Patient instructed to cont prior HEP.  Exercises were reviewed and Coy was able to demonstrate them prior to the end of the session.  Coy demonstrated good  understanding of the education provided.     See EMR under Patient Instructions for exercises provided prior visit.    Assessment     Pt tolerated session well. Weight added to Long Arc Quad with ball to strengthen VMO. Green theraband added to bridges to strengthen glute med. Weight increased with RDLS to strengthen posterior chain. Pt will continue to benefit from skilled therapy.     Coy Is progressing well towards His goals.   Pt prognosis is Good.     Pt will continue to benefit from skilled outpatient physical therapy to address the deficits listed in the problem list box on initial evaluation, provide pt/family education and to maximize pt's level of independence in the home and community environment.     Pt's spiritual, cultural and educational needs considered and pt agreeable to plan of care and goals.    Anticipated barriers to physical therapy: None    Goals:  Short Term Goals: 5 weeks   Patient will be independent with HEP at home to increase PT compliance.  Goal Met      Patient will be " independent with 110° of knee flexion and -5° of knee extension to increase mobility Progressing 2/2/2023      Patient will be independent with sleeping for > 5 hours without waking up due to pain to increase sleep capacity  Goal met               Long Term Goals: 10 weeks   Patient will decrease LEFS score to > 65/80 to increase functional capacity.  Progressing 2/2/2023     Patient will independently be able to perform 10 Sit to Stand with < 2/10 pain to the right knee to increase functional capacity.  Goal met   Patient will be independent with ambulating for 700 yards with < 2/10 pain to increase community capacity.  Goal met       Plan     Updated Plan of Care: 2x a week for 4 weeks.     Progress duration, workload, and resistance levels of the Therapeutic Activities and Exercises if the patient does not exhibit any pain, swelling, or other symptoms. Progress instruction in-home exercise progression and modification, including symptom management.    Vazquez Jaimes PTA,  2/2/2023

## 2023-02-06 ENCOUNTER — TELEPHONE (OUTPATIENT)
Dept: ORTHOPEDICS | Facility: CLINIC | Age: 53
End: 2023-02-06
Payer: COMMERCIAL

## 2023-02-06 NOTE — TELEPHONE ENCOUNTER
I called the patient regarding the message below. The patient did not answer. I left a voicemail with a call back number.    ----- Message from Ward Curtis sent at 2/6/2023  1:00 PM CST -----  Regarding: FW: pt advice  Contact: [phone]  Good afternoon [facility],     Can you please touch base with the patient?    Sincerely,   Nehemiah Curtis MS, OTC  OR & Clinical Assistant to Dr. Shane Wills III  Phone: [phone]  Fax: [phone]    ----- Message -----  From: Maribell MANRIQUEZ May  Sent: 2/6/2023  12:27 PM CST  To: [facility] Staff  Subject: pt advice                                        Pt is requesting call back from [facility] in regards to job still waiting on documentation for FMLA certificate. Something on our end with the doctor. You are supposed to get that over to the HR person at the job. Pls call to discuss.

## 2023-02-08 ENCOUNTER — CLINICAL SUPPORT (OUTPATIENT)
Dept: REHABILITATION | Facility: HOSPITAL | Age: 53
End: 2023-02-08
Payer: COMMERCIAL

## 2023-02-08 DIAGNOSIS — M25.561 ACUTE PAIN OF RIGHT KNEE: ICD-10-CM

## 2023-02-08 DIAGNOSIS — R26.89 GAIT, ANTALGIC: Primary | ICD-10-CM

## 2023-02-08 PROCEDURE — 97530 THERAPEUTIC ACTIVITIES: CPT | Mod: PN,CQ

## 2023-02-08 PROCEDURE — 97110 THERAPEUTIC EXERCISES: CPT | Mod: PN,CQ

## 2023-02-08 PROCEDURE — 97140 MANUAL THERAPY 1/> REGIONS: CPT | Mod: PN,CQ

## 2023-02-08 PROCEDURE — 97112 NEUROMUSCULAR REEDUCATION: CPT | Mod: PN,CQ

## 2023-02-08 NOTE — PROGRESS NOTES
"Physical Therapy Daily Treatment Note     Name: Coy Rosado Jr.  Clinic Number: [id number]    Therapy Diagnosis:   Encounter Diagnoses   Name Primary?    Gait, antalgic Yes    Acute pain of right knee          Physician: Shane Wills III, *    Visit Date: 2023    Physician Orders: PT Eval and Treat   Medical Diagnosis from Referral: M17.11 (ICD-10-CM) - Primary osteoarthritis of right knee  Evaluation Date: 2022  Authorization Period Expiration: 2022  Plan of Care Expiration: 2/15/2023   Progress Note Due: 2023  Visit # / Visits authorized: 10/20  Remaining Visits Scheduled - 3  PTA Consecutive Visits - 12 visit - 1/3/2022 - 69.43   Visit FOTO -  (2023  D/C FOTO - (Date, Score/ turn green )    Time In: 2:00  Time Out: 2:40  Billable Time: 40 minutes  Non-Billable Time: 00 minutes    Precautions: Standard and Fall  Surgical Date: 2022 - Right Total Knee Arthroplasty   Insurance: Payor: AETNA / Plan: AETNA CHOICE POS / Product Type: Commercial /     Subjective     Pt. Reports: No pain just stiffness  Current pain levels to the Right knee are 0/10    He is compliant with home exercise program.  Response to previous treatment: Session was good. "Good stretching"    Pre-Treatment Pain Ratin/10  Post-Treatment Pain Ratin/10  Location: right knee      Objective       Coy received therapeutic exercises to develop strength, endurance, ROM, and flexibility for 8 minutes including:    Warm-up    Table  Short Arc Quad 2x10 with a 3' sec hold (moderate assistance from Physical Therapist)  Heel Slides with Strap x30 with a 5' sec hold at end range  Bridges with green theraband 3x10    Straight Leg Raise #2 3x10  Prone Knee Hang with Ankle Weights 5" min   Prone Hamstring Curls 2x10, No Resistance   Prone Quad Stretch 3x30 sec     Seated    Long Arc Quad #4 3x10, (focus on eccentric control)  Long Arc Quad with #2 on ball 3x10  Seated Hamstring Stretch 2x30 sec    Swiss ball " flexion - 5'  Seated hip flexion - 30reps       Standing    Standing Weight Shifts 2x10 with a 3' sec hold   Hamstring Curls 2x12, Blue Theraband   Mini squats - 30 repetitions    Heel raises - 30 repetitions    GASTROC STRETCH - 3'   Standing marches #4 - 10x3   B Hip abduction 3x10   Heel raises - 30 repititions  (toes ups)     *Bold exercise performed     oCy participated in neuromuscular re-education activities to improve: Balance, Coordination, Proprioception, Posture, and Neuromuscular Control for 8 minutes. The following activities were included:  Contract-Relax Technique 3x5 with a 3' sec hold (Quads)   Quad Set 2x10 with a 3' sec hold  Heel Digs 2x10 with a 3' sec hold   Tandem Balance on Airex 2x30 sec, Eyes Open + headturns / Eyes Closed   TKE without Ball 3x8 with a 3' sec hold    Wall Squats 2x5 with a 5' sec hold  B SL stance 3x30s  B SL stance on airex 3x30s    manual therapy techniques: Joint mobilizations, Manual traction, Myofacial release, and Soft tissue Mobilization were applied to the: right knee for 08 minutes, including:  Passive range of motion with overpressure to the right knee in all planes   Grade 2 Mobilization of the Right Knee in all planes  Scar massage    Tibifemoral distraction    Coy participated in gait training to improve functional mobility and safety for 00  minutes, including:    Coy Rosado Jr. ambulated 120 feet with rolling walker device with contact guard assistance assistance. Coy Rosado Jr. displays antalgic gait deviation with limited knee extension. Pt received verbal and tactile cues to improve gait deviations. Pt was Able to improve gait deviations with interventions. Cues given to focus on heel off and toe off to increase knee flexion during ambulation. Weight bearing on R leg was also increased. Decrease external rotation  of the hip     Coy participated in dynamic functional therapeutic activities to improve functional performance for 16  minutes,  "including:    Visit Number:  8 out of 15   Activities performed   (duration/resistance)     Stair Negotiation Training  5 reps    Ambulate in the clinic without AD 3# ankle weight 5 laps   Step Up/Down on 8" box 2 #5 to mimic carrying groceries 2x10   Lateral Step Down on 4" box  3x8    Sit to Stand #10 3x10    RDLs, #14 3x10   Squats with Dowel at Midline  3x5 with a 3' sec hold    Lunges with Counter Support 3x8   Crab walk with green theraband            Mobility Assessment (Performed 1/262023)  6 min walk test   - Distance achieved: NP  today  TUG    -Trial 1: 10 sec   -Trial 2: 09 sec   -Trial 3: 08 sec    Sit to stand  -Trial 1:  11 reps   -Trial 2:  13 reps   -Trial 3:  14 reps      Home Exercises Provided and Patient Education Provided     Education provided:   - Continue with HEP    Written Home Exercises Provided: Patient instructed to cont prior HEP.  Exercises were reviewed and Coy was able to demonstrate them prior to the end of the session.  Coy demonstrated good  understanding of the education provided.     See EMR under Patient Instructions for exercises provided prior visit.    Assessment     Pt tolerated session well. Tibiofemoral distraction applied to reduce stiffness in knee. Dumb bells added to step-ups to mimic navigating stairs with groceries/bags. Tolerance for single leg stance continues to improve. Coy had no adverse effects with session and will continue to benefit from skilled therapy.     Coy Is progressing well towards His goals.   Pt prognosis is Good.     Pt will continue to benefit from skilled outpatient physical therapy to address the deficits listed in the problem list box on initial evaluation, provide pt/family education and to maximize pt's level of independence in the home and community environment.     Pt's spiritual, cultural and educational needs considered and pt agreeable to plan of care and goals.    Anticipated barriers to physical therapy: " None    Goals:  Short Term Goals: 5 weeks   Patient will be independent with HEP at home to increase PT compliance.  Goal Met      Patient will be independent with 110° of knee flexion and -5° of knee extension to increase mobility Progressing 2/8/2023      Patient will be independent with sleeping for > 5 hours without waking up due to pain to increase sleep capacity  Goal met               Long Term Goals: 10 weeks   Patient will decrease LEFS score to > 65/80 to increase functional capacity.  Progressing 2/8/2023     Patient will independently be able to perform 10 Sit to Stand with < 2/10 pain to the right knee to increase functional capacity.  Goal met   Patient will be independent with ambulating for 700 yards with < 2/10 pain to increase community capacity.  Goal met       Plan     Updated Plan of Care: 2x a week for 4 weeks.     Progress duration, workload, and resistance levels of the Therapeutic Activities and Exercises if the patient does not exhibit any pain, swelling, or other symptoms. Progress instruction in-home exercise progression and modification, including symptom management.    Vazquez Jaimes, KORY,  2/8/2023

## 2023-02-13 ENCOUNTER — CLINICAL SUPPORT (OUTPATIENT)
Dept: REHABILITATION | Facility: HOSPITAL | Age: 53
End: 2023-02-13
Payer: COMMERCIAL

## 2023-02-13 DIAGNOSIS — R26.89 GAIT, ANTALGIC: Primary | ICD-10-CM

## 2023-02-13 DIAGNOSIS — M25.561 ACUTE PAIN OF RIGHT KNEE: ICD-10-CM

## 2023-02-13 PROCEDURE — 97530 THERAPEUTIC ACTIVITIES: CPT | Mod: PN,CQ

## 2023-02-13 PROCEDURE — 97110 THERAPEUTIC EXERCISES: CPT | Mod: PN,CQ

## 2023-02-13 PROCEDURE — 97140 MANUAL THERAPY 1/> REGIONS: CPT | Mod: PN,CQ

## 2023-02-13 NOTE — PROGRESS NOTES
"Physical Therapy Daily Treatment Note     Name: Coy Rosado Jr.  Clinic Number: [id number]    Therapy Diagnosis:   Encounter Diagnoses   Name Primary?    Gait, antalgic Yes    Acute pain of right knee          Physician: Shane Wills III, *    Visit Date: 2023    Physician Orders: PT Eval and Treat   Medical Diagnosis from Referral: M17.11 (ICD-10-CM) - Primary osteoarthritis of right knee  Evaluation Date: 2022  Authorization Period Expiration: 2022  Plan of Care Expiration: 2/15/2023   Progress Note Due: 2023  Visit # / Visits authorized:   Remaining Visits Scheduled - 2  PTA Consecutive Visits - 12 visit - 1/3/2022 - 69.43   Visit FOTO -  (2023  D/C FOTO - (Date, Score/ turn green )    Time In: 3:22  Time Out: 4:06  Billable Time: 44 minutes  Non-Billable Time: 00 minutes    Precautions: Standard and Fall  Surgical Date: 2022 - Right Total Knee Arthroplasty   Insurance: Payor: AETNA / Plan: AETNA CHOICE POS / Product Type: Commercial /     Subjective     Pt. Reports: No pain just stiffness  Current pain levels to the Right knee are 0/10    He is compliant with home exercise program.  Response to previous treatment: Session was good. "Good stretching"    Pre-Treatment Pain Ratin/10  Post-Treatment Pain Ratin/10  Location: right knee      Objective       Coy received therapeutic exercises to develop strength, endurance, ROM, and flexibility for 10 minutes including:    Warm-up    Table  Short Arc Quad 2x10 with a 3' sec hold (moderate assistance from Physical Therapist)  Heel Slides with Strap x30 with a 5' sec hold at end range  Bridges with green theraband 3x10    Straight Leg Raise #2 3x10  Prone Knee Hang with Ankle Weights 5" min   Prone Hamstring Curls 2x10, No Resistance   Prone Quad Stretch 3x30 sec     Seated    Long Arc Quad #4 3x10, (focus on eccentric control)  Long Arc Quad with #2 on ball 3x10  Seated Hamstring Stretch 2x30 sec    Swiss ball " flexion - 5'  Seated hip flexion - 30reps       Standing    Standing Weight Shifts 2x10 with a 3' sec hold   Hamstring Curls 2x12, Blue Theraband   Mini squats - 30 repetitions    Heel raises - 30 repetitions    GASTROC STRETCH - 3'   Standing marches #4 - 10x3   B Hip abduction 3x10   Heel raises - 30 repititions  (toes ups)     *Bold exercise performed     Coy participated in neuromuscular re-education activities to improve: Balance, Coordination, Proprioception, Posture, and Neuromuscular Control for 00 minutes. The following activities were included:  Contract-Relax Technique 3x5 with a 3' sec hold (Quads)   Quad Set 2x10 with a 3' sec hold  Heel Digs 2x10 with a 3' sec hold   Tandem Balance on Airex 2x30 sec, Eyes Open + headturns / Eyes Closed   TKE with Ball 3x8 with a 3' sec hold    Wall Squats 2x10 with a 5' sec hold  B SL stance 3x30s  B SL stance on airex 3x30s    manual therapy techniques: Joint mobilizations, Manual traction, Myofacial release, and Soft tissue Mobilization were applied to the: right knee for 08 minutes, including:  Passive range of motion with overpressure to the right knee in all planes   Grade 2 Mobilization of the Right Knee in all planes  Scar massage    Tibifemoral distraction    Coy participated in gait training to improve functional mobility and safety for 00  minutes, including:    Coy Rosado Jr. ambulated 120 feet with rolling walker device with contact guard assistance assistance. Coy Rosado Jr. displays antalgic gait deviation with limited knee extension. Pt received verbal and tactile cues to improve gait deviations. Pt was Able to improve gait deviations with interventions. Cues given to focus on heel off and toe off to increase knee flexion during ambulation. Weight bearing on R leg was also increased. Decrease external rotation  of the hip     Coy participated in dynamic functional therapeutic activities to improve functional performance for 26  minutes,  "including:    Visit Number:  8 out of 15   Activities performed   (duration/resistance)     Stair Negotiation Training  5 reps    Ambulate in the clinic without AD 3# ankle weight 5 laps   Step Up/Down on 8" box 2 #5 to mimic carrying groceries 2x10   Lateral Step Down on 4" box  3x8    Sit to Stand #10 3x10    RDLs, #14 3x10   Squats with Dowel at Midline  3x5 with a 3' sec hold    Lunges with Counter Support 3x8   Crab walk with green theraband            Mobility Assessment (Performed 1/262023)  6 min walk test   - Distance achieved: NP  today  TUG    -Trial 1: 10 sec   -Trial 2: 09 sec   -Trial 3: 08 sec    Sit to stand  -Trial 1:  11 reps   -Trial 2:  13 reps   -Trial 3:  14 reps      Home Exercises Provided and Patient Education Provided     Education provided:   - Continue with HEP    Written Home Exercises Provided: Patient instructed to cont prior HEP.  Exercises were reviewed and Coy was able to demonstrate them prior to the end of the session.  Coy demonstrated good  understanding of the education provided.     See EMR under Patient Instructions for exercises provided prior visit.    Assessment     Pt tolerated session well. Weight increased with feng exercises to increase lower extremity strength. Slight gait deviation still present due to lateal trunk lean. Pt may benefit from hip abductor strengthening. Coy had no adverse effects to exercise today.      Coy Is progressing well towards His goals.   Pt prognosis is Good.     Pt will continue to benefit from skilled outpatient physical therapy to address the deficits listed in the problem list box on initial evaluation, provide pt/family education and to maximize pt's level of independence in the home and community environment.     Pt's spiritual, cultural and educational needs considered and pt agreeable to plan of care and goals.    Anticipated barriers to physical therapy: None    Goals:  Short Term Goals: 5 weeks   Patient will be independent " with HEP at home to increase PT compliance.  Goal Met      Patient will be independent with 110° of knee flexion and -5° of knee extension to increase mobility Progressing 2/13/2023      Patient will be independent with sleeping for > 5 hours without waking up due to pain to increase sleep capacity  Goal met               Long Term Goals: 10 weeks   Patient will decrease LEFS score to > 65/80 to increase functional capacity.  Progressing 2/13/2023     Patient will independently be able to perform 10 Sit to Stand with < 2/10 pain to the right knee to increase functional capacity.  Goal met   Patient will be independent with ambulating for 700 yards with < 2/10 pain to increase community capacity.  Goal met       Plan     Updated Plan of Care: 2x a week for 4 weeks.     Progress duration, workload, and resistance levels of the Therapeutic Activities and Exercises if the patient does not exhibit any pain, swelling, or other symptoms. Progress instruction in-home exercise progression and modification, including symptom management.    Vazquez Jaimes, PTA,  2/13/2023

## 2023-02-15 ENCOUNTER — CLINICAL SUPPORT (OUTPATIENT)
Dept: REHABILITATION | Facility: HOSPITAL | Age: 53
End: 2023-02-15
Payer: COMMERCIAL

## 2023-02-15 DIAGNOSIS — R26.89 GAIT, ANTALGIC: Primary | ICD-10-CM

## 2023-02-15 DIAGNOSIS — M25.561 ACUTE PAIN OF RIGHT KNEE: ICD-10-CM

## 2023-02-15 PROCEDURE — 97112 NEUROMUSCULAR REEDUCATION: CPT | Mod: PN,CQ

## 2023-02-15 PROCEDURE — 97110 THERAPEUTIC EXERCISES: CPT | Mod: PN,CQ

## 2023-02-15 NOTE — PROGRESS NOTES
"Physical Therapy Daily Treatment Note     Name: Coy Rosado Jr.  Clinic Number: [id number]    Therapy Diagnosis:   Encounter Diagnoses   Name Primary?    Gait, antalgic Yes    Acute pain of right knee          Physician: Shane Wills III, *    Visit Date: 2/15/2023    Physician Orders: PT Eval and Treat   Medical Diagnosis from Referral: M17.11 (ICD-10-CM) - Primary osteoarthritis of right knee  Evaluation Date: 2022  Authorization Period Expiration: 2022  Plan of Care Expiration: 2/15/2023   Progress Note Due: 2023  Visit # / Visits authorized:   Remaining Visits Scheduled - 1  PTA Consecutive Visits -  (PT re-assessed pt before session)    12th visit - 1/3/2022 - 69.43  15th Visit FOTO -  (2023  D/C FOTO - (Date, Score/ turn green )    Time In: 1:07  Time Out: 1:45  Billable Time: 38 minutes  Non-Billable Time: 00 minutes    Precautions: Standard and Fall  Surgical Date: 2022 - Right Total Knee Arthroplasty   Insurance: Payor: AETNA / Plan: AET[employer] CHOICE POS / Product Type: Commercial /     Subjective     Pt. Reports: No pain just stiffness  Current pain levels to the Right knee are 0/10    He is compliant with home exercise program.  Response to previous treatment: Session was good. "Good stretching"    Pre-Treatment Pain Ratin/10  Post-Treatment Pain Ratin/10  Location: right knee      Objective       Coy received therapeutic exercises to develop strength, endurance, ROM, and flexibility for 28 minutes including:    Warm-up    Table  Short Arc Quad 2x10 with a 3' sec hold (moderate assistance from Physical Therapist)  Heel Slides with Strap x30 with a 5' sec hold at end range  Bridges with green theraband 3x10    Straight Leg Raise #3 3x10  Prone Knee Hang with Ankle Weights 5" min   Prone Hamstring Curls 2x10, No Resistance   Prone Quad Stretch 3x30 sec     Seated    Long Arc Quad #4 3x10, (focus on eccentric control)  Long Arc Quad with #2 on ball 3x10  Seated " Hamstring Stretch 2x30 sec    Swiss ball flexion - 5'  Seated hip flexion - 30reps       Standing    Standing Weight Shifts 2x10 with a 3' sec hold   Hamstring Curls 2x12, Blue Theraband   Mini squats - 30 repetitions    Heel raises - 30 repetitions    GASTROC STRETCH - 3'   Standing marches #4 - 10x3   B Hip abduction with green theraband 3x10   Heel raises - 30 repititions  (toes ups)     *Bold exercise performed     Coy participated in neuromuscular re-education activities to improve: Balance, Coordination, Proprioception, Posture, and Neuromuscular Control for 10 minutes. The following activities were included:  Contract-Relax Technique 3x5 with a 3' sec hold (Quads)   Quad Set 2x10 with a 3' sec hold  Heel Digs 2x10 with a 3' sec hold   Tandem Balance on Airex 2x30 sec, Eyes Open + headturns / Eyes Closed   TKE with Ball 3x10 with a 3' sec hold    Wall Squats 2x10 with a 5' sec hold  B SL stance 3x30s  B SL stance on airex 3x30s    manual therapy techniques: Joint mobilizations, Manual traction, Myofacial release, and Soft tissue Mobilization were applied to the: right knee for 00 minutes, including:  Passive range of motion with overpressure to the right knee in all planes   Grade 2 Mobilization of the Right Knee in all planes  Scar massage    Tibifemoral distraction    Coy participated in gait training to improve functional mobility and safety for 00  minutes, including:    Coy Rosado Jr. ambulated 120 feet with rolling walker device with contact guard assistance assistance. Coy Rosado Jr. displays antalgic gait deviation with limited knee extension. Pt received verbal and tactile cues to improve gait deviations. Pt was Able to improve gait deviations with interventions. Cues given to focus on heel off and toe off to increase knee flexion during ambulation. Weight bearing on R leg was also increased. Decrease external rotation  of the hip     Coy participated in dynamic functional therapeutic  "activities to improve functional performance for 00  minutes, including:    Visit Number:  8 out of 15   Activities performed   (duration/resistance)     Stair Negotiation Training  5 reps    Ambulate in the clinic without AD 3# ankle weight 5 laps   Step Up/Down on 8" box 2 #5 to mimic carrying groceries 2x10   Lateral Step Down on 4" box  3x8    Sit to Stand #10 3x10    RDLs, #16 3x10   Squats with Dowel at Midline  3x5 with a 3' sec hold    Lunges with Counter Support 3x8   Crab walk with green theraband            Mobility Assessment (Performed 1/262023)  6 min walk test   - Distance achieved: NP  today  TUG    -Trial 1: 10 sec   -Trial 2: 09 sec   -Trial 3: 08 sec    Sit to stand  -Trial 1:  11 reps   -Trial 2:  13 reps   -Trial 3:  14 reps      Home Exercises Provided and Patient Education Provided     Education provided:   - Continue with HEP    Written Home Exercises Provided: Patient instructed to cont prior HEP.  Exercises were reviewed and Coy was able to demonstrate them prior to the end of the session.  Coy demonstrated good  understanding of the education provided.     See EMR under Patient Instructions for exercises provided prior visit.    Assessment     PT re-assessed pt before session began advising a focus on quad strengthening to reduce swelling in knee. Pt tolerated session well. Weight increased with Straight Leg Raise, pt challenged due to quad weakness, pt challenged to maintain full knee ext during exercise. Green theraband added to hip abduction to increase glute med strength. No adverse effects with  exercises and will continue to benefit from skilled therapy.     Coy Is progressing well towards His goals.   Pt prognosis is Good.     Pt will continue to benefit from skilled outpatient physical therapy to address the deficits listed in the problem list box on initial evaluation, provide pt/family education and to maximize pt's level of independence in the home and community " environment.     Pt's spiritual, cultural and educational needs considered and pt agreeable to plan of care and goals.    Anticipated barriers to physical therapy: None    Goals:  Short Term Goals: 5 weeks   Patient will be independent with HEP at home to increase PT compliance.  Goal Met      Patient will be independent with 110° of knee flexion and -5° of knee extension to increase mobility Progressing 2/15/2023      Patient will be independent with sleeping for > 5 hours without waking up due to pain to increase sleep capacity  Goal met               Long Term Goals: 10 weeks   Patient will decrease LEFS score to > 65/80 to increase functional capacity.  Progressing 2/15/2023     Patient will independently be able to perform 10 Sit to Stand with < 2/10 pain to the right knee to increase functional capacity.  Goal met   Patient will be independent with ambulating for 700 yards with < 2/10 pain to increase community capacity.  Goal met       Plan     Updated Plan of Care: 2x a week for 4 weeks.     Progress duration, workload, and resistance levels of the Therapeutic Activities and Exercises if the patient does not exhibit any pain, swelling, or other symptoms. Progress instruction in-home exercise progression and modification, including symptom management.    Vazquez Jaimes, KORY,  2/15/2023

## 2023-02-16 NOTE — PROGRESS NOTES
"  Physical Therapy Daily Treatment Note      Name: Coy Rosado Jr.  Clinic Number: [id number]     Therapy Diagnosis:        Encounter Diagnoses   Name Primary?    Gait, antalgic Yes    Acute pain of right knee              Physician: Shane Wills III, *     Visit Date: 2/15/2023     Physician Orders: PT Eval and Treat   Medical Diagnosis from Referral: M17.11 (ICD-10-CM) - Primary osteoarthritis of right knee  Evaluation Date: 2022  Authorization Period Expiration: 2022  Plan of Care Expiration: 2/15/2023   Progress Note Due: 2023  Visit # / Visits authorized:   Remaining Visits Scheduled - 1  PTA Consecutive Visits -  (PT re-assessed pt before session)     12th visit - 1/3/2022 - 69.43  15th Visit FOTO -  (2023  D/C FOTO - (Date, Score/ turn green )     Time In: 1:45  Time Out: 1:50  Billable Time: 05 minutes  Non-Billable Time: 00 minutes     Precautions: Standard and Fall  Surgical Date: 2022 - Right Total Knee Arthroplasty   Insurance: Payor: AETNA / Plan: AETNA CHOICE POS / Product Type: Commercial /      Subjective      Pt. Reports: stiffness  Current pain levels to the Right knee are 0/10     He is compliant with home exercise program.  Response to previous treatment: Session was good. "Good stretching"     Pre-Treatment Pain Ratin/10  Post-Treatment Pain Ratin/10  Location: right knee       Objective       Posture: Fair  Palpation: mild generalized muscle tenderness  Sensation: intact to light touch     Range of Motion/Strength:      Knee Left Right Pain/Dysfunction with Movement   AROM         Knee Flexion (140)  113 °   120 °      Knee Extension (0)  0 °   0 °               RLE 5/5 4+/5 4/5 4-/5 3+/5 3/5 3-/5 2+/5 2/5 2-/5 1/5 0 NT   Hip Flexion      x                      Hip Extension      x                      Hip Abduction      x                      Hip Adduction      x                      Hip Internal Rotation      x                      Hip External " Rotation      x                      Knee Flexion      x                      Knee Extension       x                     Ankle Dorsiflexion      x                      Ankle Plantarflexion      x                         LLE 5/5 4+/5 4/5 4-/5 3+/5 3/5 3-/5 2+/5 2/5 2-/5 1/5 0 NT   Hip Flexion      x                       Hip Extension      x                       Hip Abduction      x                       Hip Adduction      x                       Hip Internal Rotation      x                       Hip External Rotation      x                       Knee Flexion      x                       Knee Extension      x                       Ankle Dorsiflexion      x                       Ankle Plantarflexion      x                                Special Tests Left Right Comments   Single Leg Stance 8 sec 9 sec     Flexibility          90/90 Hamstrings  -35 ° -35 °     Girth Measurements          5 cm above MJL  37.5 cm  36.5 cm      At  MJL  41.5 cm  40 cm      5 cm below MJL  46 cm  45 cm      Joint   Mobility   (grading 0-6 out of 6)          Superior Patella glide 2 2     Inferior Patella glide 2 2     Lateral Patella glide 2 2     Medial Patella glide  2 2        Gait Analysis   Coy ambulated 120 feet with none device with independence assistance. Coy displays lack of stride length and knee extensino gait deviation during 1 gait cycle.       Other:   Sit to Stand - 10 Reps. Completed in 30 sec.  TUG -  <14 Sec. To complete 10ft. (> 14sec. Considered a fall risk)                 Assessment      Patient continues to improve strength, range of motion and functional tolerance for activity.      Coy Is progressing well towards His goals.   Pt prognosis is Good.      Pt will continue to benefit from skilled outpatient physical therapy to address the deficits listed in the problem list box on initial evaluation, provide pt/family education and to maximize pt's level of independence in the home and community environment.       Pt's spiritual, cultural and educational needs considered and pt agreeable to plan of care and goals.     Anticipated barriers to physical therapy: None     Goals:  Short Term Goals: 5 weeks   Patient will be independent with HEP at home to increase PT compliance.  Goal Met      Patient will be independent with 110° of knee flexion and -5° of knee extension to increase mobility Progressing 2/15/2023      Patient will be independent with sleeping for > 5 hours without waking up due to pain to increase sleep capacity  Goal met               Long Term Goals: 10 weeks   Patient will decrease LEFS score to > 65/80 to increase functional capacity.  Progressing 2/15/2023     Patient will independently be able to perform 10 Sit to Stand with < 2/10 pain to the right knee to increase functional capacity.  Goal met   Patient will be independent with ambulating for 700 yards with < 2/10 pain to increase community capacity.  Goal met         Plan      Continue with JEFFERSON Turner, PT    2/15/2023

## 2023-02-27 ENCOUNTER — CLINICAL SUPPORT (OUTPATIENT)
Dept: REHABILITATION | Facility: HOSPITAL | Age: 53
End: 2023-02-27
Payer: COMMERCIAL

## 2023-02-27 DIAGNOSIS — R26.89 GAIT, ANTALGIC: Primary | ICD-10-CM

## 2023-02-27 DIAGNOSIS — M25.561 ACUTE PAIN OF RIGHT KNEE: ICD-10-CM

## 2023-02-27 PROCEDURE — 97140 MANUAL THERAPY 1/> REGIONS: CPT | Mod: PN

## 2023-02-27 PROCEDURE — 97110 THERAPEUTIC EXERCISES: CPT | Mod: PN

## 2023-02-27 PROCEDURE — 97112 NEUROMUSCULAR REEDUCATION: CPT | Mod: PN

## 2023-02-27 PROCEDURE — 97530 THERAPEUTIC ACTIVITIES: CPT | Mod: PN

## 2023-02-27 NOTE — PROGRESS NOTES
"Physical Therapy Daily Treatment Note     Name: Coy Rosado Jr.  Clinic Number: [id number]    Therapy Diagnosis:   Encounter Diagnoses   Name Primary?    Gait, antalgic Yes    Acute pain of right knee      Physician: Shane Wills III, *    Visit Date: 2023    Physician Orders: PT Eval and Treat   Medical Diagnosis from Referral: M17.11 (ICD-10-CM) - Primary osteoarthritis of right knee  Evaluation Date: 2022  Authorization Period Expiration: 2022  Plan of Care Expiration: , 3/27/2023  Progress Note Due: 3/27/2023  Visit # / Visits authorized:   Remaining Visits Scheduled - 0  PTA Consecutive Visits - 0    12th visit - 1/3/2022 - 69.43  D/C FOTO - (Date, Score/ turn green )    Time In: 8:12 (patient presented late to appointment)   Time Out: 9:12  Billable Time: 60 minutes  Non-Billable Time: 00 minutes    Precautions: Standard and Fall  Surgical Date: 2022 - Right Total Knee Arthroplasty   Insurance: Payor: ANDREWSTNA / Plan: AETJONG CHOICE POS / Product Type: Commercial /     Subjective     Pt. reports 60% improvement since the beginning of PT.  Current pain levels to the Right knee are 0/10, but increase in soreness.   Limiting factors include:  Sitting > 30 min (leads to stiffness)   Stiffness   Right knee feels fatigue after 30 min      Improvements factors include:   Transitional Movements (Sit to Stand)   Pain levels  Walking Capacity   Disuse of the walker   Mobility of the knee   Driving   Stairs gotten better (has challenges with steepness of the stairs)     He is compliant with home exercise program.  Response to previous treatment: Session was good. "Good stretching"    Pre-Treatment Pain Ratin/10  Post-Treatment Pain Ratin/10  Location: right knee      Objective      Posture: Fair  Palpation: mild generalized muscle tenderness  Sensation: intact to light touch     Range of Motion/Strength:      Knee Left Right Pain/Dysfunction with Movement   AROM         Knee Flexion (140) " " 120°   113 °      Knee Extension (0)  0 °   -3 °               RLE 5/5 4+/5 4/5 4-/5 3+/5 3/5 3-/5 2+/5 2/5 2-/5 1/5 0 NT   Hip Flexion    x  x                       Hip Extension      x                       Hip Abduction      x                       Hip Internal Rotation      x                       Hip External Rotation      x                       Knee Flexion    x                         Knee Extension      x                         LLE 5/5 4+/5 4/5 4-/5 3+/5 3/5 3-/5 2+/5 2/5 2-/5 1/5 0 NT   Hip Flexion    x                         Hip Extension      x                       Hip Abduction    x                         Hip Internal Rotation    x                         Hip External Rotation    x                         Knee Flexion  x                           Knee Extension x                                  Other:   Sit to Stand - 10 Reps. Completed in 30 sec.  TUG -  <14 Sec. To complete 10ft. (> 14sec. Considered a fall risk)     Coy received therapeutic exercises to develop strength, endurance, ROM, and flexibility for 15 minutes including:    Warm-up  Reassessment      Table  Short Arc Quad 2x10 with a 3' sec hold (moderate assistance from Physical Therapist)  Heel Slides with Strap x30 with a 5' sec hold at end range  Bridges with green theraband 3x10    Straight Leg Raise #3 3x10  Prone Knee Hang with Ankle Weights 5" min   Prone Hamstring Curls 2x10, No Resistance   Prone Quad Stretch 3x30 sec     Seated    Long Arc Quad #4 3x10, (focus on eccentric control)  Long Arc Quad with #2 on ball 3x10  Seated Hamstring Stretch 2x30 sec    Swiss ball flexion - 5'  Seated hip flexion - 30reps       Standing    Standing Weight Shifts 2x10 with a 3' sec hold   Hamstring Curls 2x12, Blue Theraband   Mini squats - 30 repetitions    Heel raises - 30 repetitions    GASTROC STRETCH - 3'   Standing marches #4 - 10x3   B Hip abduction with green theraband 3x10   Heel raises - 30 repititions  (toes ups)   Lateral Step " "Downs 2x10, bilaterally     *Bold exercise performed     Coy participated in neuromuscular re-education activities to improve: Balance, Coordination, Proprioception, Posture, and Neuromuscular Control for 10 minutes. The following activities were included:  Contract-Relax Technique 3x5 with a 3' sec hold (Quads)   Quad Set 2x10 with a 3' sec hold  Heel Digs 2x10 with a 3' sec hold   Tandem Balance on Airex 2x30 sec, Eyes Open + headturns / Eyes Closed   TKE with Ball 3x10 with a 3' sec hold    Wall Squats 2x10 with a 5' sec hold  B SL stance 3x30s  B SL stance on airex 3x30s  90/90 Supine Slump Nerve 3x8  Single Leg Cone Taps on Airex 2x8    manual therapy techniques: Joint mobilizations, Manual traction, Myofacial release, and Soft tissue Mobilization were applied to the: right knee for 15 minutes, including:  Passive range of motion with overpressure to the right knee in all planes   Grade 3 Mobilization of the Right Knee in all planes  Scar massage    Tibifemoral distraction    Coy participated in gait training to improve functional mobility and safety for 00  minutes, including:    Coy Rosado Jr. ambulated 120 feet with rolling walker device with contact guard assistance assistance. Coy Rosado Jr. displays antalgic gait deviation with limited knee extension. Pt received verbal and tactile cues to improve gait deviations. Pt was Able to improve gait deviations with interventions. Cues given to focus on heel off and toe off to increase knee flexion during ambulation. Weight bearing on R leg was also increased. Decrease external rotation  of the hip     Coy participated in dynamic functional therapeutic activities to improve functional performance for 20  minutes, including:    Visit Number:  8 out of 15   Activities performed   (duration/resistance)     Stair Negotiation Training  5 reps    Ambulate in the clinic without AD 3# ankle weight 5 laps   Step Up/Down on 8" box 2 #5 to mimic carrying groceries " "2x10   Lateral Step Down on 4" box  3x8    Sit to Stand #10 3x10    RDLs, #16 3x10   Squats with Dowel at Midline  3x5 with a 3' sec hold    Lunges with Counter Support 3x8   Crab walk with green theraband     Box Lifts onto Countertop 2x5, #38   Step Down with Railing Support  2x10, bilaterally   [place]/Cone Step Over 3x4 cones              Mobility Assessment (Performed 1/262023)  6 min walk test   - Distance achieved: NP  today  TUG    -Trial 1: 10 sec   -Trial 2: 09 sec   -Trial 3: 08 sec    Sit to stand  -Trial 1:  11 reps   -Trial 2:  13 reps   -Trial 3:  14 reps      Home Exercises Provided and Patient Education Provided     Education provided:   - Continue with HEP    Written Home Exercises Provided: Patient instructed to cont prior HEP.  Exercises were reviewed and Coy was able to demonstrate them prior to the end of the session.  Coy demonstrated good  understanding of the education provided.     See EMR under Patient Instructions for exercises provided prior visit.    Assessment     Pt. has been to [facility] for 24 visits. Mr. Rosado is currently 12 weeks, s/p Right Total Knee Arthroplasty. Improvements have been noted with range of motion, weight-bearing tolerance, progression of assistive device, strength, and balance. Limitations still present are full range of motion, antalgic gait, stiffness and occupational capacity. New exercises focused on functional mobility with strength training to improve occupational capacity. No modifications were needed. A decrease in antalgic gait was noted after today's session with an increase in weight-shift towards the right side. At this time, patient would continue to benefit from skilled Physical Therapy to improve limitations noted above.       Coy Is progressing well towards His goals.   Pt prognosis is Good.     Pt will continue to benefit from skilled outpatient physical therapy to address the deficits listed in the problem list box " on initial evaluation, provide pt/family education and to maximize pt's level of independence in the home and community environment.     Pt's spiritual, cultural and educational needs considered and pt agreeable to plan of care and goals.    Anticipated barriers to physical therapy: None    Goals:  Short Term Goals: 5 weeks   Patient will be independent with HEP at home to increase PT compliance.  Goal Met      Patient will be independent with 110° of knee flexion and -5° of knee extension to increase mobility Goal Met       Patient will be independent with sleeping for > 5 hours without waking up due to pain to increase sleep capacity  Goal met               Long Term Goals: 10 weeks   Patient will decrease LEFS score to > 65/80 to increase functional capacity.  Progressing 2/27/2023     Patient will independently be able to perform 10 Sit to Stand with < 2/10 pain to the right knee to increase functional capacity.  Goal met   Patient will be independent with ambulating for 700 yards with < 2/10 pain to increase community capacity.  Goal met       Plan     Updated Plan of Care: 1x a week for 4 weeks.     Progress duration, workload, and resistance levels of the Therapeutic Activities and Exercises if the patient does not exhibit any pain, swelling, or other symptoms. Progress instruction in-home exercise progression and modification, including symptom management.    Shay Leone, PT, DPT  2/27/2023

## 2023-03-02 ENCOUNTER — TELEPHONE (OUTPATIENT)
Dept: ORTHOPEDICS | Facility: CLINIC | Age: 53
End: 2023-03-02

## 2023-03-02 ENCOUNTER — PATIENT MESSAGE (OUTPATIENT)
Dept: ADMINISTRATIVE | Facility: OTHER | Age: 53
End: 2023-03-02
Payer: COMMERCIAL

## 2023-03-02 ENCOUNTER — OFFICE VISIT (OUTPATIENT)
Dept: ORTHOPEDICS | Facility: CLINIC | Age: 53
End: 2023-03-02
Payer: COMMERCIAL

## 2023-03-02 VITALS — WEIGHT: 251.13 LBS | HEIGHT: 68 IN | BODY MASS INDEX: 38.06 KG/M2

## 2023-03-02 DIAGNOSIS — Z96.651 S/P TOTAL KNEE REPLACEMENT USING CEMENT, RIGHT: Primary | ICD-10-CM

## 2023-03-02 PROCEDURE — 99024 PR POST-OP FOLLOW-UP VISIT: ICD-10-PCS | Mod: S$GLB,,, | Performed by: ORTHOPAEDIC SURGERY

## 2023-03-02 PROCEDURE — 99999 PR PBB SHADOW E&M-EST. PATIENT-LVL III: CPT | Mod: PBBFAC,,, | Performed by: ORTHOPAEDIC SURGERY

## 2023-03-02 PROCEDURE — 99024 POSTOP FOLLOW-UP VISIT: CPT | Mod: S$GLB,,, | Performed by: ORTHOPAEDIC SURGERY

## 2023-03-02 PROCEDURE — 99999 PR PBB SHADOW E&M-EST. PATIENT-LVL III: ICD-10-PCS | Mod: PBBFAC,,, | Performed by: ORTHOPAEDIC SURGERY

## 2023-03-02 RX ORDER — CELECOXIB 100 MG/1
100 CAPSULE ORAL DAILY
Qty: 90 CAPSULE | Refills: 0 | Status: SHIPPED | OUTPATIENT
Start: 2023-03-02

## 2023-03-02 NOTE — PROGRESS NOTES
Subjective:     HPI:   Coy Rosado Jr. is a 52 y.o. male who presents 12 weeks out from right TKA    Date of surgery: 12/5/22    Medications: tylenol as needed, off celebrex and oxycodone, was on 100mg pre-op but needs a refill    Assistive Devices: none    PT: kept going after 6 week visit, plan was 2 more visits, kept doing weekly last visit 2/27/23, working on stairs and kneeling, aggravated knee getting down and up off floor    Doing ok, some stiffness         Objective:   Body mass index is 38.18 kg/m².  Exam:    Gait: limp/antalgic none    Incision: healed    Stability:  Knee stable anterior-posterior varus and valgus stresses,     2 deg extensor lag improved from 5 deg at 6 weeks    Extension: 0    Flexion: 115    Pre-op 0-120  6 week 0-115  PT 0-113      Imaging:  None today        Assessment:       ICD-10-CM ICD-9-CM   1. S/P total knee replacement using cement, right  Z96.651 V43.65      Ext lag improving     Plan:       Patient is doing very well with their total knee arthroplasty.  They will continue with their routine care of the knee replacement and see me back for their follow-up at the routine interval.  If there are problems in the interim they will see me back sooner. Prophylactic antibiotic protocol given and explained to patient.     Rx celebrex 100mg #90, additional refills from PCP  Finish PT, transition to HEP  Rx compound cream  Kneeling protocol    3 month follow up for quad check    RTW: April 3rd  No restrictions  Is going to have to do physical with job for return to work      No orders of the defined types were placed in this encounter.      Medications Ordered This Encounter   Medications    celecoxib (CELEBREX) 100 MG capsule     Sig: Take 1 capsule (100 mg total) by mouth once daily. Additional refills should come from primary care doctor     Dispense:  90 capsule     Refill:  0     Additional refills should come from primary care doctor        Past Medical History:   Diagnosis Date     Allergy     Diabetes mellitus, type 2     HTN (hypertension)     Low back pain        Past Surgical History:   Procedure Laterality Date    ARTHROPLASTY, KNEE, TOTAL, USING COMPUTER-ASSISTED NAVIGATION Right 12/5/2022    Procedure: ARTHROPLASTY, KNEE, TOTAL, USING COMPUTER-ASSISTED NAVIGATION: LURDES: RIGHT: MARTHA - TRIATHALON;  Surgeon: Shane Wills III, MD;  Location: [facility];  Service: Orthopedics;  Laterality: Right;    COLONOSCOPY      leg laceration  1984       Family History   Problem Relation Age of Onset    Bone cancer Mother     Stroke Father     Diabetes type II Father     No Known Problems Sister     No Known Problems Brother     No Known Problems Brother     No Known Problems Daughter        Social History     Socioeconomic History    Marital status:     Number of children: 1   Tobacco Use    Smoking status: Never    Smokeless tobacco: Never   Substance and Sexual Activity    Alcohol use: Never    Drug use: Never    Sexual activity: Yes

## 2023-03-02 NOTE — TELEPHONE ENCOUNTER
I called Lucinda today to check on the patients paperwork for FMLA. She did not answer. I left a voicemail with a call back number.

## 2023-03-03 ENCOUNTER — DOCUMENTATION ONLY (OUTPATIENT)
Dept: REHABILITATION | Facility: HOSPITAL | Age: 53
End: 2023-03-03
Payer: COMMERCIAL

## 2023-03-03 NOTE — PROGRESS NOTES
30 day PT-PTA face-face discussion with Shay Leone DPT re:Name: Coy Rosaod  Clinic Number: [id number] patient status, POC, and plan for progression done

## 2023-03-10 ENCOUNTER — CLINICAL SUPPORT (OUTPATIENT)
Dept: REHABILITATION | Facility: HOSPITAL | Age: 53
End: 2023-03-10
Payer: COMMERCIAL

## 2023-03-10 DIAGNOSIS — R26.89 GAIT, ANTALGIC: Primary | ICD-10-CM

## 2023-03-10 DIAGNOSIS — M25.561 ACUTE PAIN OF RIGHT KNEE: ICD-10-CM

## 2023-03-10 PROCEDURE — 97530 THERAPEUTIC ACTIVITIES: CPT | Mod: PN,CQ

## 2023-03-10 PROCEDURE — 97110 THERAPEUTIC EXERCISES: CPT | Mod: PN,CQ

## 2023-03-10 PROCEDURE — 97140 MANUAL THERAPY 1/> REGIONS: CPT | Mod: PN,CQ

## 2023-03-10 NOTE — PROGRESS NOTES
"Physical Therapy Daily Treatment Note     Name: Coy Rosado Jr.  Clinic Number: [id number]    Therapy Diagnosis:   Encounter Diagnoses   Name Primary?    Gait, antalgic Yes    Acute pain of right knee      Physician: Shane Wills III, *    Visit Date: 3/10/2023    Physician Orders: PT Eval and Treat   Medical Diagnosis from Referral: M17.11 (ICD-10-CM) - Primary osteoarthritis of right knee  Evaluation Date: 2022  Authorization Period Expiration: 2022  Plan of Care Expiration: , 3/27/2023  Progress Note Due: 3/27/2023  Visit # / Visits authorized:   Remaining Visits Scheduled - 2  PTA Consecutive Visits -     12th visit - 1/3/2022 - 69.43  D/C FOTO - (Date, Score/ turn green )    Time In: 12:07 pm  Time Out: 1:01 pm  Billable Time: 54 minutes  Non-Billable Time: 00 minutes    Precautions: Standard and Fall  Surgical Date: 2022 - Right Total Knee Arthroplasty   Insurance: Payor: AETNA / Plan: [employer] CHOICE POS / Product Type: Commercial /     Subjective     Pt. Reports: stiffness in the morning. Still has trendelenburg gait  Current pain levels to the Right knee are 0/10, but increase in soreness.       He is compliant with home exercise program.  Response to previous treatment: Session was good. "Good stretching"    Pre-Treatment Pain Ratin/10  Post-Treatment Pain Ratin/10  Location: right knee      Objective     Coy received therapeutic exercises to develop strength, endurance, ROM, and flexibility for 36 minutes including:    Warm-up  Reassessment      Table  Short Arc Quad 2x10 with a 3' sec hold (moderate assistance from Physical Therapist)  Heel Slides with Strap x30 with a 5' sec hold at end range  Bridges with blue theraband 3x10    Straight Leg Raise #3 3x10  Prone Knee Hang with Ankle Weights 5" min   Prone Hamstring Curls 2x10, No Resistance   Prone Quad Stretch 3x30 sec   R Bent Knee fallout 3x10   R Cone hop overs 3x8    Seated    Long Arc Quad #4 3x10, (focus on " eccentric control)  Long Arc Quad with #2 on ball 3x10  Seated Hamstring Stretch 2x30 sec    Swiss ball flexion - 5'  Seated hip flexion - 30reps       Standing    Standing Weight Shifts 2x10 with a 3' sec hold   Hamstring Curls 2x12, Blue Theraband   Mini squats - 30 repetitions    Heel raises - 30 repetitions    GASTROC STRETCH - 3'   Standing marches #4 - 10x3   B Hip abduction with grey theraband 3x10   Heel raises - 30 repititions  (toes ups)   Lateral Step Downs 2x10, bilaterally     *Bold exercise performed     Coy participated in neuromuscular re-education activities to improve: Balance, Coordination, Proprioception, Posture, and Neuromuscular Control for 00 minutes. The following activities were included:  Contract-Relax Technique 3x5 with a 3' sec hold (Quads)   Quad Set 2x10 with a 3' sec hold  Heel Digs 2x10 with a 3' sec hold   Tandem Balance on Airex 2x30 sec, Eyes Open + headturns / Eyes Closed   TKE with Ball 3x10 with a 3' sec hold    Wall Squats 2x10 with a 5' sec hold  B SL stance 3x30s  B SL stance on airex 3x30s  90/90 Supine Slump Nerve 3x8  Single Leg Cone Taps on Airex 2x8    manual therapy techniques: Joint mobilizations, Manual traction, Myofacial release, and Soft tissue Mobilization were applied to the: right knee for 8 minutes, including:  Passive range of motion with overpressure to the right knee in all planes   Grade 3 Mobilization of the Right Knee in all planes  Scar massage    Tibifemoral distraction    Coy participated in gait training to improve functional mobility and safety for 00  minutes, including:    Coy Rosado Jr. ambulated 120 feet with rolling walker device with contact guard assistance assistance. Coy Rosado Jr. displays antalgic gait deviation with limited knee extension. Pt received verbal and tactile cues to improve gait deviations. Pt was Able to improve gait deviations with interventions. Cues given to focus on heel off and toe off to increase knee flexion  "during ambulation. Weight bearing on R leg was also increased. Decrease external rotation  of the hip     Coy participated in dynamic functional therapeutic activities to improve functional performance for 10  minutes, including:    Visit Number:  8 out of 15   Activities performed   (duration/resistance)     Stair Negotiation Training  5 reps    Ambulate in the clinic without AD 3# ankle weight 5 laps   Step Up/Down on 8" box 2 #5 to mimic carrying groceries 2x10   Lateral Step up on 6" box  3x10    Sit to Stand #10 3x10    RDLs, #16 3x10   Squats with Dowel at Midline  3x5 with a 3' sec hold    Lunges with Counter Support 3x8   Crab walk with green theraband     Box Lifts onto Countertop 2x5, #38   Step Down with Railing Support  2x10, bilaterally   Lorraine/Cone Step Over 3x4 cones              Mobility Assessment (Performed 1/262023)  6 min walk test   - Distance achieved: NP  today  TUG    -Trial 1: 10 sec   -Trial 2: 09 sec   -Trial 3: 08 sec    Sit to stand  -Trial 1:  11 reps   -Trial 2:  13 reps   -Trial 3:  14 reps      Home Exercises Provided and Patient Education Provided     Education provided:   - Continue with HEP    Written Home Exercises Provided: Patient instructed to cont prior HEP.  Exercises were reviewed and Coy was able to demonstrate them prior to the end of the session.  Coy demonstrated good  understanding of the education provided.     See EMR under Patient Instructions for exercises provided prior visit.    Assessment     Pt tolerated session well. Session focused on improved hip abductor strength to reduce gait deviation, all exercises added for this purpose. Supine cone hop overs most challenging. Verbal cues given to maintain knee extension, pt demonstrated understanding when given increased rest break. Standing hip abduction challenging due to increased resistance, pt completed all reps. Coy had no adverse effects with exercises and will continue to benefit from skilled therapy. "      Coy Is progressing well towards His goals.   Pt prognosis is Good.     Pt will continue to benefit from skilled outpatient physical therapy to address the deficits listed in the problem list box on initial evaluation, provide pt/family education and to maximize pt's level of independence in the home and community environment.     Pt's spiritual, cultural and educational needs considered and pt agreeable to plan of care and goals.    Anticipated barriers to physical therapy: None    Goals:  Short Term Goals: 5 weeks   Patient will be independent with HEP at home to increase PT compliance.  Goal Met      Patient will be independent with 110° of knee flexion and -5° of knee extension to increase mobility Goal Met       Patient will be independent with sleeping for > 5 hours without waking up due to pain to increase sleep capacity  Goal met               Long Term Goals: 10 weeks   Patient will decrease LEFS score to > 65/80 to increase functional capacity.  Progressing 3/10/2023     Patient will independently be able to perform 10 Sit to Stand with < 2/10 pain to the right knee to increase functional capacity.  Goal met   Patient will be independent with ambulating for 700 yards with < 2/10 pain to increase community capacity.  Goal met       Plan     Updated Plan of Care: 1x a week for 4 weeks.     Progress duration, workload, and resistance levels of the Therapeutic Activities and Exercises if the patient does not exhibit any pain, swelling, or other symptoms. Progress instruction in-home exercise progression and modification, including symptom management.    Vazquez Jaimes PTA,  3/10/2023

## 2023-03-13 ENCOUNTER — TELEPHONE (OUTPATIENT)
Dept: ORTHOPEDICS | Facility: CLINIC | Age: 53
End: 2023-03-13
Payer: COMMERCIAL

## 2023-03-13 ENCOUNTER — CLINICAL SUPPORT (OUTPATIENT)
Dept: REHABILITATION | Facility: HOSPITAL | Age: 53
End: 2023-03-13
Payer: COMMERCIAL

## 2023-03-13 DIAGNOSIS — M25.561 ACUTE PAIN OF RIGHT KNEE: ICD-10-CM

## 2023-03-13 DIAGNOSIS — R26.89 GAIT, ANTALGIC: Primary | ICD-10-CM

## 2023-03-13 PROCEDURE — 97530 THERAPEUTIC ACTIVITIES: CPT | Mod: PN

## 2023-03-13 PROCEDURE — 97112 NEUROMUSCULAR REEDUCATION: CPT | Mod: PN

## 2023-03-13 NOTE — TELEPHONE ENCOUNTER
I called and spoke to the patient regarding the message below. I informed the patient that I talked with Lucinda with [facility]. I informed the patient that Lucinda has emailed the forms and we will try to have it done before the end of the week.     The patient verbalized understanding and has no further questions.     ----- Message from Ward Curtis sent at 3/13/2023  2:18 PM CDT -----  Regarding: FW: pt advice  Contact: [phone]  Good afternoon,     Can you please call and touch base with the patient?    Sincerely,   Nehemiah Curtis MS, OTC  OR & Clinical Assistant to Dr. Shane Wills III  Phone: [phone]  Fax: [phone]    ----- Message -----  From: Maribell Busby  Sent: 3/13/2023   1:35 PM CDT  To: [facility]  Subject: pt advice                                        Pt stated this is an urgent call. Pt stated it is in regards FMLA paper work. They said they have not received the certificate for health care document from the office. Pls call to discuss. Fax#[phone]. Pt is requesting to speak to [facility]. Also pls send the return to work paper work as well. Pt needs a call to confirm that it is done if possible.

## 2023-03-13 NOTE — PROGRESS NOTES
"Physical Therapy Daily Treatment Note     Name: Coy Rosado Jr.  Clinic Number: [id number]    Therapy Diagnosis:   Encounter Diagnoses   Name Primary?    Gait, antalgic Yes    Acute pain of right knee        Physician: Shane Wills III, *    Visit Date: 3/13/2023    Physician Orders: PT Eval and Treat   Medical Diagnosis from Referral: M17.11 (ICD-10-CM) - Primary osteoarthritis of right knee  Evaluation Date: 2022  Authorization Period Expiration: 2022  Plan of Care Expiration: , 3/27/2023  Progress Note Due: 3/27/2023  Visit # / Visits authorized: 15/20  Remaining Visits Scheduled - 1  PTA Consecutive Visits - 06     visit - 1/3/2022 - 69.43  D/C FOTO - (Date, Score/ turn green )    Time In: 11:55 am (patient came late)   Time Out: 12:30 am  Billable Time: 35 minutes  Non-Billable Time: 00 minutes    Precautions: Standard and Fall  Surgical Date: 2022 - Right Total Knee Arthroplasty   Insurance: Payor: AETNA / Plan: [employer]ET[employer] CHOICE POS / Product Type: Commercial /     Subjective     Pt. Reports: stiffness still present in the morning but doing well.     He is compliant with home exercise program.  Response to previous treatment: Session was good. "Good stretching"    Pre-Treatment Pain Ratin/10  Post-Treatment Pain Ratin/10  Location: right knee      Objective     Coy received therapeutic exercises to develop strength, endurance, ROM, and flexibility for 5 minutes including:    Warm-up  Reassessment      Table  Short Arc Quad 2x10 with a 3' sec hold (moderate assistance from Physical Therapist)  Heel Slides with Strap x30 with a 5' sec hold at end range  Bridges with blue theraband 3x10    Straight Leg Raise #3 3x10  Prone Knee Hang with Ankle Weights 5" min   Prone Hamstring Curls 2x10, No Resistance   Prone Quad Stretch 3x30 sec   R Bent Knee fallout 3x10   R Cone hop overs 3x8    Seated    Long Arc Quad #4 3x10, (focus on eccentric control)  Long Arc Quad with #2 on ball " "3x10  Seated Hamstring Stretch 2x30 sec    Swiss ball flexion - 5'  Seated hip flexion - 30reps   Seated Hip Internal Rotation/External Rotation (mimic driving) with 1/2 Bolster Underneath 3x10, Green Theraband       Standing    Standing Weight Shifts 2x10 with a 3' sec hold   Hamstring Curls 2x12, Blue Theraband   Mini squats - 30 repetitions    Heel raises - 30 repetitions    GASTROC STRETCH - 3'   Standing marches #4 - 10x3   B Hip abduction with grey theraband 3x10   Heel raises - 30 repititions  (toes ups)   Lateral Step Downs 2x10, bilaterally     *Bold exercise performed     Coy participated in neuromuscular re-education activities to improve: Balance, Coordination, Proprioception, Posture, and Neuromuscular Control for 10 minutes. The following activities were included:  Contract-Relax Technique 3x5 with a 3' sec hold (Quads)   Quad Set 2x10 with a 3' sec hold  Heel Digs 2x10 with a 3' sec hold   Tandem Balance on Airex 2x30 sec, Eyes Open + headturns / Eyes Closed   TKE with Ball 3x10 with a 3' sec hold    Wall Squats 2x10 with a 5' sec hold  B SL stance 3x30s  B SL stance on airex 3x30s  90/90 Supine Slump Nerve 3x8  Single Leg Cone Taps on Airex 2x8  Ankle Weight at Distal Toes into Standing Hip Flexion 3x8, #5    manual therapy techniques: Joint mobilizations, Manual traction, Myofacial release, and Soft tissue Mobilization were applied to the: right knee for 00 minutes, including:  Passive range of motion with overpressure to the right knee in all planes   Grade 3 Mobilization of the Right Knee in all planes  Scar massage    Tibifemoral distraction      Coy participated in dynamic functional therapeutic activities to improve functional performance for 15  minutes, including:    Visit Number:  8 out of 15   Activities performed   (duration/resistance)     Stair Negotiation Training  5 reps    Ambulate in the clinic without AD 3# ankle weight 5 laps   Step Up/Down on 8" box 2 #5 to mimic carrying " "groceries 2x10   Lateral Step up on 6" box  3x10    Sit to Stand #10 3x10    RDLs, #16 3x10   Squats with Dowel at Midline  3x5 with a 3' sec hold    Lunges with Counter Support 3x8   Crab walk with blue theraband  3x10 ft   Box Lifts onto Countertop 2x5, #38   Step Down with Railing Support  2x10, bilaterally   Lorraine/Cone Step Over 3x4 cones              Mobility Assessment (Performed 1/262023)  6 min walk test   - Distance achieved: NP  today  TUG    -Trial 1: 10 sec   -Trial 2: 09 sec   -Trial 3: 08 sec    Sit to stand  -Trial 1:  11 reps   -Trial 2:  13 reps   -Trial 3:  14 reps      Home Exercises Provided and Patient Education Provided     Education provided:   - Continue with HEP    Written Home Exercises Provided: Patient instructed to cont prior HEP.  Exercises were reviewed and Coy was able to demonstrate them prior to the end of the session.  Coy demonstrated good  understanding of the education provided.     See EMR under Patient Instructions for exercises provided prior visit.    Assessment     Patient completed the exercise program with no adverse effects. 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 No modifications or cues were needed during today's program. Patient is to begin working on the first week of April and is ready to be discharged next visit.     Coy Is progressing well towards His goals.   Pt prognosis is Good.     Pt will continue to benefit from skilled outpatient physical therapy to address the deficits listed in the problem list box on initial evaluation, provide pt/family education and to maximize pt's level of independence in the home and community environment.     Pt's spiritual, cultural and educational needs considered and pt agreeable to plan of care and goals.    Anticipated barriers to physical therapy: None    Goals:  Short Term Goals: 5 weeks   Patient will be independent with HEP at home " to increase PT compliance.  Goal Met      Patient will be independent with 110° of knee flexion and -5° of knee extension to increase mobility Goal Met       Patient will be independent with sleeping for > 5 hours without waking up due to pain to increase sleep capacity  Goal met               Long Term Goals: 10 weeks   Patient will decrease LEFS score to > 65/80 to increase functional capacity.  Progressing 3/13/2023     Patient will independently be able to perform 10 Sit to Stand with < 2/10 pain to the right knee to increase functional capacity.  Goal met   Patient will be independent with ambulating for 700 yards with < 2/10 pain to increase community capacity.  Goal met       Plan     Discharge next visit.     Shay Leone, PT, DPT  3/13/2023

## 2023-03-20 ENCOUNTER — TELEPHONE (OUTPATIENT)
Dept: ORTHOPEDICS | Facility: CLINIC | Age: 53
End: 2023-03-20
Payer: COMMERCIAL

## 2023-03-20 ENCOUNTER — CLINICAL SUPPORT (OUTPATIENT)
Dept: REHABILITATION | Facility: HOSPITAL | Age: 53
End: 2023-03-20
Payer: COMMERCIAL

## 2023-03-20 DIAGNOSIS — M25.561 ACUTE PAIN OF RIGHT KNEE: ICD-10-CM

## 2023-03-20 DIAGNOSIS — R26.89 GAIT, ANTALGIC: Primary | ICD-10-CM

## 2023-03-20 PROCEDURE — 97112 NEUROMUSCULAR REEDUCATION: CPT | Mod: PN

## 2023-03-20 PROCEDURE — 97110 THERAPEUTIC EXERCISES: CPT | Mod: PN

## 2023-03-20 PROCEDURE — 97530 THERAPEUTIC ACTIVITIES: CPT | Mod: PN

## 2023-03-20 NOTE — TELEPHONE ENCOUNTER
I called the patient regarding the message below. The patient did not answer, I left a voicemail with a call back number.     ----- Message from Liliana Crain sent at 3/20/2023 10:15 AM CDT -----  Regarding: Pt Advice  Contact: [phone]  Pt is requesting another return to work paper, states he misplaced it.  Please fax to [phone] Attn to Lucinda Londono.  Please call when this sent.

## 2023-03-20 NOTE — PROGRESS NOTES
Physical Therapy Daily Treatment Note/Discharged      Name: Coy Rosado Jr.  Clinic Number: [id number]    Therapy Diagnosis:   Encounter Diagnoses   Name Primary?    Gait, antalgic Yes    Acute pain of right knee        Physician: Shane Wills III, *    Visit Date: 3/20/2023    Physician Orders: PT Eval and Treat   Medical Diagnosis from Referral: M17.11 (ICD-10-CM) - Primary osteoarthritis of right knee  Evaluation Date: 2022  Authorization Period Expiration: 2022  Plan of Care Expiration: , 3/27/2023  Progress Note Due: 3/27/2023  Visit # / Visits authorized:   Remaining Visits Scheduled - 1  PTA Consecutive Visits - 06     visit - 1/3/2022 - 69.43  D/C FOTO - (Date, Score/ turn green )    Time In: 11:55 am (patient came late)   Time Out: 12:30 am  Billable Time: 35 minutes  Non-Billable Time: 00 minutes    Precautions: Standard and Fall  Surgical Date: 2022 - Right Total Knee Arthroplasty   Insurance: Payor: [employer] / Plan: [employer] CHOICE POS / Product Type: Commercial /     Subjective     Pt. Reports: ready to go back to work     He is compliant with home exercise program.  Response to previous treatment:     Pre-Treatment Pain Ratin/10  Post-Treatment Pain Ratin/10  Location: right knee      Objective     Edema:     Left - at joint line (41 cm),   Right -  at joint line (43 cm),           LOWER EXTREMITY AROM     Left Right    Knee Flexion 120° 110°   Knee Extension 0° -5°              LOWER EXTREMITY STRENGTH     Left  Right    Knee Extension 5/5 4/5   Knee Flexion 4+/5 4/5   Hip Flexion 4/5 4/5   Hip Abduction 4-/5 4/5   Hip Extension 4-/5 4/5   Hip ER 4/5 4/5   Hip IR 4/5 4/5      PALPATION:  Patient reports primary pain region along the right knee, globally     STAIRS: Patient displays a step to gait pattern with stairs and a walker.           PATELLAR TRACKING:      Left Right    Superior Glide Normal Normal   Inferior Glide Normal Normal   Medial Glide Normal Normal  "  Lateral Glide Normal Normal           Coy received therapeutic exercises to develop strength, endurance, ROM, and flexibility for 20 minutes including:    Warm-up  Reassessment      Table  Short Arc Quad 2x10 with a 3' sec hold (moderate assistance from Physical Therapist)  Heel Slides with Strap x30 with a 5' sec hold at end range  Bridges with blue theraband 3x10    Straight Leg Raise #3 3x10  Prone Knee Hang with Ankle Weights 5" min   Prone Hamstring Curls 2x10, No Resistance   Prone Quad Stretch 3x30 sec   R Bent Knee fallout 3x10   R Cone hop overs 3x8    Seated    Long Arc Quad #4 3x10, (focus on eccentric control)  Long Arc Quad with #2 on ball 3x10  Seated Hamstring Stretch 2x30 sec    Swiss ball flexion - 5'  Seated hip flexion - 30reps   Seated Hip Internal Rotation/External Rotation (mimic driving) with 1/2 Bolster Underneath 3x10, Green Theraband       Standing    Standing Weight Shifts 2x10 with a 3' sec hold   Hamstring Curls 2x12, Blue Theraband   Mini squats - 30 repetitions    Heel raises - 30 repetitions    GASTROC STRETCH - 3'   Standing marches #4 - 10x3   B Hip abduction with grey theraband 3x10   Heel raises - 30 repititions  (toes ups)   Lateral Step Downs 2x10, bilaterally     *Bold exercise performed     Coy participated in neuromuscular re-education activities to improve: Balance, Coordination, Proprioception, Posture, and Neuromuscular Control for 10 minutes. The following activities were included:  Contract-Relax Technique 3x5 with a 3' sec hold (Quads)   Quad Set 2x10 with a 3' sec hold  Heel Digs 2x10 with a 3' sec hold   Tandem Balance on Airex 2x30 sec, Eyes Open + headturns / Eyes Closed   TKE with Ball 3x10 with a 3' sec hold    Wall Squats 2x10 with a 5' sec hold  B SL stance 3x30s  B SL stance on airex 3x30s  90/90 Supine Slump Nerve 3x8  Single Leg Cone Taps on Airex 2x8  Ankle Weight at Distal Toes into Standing Hip Flexion 3x8, #5    manual therapy techniques: Joint " "mobilizations, Manual traction, Myofacial release, and Soft tissue Mobilization were applied to the: right knee for 00 minutes, including:  Passive range of motion with overpressure to the right knee in all planes   Grade 3 Mobilization of the Right Knee in all planes  Scar massage    Tibifemoral distraction      Coy participated in dynamic functional therapeutic activities to improve functional performance for 15  minutes, including:    Visit Number:  8 out of 15   Activities performed   (duration/resistance)     Stair Negotiation Training  5 reps    Ambulate in the clinic without AD 3# ankle weight 5 laps   Step Up/Down on 8" box 2 #5 to mimic carrying groceries 2x10   Lateral Step up on 6" box  3x10    Sit to Stand #10 3x10    RDLs, #16 3x10   Squats with Dowel at Midline  3x5 with a 3' sec hold    Lunges with Counter Support 3x8   Crab walk with blue theraband  3x10 ft   Box Lifts onto Countertop 2x5, #38   Step Down with Railing Support  2x10, bilaterally   Lorraine/Cone Step Over 3x4 cones              Mobility Assessment (Performed 1/262023)  6 min walk test   - Distance achieved: NP  today  TUG    -Trial 1: 10 sec   -Trial 2: 09 sec   -Trial 3: 08 sec    Sit to stand  -Trial 1:  11 reps   -Trial 2:  13 reps   -Trial 3:  14 reps      Home Exercises Provided and Patient Education Provided     Education provided:   - Continue with HEP    Written Home Exercises Provided: Patient instructed to cont prior HEP.  Exercises were reviewed and Coy was able to demonstrate them prior to the end of the session.  Coy demonstrated good  understanding of the education provided.     See EMR under Patient Instructions for exercises provided prior visit.    Assessment     Patient completed the exercise program with no adverse effects. Patient was able to achieve goals set during POC. Patient will be discharged from therapy services.     Coy Is progressing well towards His goals.   Pt prognosis is Good.     Pt will " continue to benefit from skilled outpatient physical therapy to address the deficits listed in the problem list box on initial evaluation, provide pt/family education and to maximize pt's level of independence in the home and community environment.     Pt's spiritual, cultural and educational needs considered and pt agreeable to plan of care and goals.    Anticipated barriers to physical therapy: None    Goals:  Short Term Goals: 5 weeks   Patient will be independent with HEP at home to increase PT compliance.  Goal Met      Patient will be independent with 110° of knee flexion and -5° of knee extension to increase mobility Goal Met       Patient will be independent with sleeping for > 5 hours without waking up due to pain to increase sleep capacity  Goal met               Long Term Goals: 10 weeks   Patient will decrease LEFS score to > 65/80 to increase functional capacity.  Goal met   Patient will independently be able to perform 10 Sit to Stand with < 2/10 pain to the right knee to increase functional capacity.  Goal met   Patient will be independent with ambulating for 700 yards with < 2/10 pain to increase community capacity.  Goal met       Plan     Discharge .    Jesus Alberto Turner, PT, DPT  3/20/2023

## 2023-04-05 ENCOUNTER — DOCUMENTATION ONLY (OUTPATIENT)
Dept: REHABILITATION | Facility: HOSPITAL | Age: 53
End: 2023-04-05
Payer: COMMERCIAL

## 2023-04-05 NOTE — PROGRESS NOTES
30 day PT-PTA face-face discussion with Jesus Alberto Turner DPT re: Name: Coy Rosado   Clinic Number: [id number] patient status, POC, and plan for progression done .

## 2023-04-17 ENCOUNTER — LAB VISIT (OUTPATIENT)
Dept: LAB | Facility: OTHER | Age: 53
End: 2023-04-17
Attending: STUDENT IN AN ORGANIZED HEALTH CARE EDUCATION/TRAINING PROGRAM
Payer: COMMERCIAL

## 2023-04-17 ENCOUNTER — OFFICE VISIT (OUTPATIENT)
Dept: INTERNAL MEDICINE | Facility: CLINIC | Age: 53
End: 2023-04-17
Payer: COMMERCIAL

## 2023-04-17 VITALS
OXYGEN SATURATION: 99 % | SYSTOLIC BLOOD PRESSURE: 130 MMHG | WEIGHT: 255.5 LBS | DIASTOLIC BLOOD PRESSURE: 82 MMHG | BODY MASS INDEX: 38.85 KG/M2 | HEART RATE: 80 BPM

## 2023-04-17 DIAGNOSIS — Z13.1 SCREENING FOR DIABETES MELLITUS: ICD-10-CM

## 2023-04-17 DIAGNOSIS — G47.33 OSA (OBSTRUCTIVE SLEEP APNEA): ICD-10-CM

## 2023-04-17 DIAGNOSIS — Z13.6 SCREENING FOR CARDIOVASCULAR CONDITION: ICD-10-CM

## 2023-04-17 DIAGNOSIS — Z00.00 HEALTH MAINTENANCE EXAMINATION: ICD-10-CM

## 2023-04-17 DIAGNOSIS — I10 HYPERTENSION, UNSPECIFIED TYPE: ICD-10-CM

## 2023-04-17 DIAGNOSIS — Z11.59 ENCOUNTER FOR HEPATITIS C SCREENING TEST FOR LOW RISK PATIENT: ICD-10-CM

## 2023-04-17 DIAGNOSIS — Z00.00 HEALTH MAINTENANCE EXAMINATION: Primary | ICD-10-CM

## 2023-04-17 LAB
ALBUMIN SERPL BCP-MCNC: 3.9 G/DL (ref 3.5–5.2)
ALBUMIN/CREAT UR: 2.9 UG/MG (ref 0–30)
ALP SERPL-CCNC: 100 U/L (ref 55–135)
ALT SERPL W/O P-5'-P-CCNC: 17 U/L (ref 10–44)
ANION GAP SERPL CALC-SCNC: 10 MMOL/L (ref 8–16)
AST SERPL-CCNC: 11 U/L (ref 10–40)
BASOPHILS # BLD AUTO: 0.04 K/UL (ref 0–0.2)
BASOPHILS NFR BLD: 0.5 % (ref 0–1.9)
BILIRUB SERPL-MCNC: 0.5 MG/DL (ref 0.1–1)
BUN SERPL-MCNC: 16 MG/DL (ref 6–20)
CALCIUM SERPL-MCNC: 9.7 MG/DL (ref 8.7–10.5)
CHLORIDE SERPL-SCNC: 106 MMOL/L (ref 95–110)
CO2 SERPL-SCNC: 26 MMOL/L (ref 23–29)
CREAT SERPL-MCNC: 0.9 MG/DL (ref 0.5–1.4)
CREAT UR-MCNC: 209.5 MG/DL (ref 23–375)
DIFFERENTIAL METHOD: ABNORMAL
EOSINOPHIL # BLD AUTO: 0.1 K/UL (ref 0–0.5)
EOSINOPHIL NFR BLD: 1.3 % (ref 0–8)
ERYTHROCYTE [DISTWIDTH] IN BLOOD BY AUTOMATED COUNT: 13.2 % (ref 11.5–14.5)
EST. GFR  (NO RACE VARIABLE): >60 ML/MIN/1.73 M^2
ESTIMATED AVG GLUCOSE: 123 MG/DL (ref 68–131)
GLUCOSE SERPL-MCNC: 84 MG/DL (ref 70–110)
HBA1C MFR BLD: 5.9 % (ref 4–5.6)
HCT VFR BLD AUTO: 41.8 % (ref 40–54)
HCV AB SERPL QL IA: NORMAL
HGB BLD-MCNC: 13.7 G/DL (ref 14–18)
IMM GRANULOCYTES # BLD AUTO: 0.04 K/UL (ref 0–0.04)
IMM GRANULOCYTES NFR BLD AUTO: 0.5 % (ref 0–0.5)
LYMPHOCYTES # BLD AUTO: 3.2 K/UL (ref 1–4.8)
LYMPHOCYTES NFR BLD: 41.3 % (ref 18–48)
MCH RBC QN AUTO: 30 PG (ref 27–31)
MCHC RBC AUTO-ENTMCNC: 32.8 G/DL (ref 32–36)
MCV RBC AUTO: 92 FL (ref 82–98)
MICROALBUMIN UR DL<=1MG/L-MCNC: 6 UG/ML
MONOCYTES # BLD AUTO: 0.8 K/UL (ref 0.3–1)
MONOCYTES NFR BLD: 10.9 % (ref 4–15)
NEUTROPHILS # BLD AUTO: 3.5 K/UL (ref 1.8–7.7)
NEUTROPHILS NFR BLD: 45.5 % (ref 38–73)
NRBC BLD-RTO: 0 /100 WBC
PLATELET # BLD AUTO: 271 K/UL (ref 150–450)
PMV BLD AUTO: 9.3 FL (ref 9.2–12.9)
POTASSIUM SERPL-SCNC: 4.3 MMOL/L (ref 3.5–5.1)
PROT SERPL-MCNC: 7.7 G/DL (ref 6–8.4)
RBC # BLD AUTO: 4.57 M/UL (ref 4.6–6.2)
SODIUM SERPL-SCNC: 142 MMOL/L (ref 136–145)
TSH SERPL DL<=0.005 MIU/L-ACNC: 1.22 UIU/ML (ref 0.4–4)
WBC # BLD AUTO: 7.62 K/UL (ref 3.9–12.7)

## 2023-04-17 PROCEDURE — 99396 PREV VISIT EST AGE 40-64: CPT | Mod: S$GLB,,, | Performed by: STUDENT IN AN ORGANIZED HEALTH CARE EDUCATION/TRAINING PROGRAM

## 2023-04-17 PROCEDURE — 80061 LIPID PANEL: CPT | Performed by: STUDENT IN AN ORGANIZED HEALTH CARE EDUCATION/TRAINING PROGRAM

## 2023-04-17 PROCEDURE — 86803 HEPATITIS C AB TEST: CPT | Performed by: STUDENT IN AN ORGANIZED HEALTH CARE EDUCATION/TRAINING PROGRAM

## 2023-04-17 PROCEDURE — 83036 HEMOGLOBIN GLYCOSYLATED A1C: CPT | Performed by: STUDENT IN AN ORGANIZED HEALTH CARE EDUCATION/TRAINING PROGRAM

## 2023-04-17 PROCEDURE — 99999 PR PBB SHADOW E&M-EST. PATIENT-LVL IV: CPT | Mod: PBBFAC,,, | Performed by: STUDENT IN AN ORGANIZED HEALTH CARE EDUCATION/TRAINING PROGRAM

## 2023-04-17 PROCEDURE — 82570 ASSAY OF URINE CREATININE: CPT | Performed by: STUDENT IN AN ORGANIZED HEALTH CARE EDUCATION/TRAINING PROGRAM

## 2023-04-17 PROCEDURE — 99396 PR PREVENTIVE VISIT,EST,40-64: ICD-10-PCS | Mod: S$GLB,,, | Performed by: STUDENT IN AN ORGANIZED HEALTH CARE EDUCATION/TRAINING PROGRAM

## 2023-04-17 PROCEDURE — 36415 COLL VENOUS BLD VENIPUNCTURE: CPT | Performed by: STUDENT IN AN ORGANIZED HEALTH CARE EDUCATION/TRAINING PROGRAM

## 2023-04-17 PROCEDURE — 80053 COMPREHEN METABOLIC PANEL: CPT | Performed by: STUDENT IN AN ORGANIZED HEALTH CARE EDUCATION/TRAINING PROGRAM

## 2023-04-17 PROCEDURE — 99999 PR PBB SHADOW E&M-EST. PATIENT-LVL IV: ICD-10-PCS | Mod: PBBFAC,,, | Performed by: STUDENT IN AN ORGANIZED HEALTH CARE EDUCATION/TRAINING PROGRAM

## 2023-04-17 PROCEDURE — 84443 ASSAY THYROID STIM HORMONE: CPT | Performed by: STUDENT IN AN ORGANIZED HEALTH CARE EDUCATION/TRAINING PROGRAM

## 2023-04-17 PROCEDURE — 85025 COMPLETE CBC W/AUTO DIFF WBC: CPT | Performed by: STUDENT IN AN ORGANIZED HEALTH CARE EDUCATION/TRAINING PROGRAM

## 2023-04-17 RX ORDER — AMOXICILLIN 500 MG
CAPSULE ORAL DAILY
COMMUNITY

## 2023-04-17 RX ORDER — LOSARTAN POTASSIUM 50 MG/1
50 TABLET ORAL DAILY
Qty: 90 TABLET | Refills: 3 | Status: SHIPPED | OUTPATIENT
Start: 2023-04-17 | End: 2023-10-19

## 2023-04-17 NOTE — PROGRESS NOTES
Subjective:       Patient ID: Coy Rosado Jr. is a 52 y.o. male.    Chief Complaint: Health maintenance examination [Z00.00]    Patient is new to me, PCP is Dr. Weiss. Here today for the following:    Health maintenance -   Colonoscopy performed about 4-5 years ago, with [facility]. Unsure screening interval.  Denies family history of colorectal cancer.  Denies significant family history of cardiac disease.  Denies family history of prostate cancer.  UTD on Tdap, COVID primary/booster, shingles (states 2nd dose MAR2023) vaccinations.  Due for COVID bivalent vaccinations.  Never smoker.  Denies current alcohol use.   Denies drug use.  Currently sexually active with wife.  Due for hepatitis C screening.  Completed HIV screening.  Due for lipid screening.  Due for diabetes screening.  Lab Results       Component                Value               Date                       HGBA1C                   5.6                 11/08/2022            Endorses overall healthy diet.   Eating plenty of fresh vegetables, fruits.  Eats mostly at home.  Eats out 1 time weekly.  Endorses exercising routinely since knee surgery.    HTN -   Currently prescribed losartan.  Patient endorses taking medication as directed.  Denies side effects or concerns while taking medication.  Denies headaches, vision changes, CP, palpitations, or other concerning symptoms.  Due for micro albumin creatinine ratio.   BP Readings from Last 3 Encounters:  12/29/22 : 125/62  12/06/22 : (!) 141/75  11/08/22 : (!) 152/89    CHERRY -   Using CPAP nightly as directed  Last sleep study was NOV2022    Review of Systems   Constitutional:  Negative for appetite change, chills, fatigue, fever and unexpected weight change.   Respiratory:  Negative for cough and shortness of breath.    Cardiovascular:  Negative for chest pain, palpitations and leg swelling.   Gastrointestinal:  Negative for abdominal pain, constipation, diarrhea, nausea and vomiting.   Genitourinary:   Negative for difficulty urinating and frequency.   Skin:  Negative for rash.   Neurological:  Negative for dizziness, syncope, weakness and headaches.       Current Outpatient Medications   Medication Instructions    celecoxib (CELEBREX) 100 mg, Oral, Daily, Additional refills should come from primary care doctor    losartan (COZAAR) 50 mg, Oral, Daily    omega-3 fatty acids/fish oil (FISH OIL-OMEGA-3 FATTY ACIDS) 300-1,000 mg capsule Oral, Daily     Objective:      Vitals:    04/17/23 1511   BP: 130/82   Pulse: 80   SpO2: 99%   Weight: 115.9 kg (255 lb 8.2 oz)   PainSc: 0-No pain     Body mass index is 38.85 kg/m².    Physical Exam  Vitals reviewed.   Constitutional:       General: He is not in acute distress.     Appearance: Normal appearance. He is not ill-appearing or diaphoretic.   HENT:      Head: Normocephalic and atraumatic.      Right Ear: Tympanic membrane, ear canal and external ear normal. There is no impacted cerumen.      Left Ear: Tympanic membrane, ear canal and external ear normal. There is no impacted cerumen.      Nose: Nose normal. No rhinorrhea.      Mouth/Throat:      Mouth: Mucous membranes are moist.      Pharynx: Oropharynx is clear. No oropharyngeal exudate or posterior oropharyngeal erythema.   Eyes:      General: No scleral icterus.        Right eye: No discharge.         Left eye: No discharge.      Conjunctiva/sclera: Conjunctivae normal.   Neck:      Thyroid: No thyromegaly or thyroid tenderness.      Trachea: Trachea normal.   Cardiovascular:      Rate and Rhythm: Normal rate and regular rhythm.      Heart sounds: Normal heart sounds. No murmur heard.    No friction rub. No gallop.   Pulmonary:      Effort: Pulmonary effort is normal. No respiratory distress.      Breath sounds: Normal breath sounds. No stridor. No wheezing, rhonchi or rales.   Abdominal:      General: Bowel sounds are normal. There is no distension.      Palpations: Abdomen is soft.      Tenderness: There is no  abdominal tenderness. There is no guarding or rebound.   Musculoskeletal:         General: No swelling or deformity.      Cervical back: Neck supple.   Lymphadenopathy:      Head:      Right side of head: No submandibular or posterior auricular adenopathy.      Left side of head: No submandibular or posterior auricular adenopathy.      Cervical: No cervical adenopathy.      Right cervical: No superficial, deep or posterior cervical adenopathy.     Left cervical: No superficial, deep or posterior cervical adenopathy.      Upper Body:      Right upper body: No supraclavicular adenopathy.      Left upper body: No supraclavicular adenopathy.   Skin:     General: Skin is warm and dry.   Neurological:      General: No focal deficit present.      Mental Status: He is alert. Mental status is at baseline.      Gait: Gait normal.   Psychiatric:         Mood and Affect: Mood normal.         Behavior: Behavior normal.       Assessment:       1. Health maintenance examination    2. Hypertension, unspecified type    3. CHERRY (obstructive sleep apnea)    4. Screening for cardiovascular condition    5. Screening for diabetes mellitus    6. Encounter for hepatitis C screening test for low risk patient        Plan:       Hypertension, unspecified type  Continue current medications.  RTC in 6 months for follow up.  -     TSH; Future  -     CBC Auto Differential; Future  -     Microalbumin/Creatinine Ratio, Urine; Future  -     losartan (COZAAR) 50 MG tablet; Take 1 tablet (50 mg total) by mouth once daily.    CHERRY (obstructive sleep apnea)  -     Ambulatory referral/consult to Sleep Disorders; Future    Health maintenance examination  Reviewed and discussed age appropriate screenings and immunizations.  -     Comprehensive Metabolic Panel; Future  -     TSH; Future  -     Lipid Panel; Future  -     Hemoglobin A1C; Future  -     CBC Auto Differential; Future  -     Hepatitis C Antibody; Future  -     Microalbumin/Creatinine Ratio, Urine;  Future    Screening for cardiovascular condition  -     Lipid Panel; Future    Screening for diabetes mellitus  -     Hemoglobin A1C; Future    Encounter for hepatitis C screening test for low risk patient  -     Hepatitis C Antibody; Future      Jessica Coates MD  4/17/2023

## 2023-04-18 ENCOUNTER — OFFICE VISIT (OUTPATIENT)
Dept: SLEEP MEDICINE | Facility: CLINIC | Age: 53
End: 2023-04-18
Payer: COMMERCIAL

## 2023-04-18 VITALS
BODY MASS INDEX: 38.69 KG/M2 | DIASTOLIC BLOOD PRESSURE: 85 MMHG | HEIGHT: 68 IN | WEIGHT: 255.31 LBS | HEART RATE: 73 BPM | SYSTOLIC BLOOD PRESSURE: 129 MMHG

## 2023-04-18 DIAGNOSIS — I10 HYPERTENSION, UNSPECIFIED TYPE: ICD-10-CM

## 2023-04-18 DIAGNOSIS — G47.33 OSA (OBSTRUCTIVE SLEEP APNEA): Primary | ICD-10-CM

## 2023-04-18 DIAGNOSIS — D64.9 ANEMIA, UNSPECIFIED TYPE: Primary | ICD-10-CM

## 2023-04-18 DIAGNOSIS — G47.30 SLEEP APNEA, UNSPECIFIED TYPE: ICD-10-CM

## 2023-04-18 LAB
CHOLEST SERPL-MCNC: 134 MG/DL (ref 120–199)
CHOLEST/HDLC SERPL: 3.9 {RATIO} (ref 2–5)
HDLC SERPL-MCNC: 34 MG/DL (ref 40–75)
HDLC SERPL: 25.4 % (ref 20–50)
LDLC SERPL CALC-MCNC: 84 MG/DL (ref 63–159)
NONHDLC SERPL-MCNC: 100 MG/DL
TRIGL SERPL-MCNC: 80 MG/DL (ref 30–150)

## 2023-04-18 PROCEDURE — 99214 PR OFFICE/OUTPT VISIT, EST, LEVL IV, 30-39 MIN: ICD-10-PCS | Mod: S$GLB,,, | Performed by: NURSE PRACTITIONER

## 2023-04-18 PROCEDURE — 99999 PR PBB SHADOW E&M-EST. PATIENT-LVL III: ICD-10-PCS | Mod: PBBFAC,,, | Performed by: NURSE PRACTITIONER

## 2023-04-18 PROCEDURE — 99214 OFFICE O/P EST MOD 30 MIN: CPT | Mod: S$GLB,,, | Performed by: NURSE PRACTITIONER

## 2023-04-18 PROCEDURE — 99999 PR PBB SHADOW E&M-EST. PATIENT-LVL III: CPT | Mod: PBBFAC,,, | Performed by: NURSE PRACTITIONER

## 2023-04-18 NOTE — PROGRESS NOTES
"Cc: CHERRY, last seen by Dr. Gabriel 2020    Remains adherent with cpap therapy qhs. Needs compliance report/has to return to docnext week. Using borrowed outdated resmed model, former respironics machine broken. Uses FFM  65/90dused, 50/90d>4hr. AVG 5h/n AHI 12.8cm. AHI 3.5  Denies recurrent apneic pauses or am headaches. Will if not using it though  Bp optimal    HST 11/2020 (no former 2015 records) AHI 23(RDI 35)      /85 (BP Location: Right arm, Patient Position: Sitting, BP Method: Medium (Automatic))   Pulse 73   Ht 5' 8" (1.727 m)   Wt 115.8 kg (255 lb 4.7 oz)   BMI 38.82 kg/m²     Assessment:  CHERRY, severe. Remains adherent with PAP, AHI<5. Benefits fromtherapy.Eligible new machine  HTN    PLan  continue autoCPAP 10-14 cm, GET new machine  See pcp re HTN mgt/continue meds  Rtc b/t 31-90d after new machine setup  Go to [facility] have smartcard hopefully obtain compliance report until get NEW RESMeD machine  "

## 2023-04-19 ENCOUNTER — TELEPHONE (OUTPATIENT)
Dept: SLEEP MEDICINE | Facility: CLINIC | Age: 53
End: 2023-04-19
Payer: COMMERCIAL

## 2023-04-19 NOTE — TELEPHONE ENCOUNTER
03/21/2023 - 04/19/2023  Usage days 23/30 days (77%)  >= 4 hours 19 days (63%)  < 4 hours 4 days (13%)  Usage hours 117 hours 25 minutes  Average usage (total days) 3 hours 55 minutes  Average usage (days used) 5 hours 6 minutes  Median usage (days used) 5 hours 3 minutes  S9 AutoSet  Serial number [id number]  Mode AutoSet  Min Pressure 10 cmH2O  Max Pressure 14 cmH2O  EPR Fulltime  EPR level 3  Therapy  Pressure - cmH2O Median: 11.1 95th percentile: 13.2 Maximum: 13.6  Leaks - L/min Median: 2.2 95th percentile: 30.2 Maximum: 43.9  Events per hour AI: 2.6 HI: 0.4 AHI: 3.0

## 2023-04-21 ENCOUNTER — PATIENT MESSAGE (OUTPATIENT)
Dept: ADMINISTRATIVE | Facility: HOSPITAL | Age: 53
End: 2023-04-21
Payer: COMMERCIAL

## 2023-06-02 ENCOUNTER — OFFICE VISIT (OUTPATIENT)
Dept: ORTHOPEDICS | Facility: CLINIC | Age: 53
End: 2023-06-02
Payer: COMMERCIAL

## 2023-06-02 ENCOUNTER — TELEPHONE (OUTPATIENT)
Dept: ORTHOPEDICS | Facility: CLINIC | Age: 53
End: 2023-06-02

## 2023-06-02 VITALS — BODY MASS INDEX: 39.53 KG/M2 | HEIGHT: 68 IN | WEIGHT: 260.81 LBS

## 2023-06-02 DIAGNOSIS — Z96.651 S/P TOTAL KNEE REPLACEMENT USING CEMENT, RIGHT: Primary | ICD-10-CM

## 2023-06-02 PROCEDURE — 99999 PR PBB SHADOW E&M-EST. PATIENT-LVL III: ICD-10-PCS | Mod: PBBFAC,,, | Performed by: ORTHOPAEDIC SURGERY

## 2023-06-02 PROCEDURE — 99213 OFFICE O/P EST LOW 20 MIN: CPT | Mod: S$GLB,,, | Performed by: ORTHOPAEDIC SURGERY

## 2023-06-02 PROCEDURE — 99999 PR PBB SHADOW E&M-EST. PATIENT-LVL III: CPT | Mod: PBBFAC,,, | Performed by: ORTHOPAEDIC SURGERY

## 2023-06-02 PROCEDURE — 99213 PR OFFICE/OUTPT VISIT, EST, LEVL III, 20-29 MIN: ICD-10-PCS | Mod: S$GLB,,, | Performed by: ORTHOPAEDIC SURGERY

## 2023-06-02 NOTE — PROGRESS NOTES
"  Subjective:     HPI:   Coy Rosado Jr. is a 52 y.o. 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  Exam:  Gait: limp/antalgic none     Incision: healed     Stability:  Knee stable anterior-posterior varus and valgus stresses  Extensor lag resolved, 0 deg     Extension: 0     Flexion: 115     Valgus angle: 5     Pre-op 0-120        Imaging:  None today      Assessment:       ICD-10-CM ICD-9-CM   1. S/P total knee replacement using cement, right  Z96.651 V43.65      Early post op ext lag - resolved     Plan:       Making good progress    Patient is doing very well with their total knee arthroplasty.  They will continue with their routine care of the knee replacement and see me back for their follow-up at the routine interval.  If there are problems in the interim they will see me back sooner.    6 month = 1 year    No orders of the defined types were placed in this encounter.            Past Medical History:   Diagnosis Date    Allergy     Diabetes mellitus, type 2     HTN (hypertension)     Low back pain        Past Surgical History:   Procedure Laterality Date    ARTHROPLASTY, KNEE, TOTAL, USING COMPUTER-ASSISTED NAVIGATION Right 12/5/2022    Procedure: ARTHROPLASTY, KNEE, TOTAL, USING COMPUTER-ASSISTED NAVIGATION: LURDES: RIGHT: MARTHA - NEAN;  Surgeon: Shane Wills III, MD;  Location: [facility];  Service: Orthopedics;  Laterality: Right;    COLONOSCOPY      leg laceration  1984       Family History   Problem Relation Age of Onset    Bone cancer Mother     Stroke Father     Diabetes type II Father     No Known Problems Sister     No Known Problems Brother     No Known Problems Brother     No Known Problems Daughter        Social History "     Socioeconomic History    Marital status:     Number of children: 1   Tobacco Use    Smoking status: Never     Passive exposure: Never    Smokeless tobacco: Never   Substance and Sexual Activity    Alcohol use: Never    Drug use: Never    Sexual activity: Yes

## 2023-07-25 ENCOUNTER — TELEPHONE (OUTPATIENT)
Dept: ORTHOPEDICS | Facility: CLINIC | Age: 53
End: 2023-07-25
Payer: COMMERCIAL

## 2023-07-25 NOTE — TELEPHONE ENCOUNTER
The patient is reporting pain behind his right knee. The patient had R TKA (12/5/22). The patient stated that he did a lot of walking yesterday and now his hamstrings are tight. Denies any falls, denies instability. The patient is going to rest today and ice and then is going to call us back if it doesn't get any better. The patient verbalized understanding and has no further questions.       ----- Message from Liliana Crain sent at 7/25/2023  9:03 AM CDT -----  Regarding: Same Day Appt Access  Contact: [phone]  SCHEDULING/REQUEST    Appt Type: Same Day Appt Access    Date/Time Preference: Today    Treating Provider: Darwin Wills    Caller Name: Coy Rosado    Contact Preference: [phone]    Comments/Notes: Pt is requesting to be seen today due to knee tightness and pain.

## 2023-07-26 DIAGNOSIS — Z96.651 S/P TOTAL KNEE REPLACEMENT USING CEMENT, RIGHT: ICD-10-CM

## 2023-07-26 RX ORDER — CELECOXIB 100 MG/1
CAPSULE ORAL
Qty: 90 CAPSULE | Refills: 0 | OUTPATIENT
Start: 2023-07-26

## 2023-07-27 NOTE — TELEPHONE ENCOUNTER
I called and let the patient know that his Rx has been refilled and also to let him know that any further refills will need to be with his pcp.    The patient verbalized understanding and has no further questions.

## 2023-07-28 NOTE — TELEPHONE ENCOUNTER
I called the patient to correct my call from yesterday and let him know that Dr. Wills would like him to get this refilled with his pcp since it has been more than 3 months postop.  The patient did not answer. I left a voicemail with a call back number.

## 2023-10-19 ENCOUNTER — LAB VISIT (OUTPATIENT)
Dept: LAB | Facility: OTHER | Age: 53
End: 2023-10-19
Attending: STUDENT IN AN ORGANIZED HEALTH CARE EDUCATION/TRAINING PROGRAM
Payer: COMMERCIAL

## 2023-10-19 ENCOUNTER — OFFICE VISIT (OUTPATIENT)
Dept: INTERNAL MEDICINE | Facility: CLINIC | Age: 53
End: 2023-10-19
Payer: COMMERCIAL

## 2023-10-19 VITALS
OXYGEN SATURATION: 99 % | HEIGHT: 68 IN | BODY MASS INDEX: 39.36 KG/M2 | DIASTOLIC BLOOD PRESSURE: 82 MMHG | HEART RATE: 82 BPM | WEIGHT: 259.69 LBS | SYSTOLIC BLOOD PRESSURE: 142 MMHG

## 2023-10-19 DIAGNOSIS — I10 HYPERTENSION, UNSPECIFIED TYPE: ICD-10-CM

## 2023-10-19 DIAGNOSIS — D64.9 ANEMIA, UNSPECIFIED TYPE: ICD-10-CM

## 2023-10-19 LAB
BASOPHILS # BLD AUTO: 0.04 K/UL (ref 0–0.2)
BASOPHILS NFR BLD: 0.6 % (ref 0–1.9)
DIFFERENTIAL METHOD: ABNORMAL
EOSINOPHIL # BLD AUTO: 0.1 K/UL (ref 0–0.5)
EOSINOPHIL NFR BLD: 1.4 % (ref 0–8)
ERYTHROCYTE [DISTWIDTH] IN BLOOD BY AUTOMATED COUNT: 12.4 % (ref 11.5–14.5)
FERRITIN SERPL-MCNC: 193 NG/ML (ref 20–300)
FOLATE SERPL-MCNC: 10.9 NG/ML (ref 4–24)
HCT VFR BLD AUTO: 42.2 % (ref 40–54)
HGB BLD-MCNC: 14.3 G/DL (ref 14–18)
IMM GRANULOCYTES # BLD AUTO: 0.04 K/UL (ref 0–0.04)
IMM GRANULOCYTES NFR BLD AUTO: 0.6 % (ref 0–0.5)
IRON SERPL-MCNC: 94 UG/DL (ref 45–160)
LYMPHOCYTES # BLD AUTO: 3.1 K/UL (ref 1–4.8)
LYMPHOCYTES NFR BLD: 44.6 % (ref 18–48)
MCH RBC QN AUTO: 30.3 PG (ref 27–31)
MCHC RBC AUTO-ENTMCNC: 33.9 G/DL (ref 32–36)
MCV RBC AUTO: 89 FL (ref 82–98)
MONOCYTES # BLD AUTO: 0.6 K/UL (ref 0.3–1)
MONOCYTES NFR BLD: 8.4 % (ref 4–15)
NEUTROPHILS # BLD AUTO: 3.1 K/UL (ref 1.8–7.7)
NEUTROPHILS NFR BLD: 44.4 % (ref 38–73)
NRBC BLD-RTO: 0 /100 WBC
PATH REV BLD -IMP: NORMAL
PLATELET # BLD AUTO: 267 K/UL (ref 150–450)
PMV BLD AUTO: 9.2 FL (ref 9.2–12.9)
RBC # BLD AUTO: 4.72 M/UL (ref 4.6–6.2)
RETICS/RBC NFR AUTO: 1.3 % (ref 0.4–2)
SATURATED IRON: 29 % (ref 20–50)
TOTAL IRON BINDING CAPACITY: 327 UG/DL (ref 250–450)
TRANSFERRIN SERPL-MCNC: 221 MG/DL (ref 200–375)
VIT B12 SERPL-MCNC: 334 PG/ML (ref 210–950)
WBC # BLD AUTO: 6.91 K/UL (ref 3.9–12.7)

## 2023-10-19 PROCEDURE — 83540 ASSAY OF IRON: CPT | Performed by: STUDENT IN AN ORGANIZED HEALTH CARE EDUCATION/TRAINING PROGRAM

## 2023-10-19 PROCEDURE — 82728 ASSAY OF FERRITIN: CPT | Performed by: STUDENT IN AN ORGANIZED HEALTH CARE EDUCATION/TRAINING PROGRAM

## 2023-10-19 PROCEDURE — 99214 PR OFFICE/OUTPT VISIT, EST, LEVL IV, 30-39 MIN: ICD-10-PCS | Mod: S$GLB,,, | Performed by: PHYSICIAN ASSISTANT

## 2023-10-19 PROCEDURE — 99999 PR PBB SHADOW E&M-EST. PATIENT-LVL III: ICD-10-PCS | Mod: PBBFAC,,, | Performed by: PHYSICIAN ASSISTANT

## 2023-10-19 PROCEDURE — 99214 OFFICE O/P EST MOD 30 MIN: CPT | Mod: S$GLB,,, | Performed by: PHYSICIAN ASSISTANT

## 2023-10-19 PROCEDURE — 84466 ASSAY OF TRANSFERRIN: CPT | Performed by: STUDENT IN AN ORGANIZED HEALTH CARE EDUCATION/TRAINING PROGRAM

## 2023-10-19 PROCEDURE — 82607 VITAMIN B-12: CPT | Performed by: STUDENT IN AN ORGANIZED HEALTH CARE EDUCATION/TRAINING PROGRAM

## 2023-10-19 PROCEDURE — 85045 AUTOMATED RETICULOCYTE COUNT: CPT | Performed by: STUDENT IN AN ORGANIZED HEALTH CARE EDUCATION/TRAINING PROGRAM

## 2023-10-19 PROCEDURE — 99999 PR PBB SHADOW E&M-EST. PATIENT-LVL III: CPT | Mod: PBBFAC,,, | Performed by: PHYSICIAN ASSISTANT

## 2023-10-19 PROCEDURE — 85060 PATHOLOGIST REVIEW: ICD-10-PCS | Mod: ,,, | Performed by: PATHOLOGY

## 2023-10-19 PROCEDURE — 85060 BLOOD SMEAR INTERPRETATION: CPT | Mod: ,,, | Performed by: PATHOLOGY

## 2023-10-19 PROCEDURE — 85025 COMPLETE CBC W/AUTO DIFF WBC: CPT | Performed by: STUDENT IN AN ORGANIZED HEALTH CARE EDUCATION/TRAINING PROGRAM

## 2023-10-19 PROCEDURE — 36415 COLL VENOUS BLD VENIPUNCTURE: CPT | Performed by: STUDENT IN AN ORGANIZED HEALTH CARE EDUCATION/TRAINING PROGRAM

## 2023-10-19 PROCEDURE — 82746 ASSAY OF FOLIC ACID SERUM: CPT | Performed by: STUDENT IN AN ORGANIZED HEALTH CARE EDUCATION/TRAINING PROGRAM

## 2023-10-19 RX ORDER — LOSARTAN POTASSIUM 100 MG/1
100 TABLET ORAL DAILY
Qty: 90 TABLET | Refills: 3 | Status: SHIPPED | OUTPATIENT
Start: 2023-10-19 | End: 2024-10-18

## 2023-10-19 NOTE — PROGRESS NOTES
INTERNAL MEDICINE PROGRESS NOTE    CHIEF COMPLAINT     Chief Complaint   Patient presents with    Hypertension     check       HPI     Coy Rosado Jr. is a 52 y.o. male who presents for a follow-up visit today.    PCP is Shorty Weiss MD, patient is new to me.     Here for BP follow-up.   Taking losartan 50 mg daily, BP elevated at home  Causing HA  Will increase losartan to 100 mg daily   Will see patient back in 2 weeks for BP check     Due for labs ordered by Jaxon earlier this year - to evaluate for anemia.     Past Medical History:  Past Medical History:   Diagnosis Date    Allergy     Diabetes mellitus, type 2     HTN (hypertension)     Low back pain        Home Medications:  Prior to Admission medications    Medication Sig Start Date End Date Taking? Authorizing Provider   celecoxib (CELEBREX) 100 MG capsule Take 1 capsule (100 mg total) by mouth once daily. Additional refills should come from primary care doctor 3/2/23   Shane Wills III, MD   losartan (COZAAR) 50 MG tablet Take 1 tablet (50 mg total) by mouth once daily. 4/17/23 4/16/24  Jessica Coates MD   omega-3 fatty acids/fish oil (FISH OIL-OMEGA-3 FATTY ACIDS) 300-1,000 mg capsule Take by mouth once daily.    Provider, Historical       Review of Systems:  Review of Systems   Constitutional:  Negative for chills and fever.   HENT:  Negative for sore throat and trouble swallowing.    Eyes:  Negative for visual disturbance.   Respiratory:  Negative for cough and shortness of breath.    Cardiovascular:  Negative for chest pain.   Gastrointestinal:  Negative for abdominal pain, constipation, diarrhea, nausea and vomiting.   Genitourinary:  Negative for dysuria and flank pain.   Musculoskeletal:  Negative for back pain, neck pain and neck stiffness.   Skin:  Negative for rash.   Neurological:  Positive for headaches. Negative for dizziness, syncope and weakness.   Psychiatric/Behavioral:  Negative for confusion.        Health Maintainence:  "  Immunizations:  Health Maintenance         Date Due Completion Date    Colorectal Cancer Screening Never done ---    Shingles Vaccine (1 of 2) Never done ---    Influenza Vaccine (1) 09/01/2023 9/29/2020    COVID-19 Vaccine (4 - 2023-24 season) 09/01/2023 1/7/2022    Hemoglobin A1c (Diabetic Prevention Screening) 04/17/2026 4/17/2023    Lipid Panel 04/17/2028 4/17/2023    TETANUS VACCINE 09/29/2030 9/29/2020             PHYSICAL EXAM     BP (!) 142/82 (BP Location: Left arm, Patient Position: Sitting, BP Method: Large (Manual))   Pulse 82   Ht 5' 8" (1.727 m)   Wt 117.8 kg (259 lb 11.2 oz)   SpO2 99%   BMI 39.49 kg/m²     Physical Exam  Vitals and nursing note reviewed.   Constitutional:       Appearance: Normal appearance.      Comments: Healthy appearing male in NAD or apparent pain. He makes good eye contact, speaks in clear full sentences and ambulates with ease.        HENT:      Head: Normocephalic and atraumatic.      Nose: Nose normal.      Mouth/Throat:      Pharynx: Oropharynx is clear.   Eyes:      Conjunctiva/sclera: Conjunctivae normal.   Cardiovascular:      Rate and Rhythm: Normal rate and regular rhythm.      Pulses: Normal pulses.   Pulmonary:      Effort: No respiratory distress.   Abdominal:      Tenderness: There is no abdominal tenderness.   Musculoskeletal:         General: Normal range of motion.      Cervical back: No rigidity.   Skin:     General: Skin is warm and dry.      Capillary Refill: Capillary refill takes less than 2 seconds.      Findings: No rash.   Neurological:      General: No focal deficit present.      Mental Status: He is alert.      Gait: Gait normal.   Psychiatric:         Mood and Affect: Mood normal.         LABS     Lab Results   Component Value Date    HGBA1C 5.9 (H) 04/17/2023     CMP  Sodium   Date Value Ref Range Status   04/17/2023 142 136 - 145 mmol/L Final     Potassium   Date Value Ref Range Status   04/17/2023 4.3 3.5 - 5.1 mmol/L Final     Chloride "   Date Value Ref Range Status   04/17/2023 106 95 - 110 mmol/L Final     CO2   Date Value Ref Range Status   04/17/2023 26 23 - 29 mmol/L Final     Glucose   Date Value Ref Range Status   04/17/2023 84 70 - 110 mg/dL Final     BUN   Date Value Ref Range Status   04/17/2023 16 6 - 20 mg/dL Final     Creatinine   Date Value Ref Range Status   04/17/2023 0.9 0.5 - 1.4 mg/dL Final     Calcium   Date Value Ref Range Status   04/17/2023 9.7 8.7 - 10.5 mg/dL Final     Total Protein   Date Value Ref Range Status   04/17/2023 7.7 6.0 - 8.4 g/dL Final     Albumin   Date Value Ref Range Status   04/17/2023 3.9 3.5 - 5.2 g/dL Final     Total Bilirubin   Date Value Ref Range Status   04/17/2023 0.5 0.1 - 1.0 mg/dL Final     Comment:     For infants and newborns, interpretation of results should be based  on gestational age, weight and in agreement with clinical  observations.    Premature Infant recommended reference ranges:  Up to 24 hours.............<8.0 mg/dL  Up to 48 hours............<12.0 mg/dL  3-5 days..................<15.0 mg/dL  6-29 days.................<15.0 mg/dL       Alkaline Phosphatase   Date Value Ref Range Status   04/17/2023 100 55 - 135 U/L Final     AST   Date Value Ref Range Status   04/17/2023 11 10 - 40 U/L Final     ALT   Date Value Ref Range Status   04/17/2023 17 10 - 44 U/L Final     Anion Gap   Date Value Ref Range Status   04/17/2023 10 8 - 16 mmol/L Final     eGFR if    Date Value Ref Range Status   03/01/2021 >60.0 >60 mL/min/1.73 m^2 Final     eGFR if non    Date Value Ref Range Status   03/01/2021 >60.0 >60 mL/min/1.73 m^2 Final     Comment:     Calculation used to obtain the estimated glomerular filtration  rate (eGFR) is the CKD-EPI equation.        Lab Results   Component Value Date    WBC 7.62 04/17/2023    HGB 13.7 (L) 04/17/2023    HCT 41.8 04/17/2023    MCV 92 04/17/2023     04/17/2023     Lab Results   Component Value Date    CHOL 134 04/17/2023      Lab Results   Component Value Date    HDL 34 (L) 04/17/2023     Lab Results   Component Value Date    LDLCALC 84.0 04/17/2023     Lab Results   Component Value Date    TRIG 80 04/17/2023     Lab Results   Component Value Date    CHOLHDL 25.4 04/17/2023     Lab Results   Component Value Date    TSH 1.215 04/17/2023       ASSESSMENT/PLAN     Coy Rosado Jr. is a 52 y.o. male     Coy was seen today for hypertension -will increase losartan from 50 to 100 mg daily. Will RTC in 2 weeks for BP check. He is aware of and amenable to plan.     Diagnoses and all orders for this visit:    Hypertension, unspecified type  -     losartan (COZAAR) 100 MG tablet; Take 1 tablet (100 mg total) by mouth once daily.        Smita Hawkins PA-C   Department of Internal Medicine - [facility]   11:37 AM

## 2023-10-20 LAB — PATH REV BLD -IMP: NORMAL

## 2023-11-02 ENCOUNTER — OFFICE VISIT (OUTPATIENT)
Dept: INTERNAL MEDICINE | Facility: CLINIC | Age: 53
End: 2023-11-02
Payer: COMMERCIAL

## 2023-11-02 VITALS
WEIGHT: 259.94 LBS | HEART RATE: 83 BPM | OXYGEN SATURATION: 98 % | HEIGHT: 68 IN | SYSTOLIC BLOOD PRESSURE: 124 MMHG | BODY MASS INDEX: 39.4 KG/M2 | DIASTOLIC BLOOD PRESSURE: 90 MMHG

## 2023-11-02 DIAGNOSIS — I10 HYPERTENSION, UNSPECIFIED TYPE: Primary | ICD-10-CM

## 2023-11-02 PROCEDURE — 99999 PR PBB SHADOW E&M-EST. PATIENT-LVL IV: ICD-10-PCS | Mod: PBBFAC,,, | Performed by: PHYSICIAN ASSISTANT

## 2023-11-02 PROCEDURE — 99213 PR OFFICE/OUTPT VISIT, EST, LEVL III, 20-29 MIN: ICD-10-PCS | Mod: S$GLB,,, | Performed by: PHYSICIAN ASSISTANT

## 2023-11-02 PROCEDURE — 99213 OFFICE O/P EST LOW 20 MIN: CPT | Mod: S$GLB,,, | Performed by: PHYSICIAN ASSISTANT

## 2023-11-02 PROCEDURE — 99999 PR PBB SHADOW E&M-EST. PATIENT-LVL IV: CPT | Mod: PBBFAC,,, | Performed by: PHYSICIAN ASSISTANT

## 2023-11-02 NOTE — PROGRESS NOTES
INTERNAL MEDICINE PROGRESS NOTE    CHIEF COMPLAINT     Chief Complaint   Patient presents with    Follow-up     2 wk BP       HPI     Coy Rosado Jr. is a 52 y.o. male who presents for a follow-up visit today.    PCP is Shorty Weiss MD, patient is known to me.     Patient presents for BP follow-up.   At last OV two weeks ago losartan was increased from 50 mg daily to 100 mg daily.   He has been compliant with this regimen   He has noticed in the past two days, BP has been improving  He denies any chest pain, sob, nausea, vomiting, ha, dizziness.   He denies any problems with medication dosing.   He is alone in office today.       Past Medical History:  Past Medical History:   Diagnosis Date    Allergy     Diabetes mellitus, type 2     HTN (hypertension)     Low back pain        Home Medications:  Prior to Admission medications    Medication Sig Start Date End Date Taking? Authorizing Provider   celecoxib (CELEBREX) 100 MG capsule Take 1 capsule (100 mg total) by mouth once daily. Additional refills should come from primary care doctor 3/2/23  Yes Shane Wills III, MD   losartan (COZAAR) 100 MG tablet Take 1 tablet (100 mg total) by mouth once daily. 10/19/23 10/18/24 Yes Smita Hawkins PA-C   omega-3 fatty acids/fish oil (FISH OIL-OMEGA-3 FATTY ACIDS) 300-1,000 mg capsule Take by mouth once daily.   Yes Provider, Historical       Review of Systems:  Review of Systems   Constitutional:  Negative for chills and fever.   HENT:  Negative for sore throat and trouble swallowing.    Eyes:  Negative for visual disturbance.   Respiratory:  Negative for cough and shortness of breath.    Cardiovascular:  Negative for chest pain.   Gastrointestinal:  Negative for abdominal pain, constipation, diarrhea, nausea and vomiting.   Genitourinary:  Negative for dysuria and flank pain.   Musculoskeletal:  Negative for back pain, neck pain and neck stiffness.   Skin:  Negative for rash.   Neurological:  Negative for  "dizziness, syncope, weakness and headaches.   Psychiatric/Behavioral:  Negative for confusion.        Health Maintainence:   Immunizations:  Health Maintenance         Date Due Completion Date    Colorectal Cancer Screening Never done ---    Shingles Vaccine (1 of 2) Never done ---    Influenza Vaccine (1) 09/01/2023 9/29/2020    COVID-19 Vaccine (4 - 2023-24 season) 09/01/2023 1/7/2022    Hemoglobin A1c (Diabetic Prevention Screening) 04/17/2026 4/17/2023    Lipid Panel 04/17/2028 4/17/2023    TETANUS VACCINE 09/29/2030 9/29/2020             PHYSICAL EXAM     BP (!) 138/104 (BP Location: Left arm, Patient Position: Sitting, BP Method: Large (Manual))   Pulse 83   Ht 5' 8" (1.727 m)   Wt 117.9 kg (259 lb 14.8 oz)   SpO2 98%   BMI 39.52 kg/m²     Physical Exam  Vitals and nursing note reviewed.   Constitutional:       Appearance: Normal appearance.      Comments: Healthy appearing male in NAD or apparent pain. He makes good eye contact, speaks in clear full sentences and ambulates with ease.        HENT:      Head: Normocephalic and atraumatic.      Nose: Nose normal.      Mouth/Throat:      Pharynx: Oropharynx is clear.   Eyes:      Conjunctiva/sclera: Conjunctivae normal.   Cardiovascular:      Rate and Rhythm: Normal rate and regular rhythm.      Pulses: Normal pulses.   Pulmonary:      Effort: No respiratory distress.   Abdominal:      Tenderness: There is no abdominal tenderness.   Musculoskeletal:         General: Normal range of motion.      Cervical back: No rigidity.   Skin:     General: Skin is warm and dry.      Capillary Refill: Capillary refill takes less than 2 seconds.      Findings: No rash.   Neurological:      General: No focal deficit present.      Mental Status: He is alert.      Gait: Gait normal.   Psychiatric:         Mood and Affect: Mood normal.         LABS     Lab Results   Component Value Date    HGBA1C 5.9 (H) 04/17/2023     CMP  Sodium   Date Value Ref Range Status   04/17/2023 142 " 136 - 145 mmol/L Final     Potassium   Date Value Ref Range Status   04/17/2023 4.3 3.5 - 5.1 mmol/L Final     Chloride   Date Value Ref Range Status   04/17/2023 106 95 - 110 mmol/L Final     CO2   Date Value Ref Range Status   04/17/2023 26 23 - 29 mmol/L Final     Glucose   Date Value Ref Range Status   04/17/2023 84 70 - 110 mg/dL Final     BUN   Date Value Ref Range Status   04/17/2023 16 6 - 20 mg/dL Final     Creatinine   Date Value Ref Range Status   04/17/2023 0.9 0.5 - 1.4 mg/dL Final     Calcium   Date Value Ref Range Status   04/17/2023 9.7 8.7 - 10.5 mg/dL Final     Total Protein   Date Value Ref Range Status   04/17/2023 7.7 6.0 - 8.4 g/dL Final     Albumin   Date Value Ref Range Status   04/17/2023 3.9 3.5 - 5.2 g/dL Final     Total Bilirubin   Date Value Ref Range Status   04/17/2023 0.5 0.1 - 1.0 mg/dL Final     Comment:     For infants and newborns, interpretation of results should be based  on gestational age, weight and in agreement with clinical  observations.    Premature Infant recommended reference ranges:  Up to 24 hours.............<8.0 mg/dL  Up to 48 hours............<12.0 mg/dL  3-5 days..................<15.0 mg/dL  6-29 days.................<15.0 mg/dL       Alkaline Phosphatase   Date Value Ref Range Status   04/17/2023 100 55 - 135 U/L Final     AST   Date Value Ref Range Status   04/17/2023 11 10 - 40 U/L Final     ALT   Date Value Ref Range Status   04/17/2023 17 10 - 44 U/L Final     Anion Gap   Date Value Ref Range Status   04/17/2023 10 8 - 16 mmol/L Final     eGFR if    Date Value Ref Range Status   03/01/2021 >60.0 >60 mL/min/1.73 m^2 Final     eGFR if non    Date Value Ref Range Status   03/01/2021 >60.0 >60 mL/min/1.73 m^2 Final     Comment:     Calculation used to obtain the estimated glomerular filtration  rate (eGFR) is the CKD-EPI equation.        Lab Results   Component Value Date    WBC 6.91 10/19/2023    HGB 14.3 10/19/2023    HCT 42.2  10/19/2023    MCV 89 10/19/2023     10/19/2023     Lab Results   Component Value Date    CHOL 134 04/17/2023     Lab Results   Component Value Date    HDL 34 (L) 04/17/2023     Lab Results   Component Value Date    LDLCALC 84.0 04/17/2023     Lab Results   Component Value Date    TRIG 80 04/17/2023     Lab Results   Component Value Date    CHOLHDL 25.4 04/17/2023     Lab Results   Component Value Date    TSH 1.215 04/17/2023       ASSESSMENT/PLAN     Coy Rosado JrRenata is a 52 y.o. male     Coy was seen today for follow-up. Continue current medication regimen, check on BP again in 2 weeks , discussed decreasing dietary Na and increasing CV exercise as tolerated. Will consider adding low dose HCTX at next OV if BP still not optimized.     Diagnoses and all orders for this visit:    Hypertension, unspecified type        Smita Hawkins PA-C   Department of Internal Medicine - [facility]   2:05 PM

## 2023-11-04 ENCOUNTER — PATIENT MESSAGE (OUTPATIENT)
Dept: ADMINISTRATIVE | Facility: HOSPITAL | Age: 53
End: 2023-11-04
Payer: COMMERCIAL

## 2023-11-06 ENCOUNTER — PATIENT MESSAGE (OUTPATIENT)
Dept: ADMINISTRATIVE | Facility: HOSPITAL | Age: 53
End: 2023-11-06
Payer: COMMERCIAL

## 2023-11-21 ENCOUNTER — OFFICE VISIT (OUTPATIENT)
Dept: INTERNAL MEDICINE | Facility: CLINIC | Age: 53
End: 2023-11-21
Payer: COMMERCIAL

## 2023-11-21 DIAGNOSIS — I10 HYPERTENSION, UNSPECIFIED TYPE: Primary | ICD-10-CM

## 2023-11-21 PROCEDURE — 99443 PR PHYSICIAN TELEPHONE EVALUATION 21-30 MIN: ICD-10-PCS | Mod: 95,,, | Performed by: PHYSICIAN ASSISTANT

## 2023-11-21 PROCEDURE — 99443 PR PHYSICIAN TELEPHONE EVALUATION 21-30 MIN: CPT | Mod: 95,,, | Performed by: PHYSICIAN ASSISTANT

## 2023-11-21 RX ORDER — HYDROCHLOROTHIAZIDE 12.5 MG/1
12.5 TABLET ORAL DAILY
Qty: 30 TABLET | Refills: 11 | Status: SHIPPED | OUTPATIENT
Start: 2023-11-21 | End: 2024-11-20

## 2023-11-21 NOTE — PROGRESS NOTES
Established Patient - Audio Only Telehealth Visit     The patient location is: home, [place]   The chief complaint leading to consultation is: BP   Visit type: Virtual visit with audio only (telephone)  Total time spent with patient: 35       The reason for the audio only service rather than synchronous audio and video virtual visit was related to technical difficulties or patient preference/necessity.     Each patient to whom I provide medical services by telemedicine is:  (1) informed of the relationship between the physician and patient and the respective role of any other health care provider with respect to management of the patient; and (2) notified that they may decline to receive medical services by telemedicine and may withdraw from such care at any time. Patient verbally consented to receive this service via voice-only telephone call.       HPI: Patient presents for BP follow-up. He is feeling well and has noticed that AM BP readings at home are still in 140's-150. He denies chest pain or SOB. He has been taking losartan 100 daily. He admits he is concerned that BP is still on high side.      Assessment and plan:         Diagnoses and all orders for this visit:    Hypertension, unspecified type   -BP still not optimized, will add HCTZ low dose and have patient continue to monitor BP. RTC in 2-3 weeks for BP check.     Other orders  -     hydroCHLOROthiazide (HYDRODIURIL) 12.5 MG Tab; Take 1 tablet (12.5 mg total) by mouth once daily.      [facility]  4:14 PM          This service was not originating from a related E/M service provided within the previous 7 days nor will  to an E/M service or procedure within the next 24 hours or my soonest available appointment.  Prevailing standard of care was able to be met in this audio-only visit.

## 2024-02-06 DIAGNOSIS — Z12.11 COLON CANCER SCREENING: ICD-10-CM

## 2024-02-22 ENCOUNTER — PATIENT MESSAGE (OUTPATIENT)
Dept: INTERNAL MEDICINE | Facility: CLINIC | Age: 54
End: 2024-02-22
Payer: COMMERCIAL

## 2024-03-04 ENCOUNTER — PATIENT MESSAGE (OUTPATIENT)
Dept: ADMINISTRATIVE | Facility: HOSPITAL | Age: 54
End: 2024-03-04
Payer: COMMERCIAL

## 2024-03-26 DIAGNOSIS — Z96.659 STATUS POST KNEE REPLACEMENT, UNSPECIFIED LATERALITY: Primary | ICD-10-CM

## 2024-04-01 ENCOUNTER — HOSPITAL ENCOUNTER (OUTPATIENT)
Dept: RADIOLOGY | Facility: HOSPITAL | Age: 54
Discharge: HOME | End: 2024-04-01
Attending: NURSE PRACTITIONER
Payer: COMMERCIAL

## 2024-04-01 ENCOUNTER — OFFICE VISIT (OUTPATIENT)
Dept: ORTHOPEDICS | Facility: CLINIC | Age: 54
End: 2024-04-01
Payer: COMMERCIAL

## 2024-04-01 VITALS — HEIGHT: 68 IN | WEIGHT: 263.56 LBS | BODY MASS INDEX: 39.94 KG/M2

## 2024-04-01 DIAGNOSIS — M25.561 ACUTE PAIN OF RIGHT KNEE: Primary | ICD-10-CM

## 2024-04-01 DIAGNOSIS — Z96.659 STATUS POST KNEE REPLACEMENT, UNSPECIFIED LATERALITY: ICD-10-CM

## 2024-04-01 PROCEDURE — 73560 X-RAY EXAM OF KNEE 1 OR 2: CPT | Mod: 59,TC,LT

## 2024-04-01 PROCEDURE — 73560 X-RAY EXAM OF KNEE 1 OR 2: CPT | Mod: 26,59,LT, | Performed by: RADIOLOGY

## 2024-04-01 PROCEDURE — 99213 OFFICE O/P EST LOW 20 MIN: CPT | Mod: S$GLB,,, | Performed by: NURSE PRACTITIONER

## 2024-04-01 PROCEDURE — 73562 X-RAY EXAM OF KNEE 3: CPT | Mod: TC,RT

## 2024-04-01 PROCEDURE — 73562 X-RAY EXAM OF KNEE 3: CPT | Mod: 26,RT,, | Performed by: RADIOLOGY

## 2024-04-01 PROCEDURE — 99999 PR PBB SHADOW E&M-EST. PATIENT-LVL III: CPT | Mod: PBBFAC,,, | Performed by: NURSE PRACTITIONER

## 2024-04-01 NOTE — PROGRESS NOTES
CC: Pain of the Right Knee      HPI: Pt with c/o right knee pain and stiffness for the past several weeks. The pain is worse with increased activity and with trying to turn in bed. The pain is burning and aching. He has tried celebrex, ice and rest with minimal relief. He had the right knee replaced in December 2022 by Dr. Wills. When he sits for extended periods of time, he has stiffness and has to stand for a minute before he can start moving. He denies any recent infections. There is not warmth or erythema of the knee, but it does seem larger than the other knee to him. He is ambulating without assistive device. There is not a limp.    ROS  General: denies fever and chills  Resp: no c/o sob  CVS: no c/o cp  MSK: c/o right knee pain    PE  General: AAOx3, pleasant and cooperative  Resp: respirations even and unlabored  MSK: right knee exam  0 degrees extension  120 degrees flexion  No warmth or erythema   - effusion  - tenderness over the joint line  5/5 quad strength    Xray:  Reviewed by me with the patient: prosthesis in proper position and alignment    Assessment:  Right knee pain    Plan:  Crp and esr today  If normal, will prescribe a dose pack  If elevated will aspirate  F/u results by phone

## 2024-04-02 ENCOUNTER — OFFICE VISIT (OUTPATIENT)
Dept: ORTHOPEDICS | Facility: CLINIC | Age: 54
End: 2024-04-02
Payer: COMMERCIAL

## 2024-04-02 DIAGNOSIS — M25.561 ACUTE PAIN OF RIGHT KNEE: Primary | ICD-10-CM

## 2024-04-02 PROCEDURE — 99999 PR PBB SHADOW E&M-EST. PATIENT-LVL III: CPT | Mod: PBBFAC,,, | Performed by: NURSE PRACTITIONER

## 2024-04-02 PROCEDURE — 20610 DRAIN/INJ JOINT/BURSA W/O US: CPT | Mod: RT,S$GLB,, | Performed by: NURSE PRACTITIONER

## 2024-04-02 PROCEDURE — 99499 UNLISTED E&M SERVICE: CPT | Mod: S$GLB,,, | Performed by: NURSE PRACTITIONER

## 2024-04-02 NOTE — PROGRESS NOTES
Pt with persistent right knee pain. History of right tka by Dr. Wills 12/5/2022. Crp 10.8, esr 21. Discussed with Dr. Wills and patient returns today for aspiration of the right knee.         Knee Arthrocentesis with Injection Procedure Note    Pre-operative Diagnosis: right knee effusion    Post-operative Diagnosis: same    Indications: right knee pain    Anesthesia: none    Procedure Details     Verbal consent was obtained for the procedure.An 18 gauge needle was inserted into the superior aspect of the joint from a lateral approach. 20 ml of cloudy yellow fluid was removed from the joint and sent to the lab for analysis.  The needle was removed and the area cleansed and dressed.    Complications:  None; patient tolerated the procedure well.

## 2024-04-04 ENCOUNTER — TELEPHONE (OUTPATIENT)
Dept: ORTHOPEDICS | Facility: CLINIC | Age: 54
End: 2024-04-04
Payer: COMMERCIAL

## 2024-04-04 RX ORDER — METHYLPREDNISOLONE 4 MG/1
TABLET ORAL
Qty: 1 EACH | Refills: 0 | Status: SHIPPED | OUTPATIENT
Start: 2024-04-04 | End: 2024-04-25

## 2024-04-04 NOTE — TELEPHONE ENCOUNTER
I called the patient today regarding his appointment. I left a message stating that per Dr. Wills his preliminary synovasure results were normal. Zoraida Monae sent in a medrol dose pack for him and I scheduled him for an appointment in April. I left a message for the patient to call me back. I left my name and phone number.

## 2024-04-08 ENCOUNTER — PATIENT OUTREACH (OUTPATIENT)
Dept: ADMINISTRATIVE | Facility: HOSPITAL | Age: 54
End: 2024-04-08
Payer: COMMERCIAL

## 2024-04-08 ENCOUNTER — PATIENT MESSAGE (OUTPATIENT)
Dept: ADMINISTRATIVE | Facility: HOSPITAL | Age: 54
End: 2024-04-08
Payer: COMMERCIAL

## 2024-04-23 ENCOUNTER — OFFICE VISIT (OUTPATIENT)
Dept: ORTHOPEDICS | Facility: CLINIC | Age: 54
End: 2024-04-23
Payer: COMMERCIAL

## 2024-04-23 VITALS — BODY MASS INDEX: 37.22 KG/M2 | HEIGHT: 70 IN | WEIGHT: 260 LBS

## 2024-04-23 DIAGNOSIS — Z96.651 S/P TOTAL KNEE REPLACEMENT USING CEMENT, RIGHT: Primary | ICD-10-CM

## 2024-04-23 PROCEDURE — 99999 PR PBB SHADOW E&M-EST. PATIENT-LVL III: CPT | Mod: PBBFAC,,, | Performed by: ORTHOPAEDIC SURGERY

## 2024-04-23 PROCEDURE — 99213 OFFICE O/P EST LOW 20 MIN: CPT | Mod: S$GLB,,, | Performed by: ORTHOPAEDIC SURGERY

## 2024-04-23 NOTE — PROGRESS NOTES
"  Subjective:     HPI:   Coy Rosado Jr. is a 53 y.o. male who presents for rpt eval R TKA    R LURDES TKA 12/5/22, early post op ext lag    Saw SB 4/1/24: "Pt with c/o right knee pain and stiffness for the past several weeks. The pain is worse with increased activity and with trying to turn in bed. The pain is burning and aching. He has tried celebrex, ice and rest with minimal relief.  "    Labs 4/1/24: CRP 10.8, ESR 21  Knee aspirated 4/2/24: synovasure neg, , 33.9 PMN, crystal/microbial ID/culture all negative    Rx'd medrol dose pack - 50% relief    History of Present Illness  The patient presents for evaluation of knee stiffness and soreness.    The patient was last evaluated by Zoraida on 04/01/2024, during which he experienced knee stiffness and soreness for a duration of two weeks. 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 He has abstained from Celebrex since the initiation of steroids. He reports a 50 percent reduction in his symptoms following the administration of the steroid Dosepak. Currently, he reports no pain.    -doing a lot of heavy machinery/bending - increased activity over the past few months       Objective:   Body mass index is 37.84 kg/m².  Exam:    Physical Exam  The patient is walking well, with no limp, nonantalgic gait, and a negative Trendelenburg. His knee range of motion is 0 to 110 degrees, with 5 degrees of valgus alignment. The knee is stable to anterior, posterior, varus and valgus stresses without flexion contracture or extensor lag. Specifically, he has 5 out of 5 quad strength today, good extension strength, and no extensor lag. There is no significant effusion in the knee. The incision on the knee is well healed. The medial, lateral, and patellofemoral joint lines are not tender to touch. There is no pain during range of motion or palpation today, but he points to his patellar tendon area as " the area where he was most symptomatic. When I palpate his patellar tendon, he says that is where it did hurt when he was having more pain.    Pre-op 0-120   6 months 0-115, no lag    Imaging:    Results  Laboratory Studies  Inflammatory markers: One of them was barely borderline, the other one was normal.    Imaging  Knee aspiration results were as normal. X-rays show no shifting, moving, changing, loosening, or displacement.    No change from prior, stable      Assessment/Plan:     No diagnosis found.     TKA mechanically ok  Soft tissue irritation and/or patellar tendonitis from increased activity    Assessment/Plan:     Assessment & Plan  1. Potential patellar tendinitis.  The patient's knee appears to be in a satisfactory condition, as evidenced by his radiographic and mechanical assessments. The patient is advised to recommence his Celebrex regimen.    Resume celebrex, can add tylenol  PRN  Try patellar tendon strap  Act mod    F/u if worse    No orders of the defined types were placed in this encounter.      This note was generated with the assistance of ambient listening technology. Verbal consent was obtained by the patient and accompanying visitor(s) for the recording of patient appointment to facilitate this note. I attest to having reviewed and edited the generated note for accuracy, though some syntax or spelling errors may persist. Please contact the author of this note for any clarification.            Past Medical History:   Diagnosis Date    Allergy     Diabetes mellitus, type 2     HTN (hypertension)     Low back pain        Past Surgical History:   Procedure Laterality Date    ARTHROPLASTY, KNEE, TOTAL, USING COMPUTER-ASSISTED NAVIGATION Right 12/5/2022    Procedure: ARTHROPLASTY, KNEE, TOTAL, USING COMPUTER-ASSISTED NAVIGATION: LURDES: RIGHT: MARTHA BARRETT;  Surgeon: Shane Wills III, MD;  Location: [facility];  Service: Orthopedics;  Laterality: Right;    COLONOSCOPY      leg laceration   1984       Family History   Problem Relation Name Age of Onset    Bone cancer Mother      Stroke Father      Diabetes type II Father      No Known Problems Sister      No Known Problems Brother      No Known Problems Brother      No Known Problems Daughter         Social History     Socioeconomic History    Marital status:     Number of children: 1   Tobacco Use    Smoking status: Never     Passive exposure: Never    Smokeless tobacco: Never   Substance and Sexual Activity    Alcohol use: Never    Drug use: Never    Sexual activity: Yes

## 2024-06-27 ENCOUNTER — OFFICE VISIT (OUTPATIENT)
Dept: INTERNAL MEDICINE | Facility: CLINIC | Age: 54
End: 2024-06-27
Payer: COMMERCIAL

## 2024-06-27 VITALS
OXYGEN SATURATION: 98 % | SYSTOLIC BLOOD PRESSURE: 132 MMHG | DIASTOLIC BLOOD PRESSURE: 86 MMHG | HEART RATE: 71 BPM | WEIGHT: 268.75 LBS | BODY MASS INDEX: 39.12 KG/M2

## 2024-06-27 DIAGNOSIS — R73.03 PREDIABETES: ICD-10-CM

## 2024-06-27 DIAGNOSIS — Z00.00 HEALTH MAINTENANCE EXAMINATION: Primary | ICD-10-CM

## 2024-06-27 DIAGNOSIS — Z12.11 SCREENING FOR COLORECTAL CANCER: ICD-10-CM

## 2024-06-27 DIAGNOSIS — G47.33 OSA (OBSTRUCTIVE SLEEP APNEA): ICD-10-CM

## 2024-06-27 DIAGNOSIS — Z91.89 FRAMINGHAM CARDIAC RISK 10-20% IN NEXT 10 YEARS: ICD-10-CM

## 2024-06-27 DIAGNOSIS — Z12.12 SCREENING FOR COLORECTAL CANCER: ICD-10-CM

## 2024-06-27 DIAGNOSIS — I10 HYPERTENSION, UNSPECIFIED TYPE: ICD-10-CM

## 2024-06-27 DIAGNOSIS — E53.8 VITAMIN B12 DEFICIENCY: ICD-10-CM

## 2024-06-27 PROCEDURE — 99999 PR PBB SHADOW E&M-EST. PATIENT-LVL IV: CPT | Mod: PBBFAC,,, | Performed by: STUDENT IN AN ORGANIZED HEALTH CARE EDUCATION/TRAINING PROGRAM

## 2024-06-27 PROCEDURE — 99396 PREV VISIT EST AGE 40-64: CPT | Mod: S$GLB,,, | Performed by: STUDENT IN AN ORGANIZED HEALTH CARE EDUCATION/TRAINING PROGRAM

## 2024-06-27 RX ORDER — LOSARTAN POTASSIUM 100 MG/1
100 TABLET ORAL DAILY
Qty: 90 TABLET | Refills: 3 | Status: SHIPPED | OUTPATIENT
Start: 2024-06-27

## 2024-06-27 RX ORDER — HYDROCHLOROTHIAZIDE 12.5 MG/1
12.5 TABLET ORAL DAILY
Qty: 90 TABLET | Refills: 3 | Status: SHIPPED | OUTPATIENT
Start: 2024-06-27

## 2024-06-27 NOTE — PROGRESS NOTES
Subjective:       Patient ID: Coy Rosado Jr. is a 53 y.o. male.    Chief Complaint: Health maintenance examination [Z00.00]    Patient is established with me, here today for the following:    Health maintenance -   Colonoscopy performed about 4-5 years ago, with [facility]. Unsure screening interval.   Denies family history of colorectal cancer.  Denies significant family history of cardiac disease.  Denies family history of prostate cancer.  UTD on Tdap, COVID primary/booster, shingles (states 2nd dose MAR2023) vaccinations.  Due for COVID vaccinations.  Never smoker.  Denies current alcohol use.   Denies drug use.  Currently sexually active with wife.  Completed HIV and hepatitis C screening.  Endorses overall healthy diet.   Eating plenty of fresh vegetables, fruits.  Eats mostly at home.  Endorses exercising routinely.     HTN -   Currently prescribed losartan, HCTZ.  Has only been taking losartan  Denies side effects or concerns while taking medication.  Patient endorses home -140's and DBP 70-80's.  Due for micro albumin creatinine ratio.   Lab Results       Component                Value               Date                       MICALBCREAT              2.9                 04/17/2023            BP Readings from Last 5 Encounters:  11/02/23 : (!) 124/90  10/19/23 : (!) 142/82  04/18/23 : 129/85  04/17/23 : 130/82  12/29/22 : 125/62     Prediabetes -   Due for diabetes screening.  Lab Results       Component                Value               Date                       HGBA1C                   5.9 (H)             04/17/2023                 HGBA1C                   5.6                 11/08/2022              Elevated ASCVD risk score -   Lab Results       Component                Value               Date                       CHOL                     134                 04/17/2023            Lab Results       Component                Value               Date                       TRIG                     80                   04/17/2023            Lab Results       Component                Value               Date                       LDLCALC                  84.0                04/17/2023            Lab Results       Component                Value               Date                       HDL                      34 (L)              04/17/2023            The 10-year ASCVD risk score (Malissa BURCIAGA, et al., 2019) is: 10.1%    CHERRY -   Using CPAP nightly as directed  Last sleep study was NOV2022     Taking nexium for GERD with good effect      Review of Systems   Constitutional:  Negative for chills, fatigue, fever and unexpected weight change.   Respiratory:  Negative for cough and shortness of breath.    Cardiovascular:  Negative for chest pain, palpitations and leg swelling.   Neurological:  Negative for dizziness, syncope and headaches.         Current Outpatient Medications   Medication Instructions    celecoxib (CELEBREX) 100 mg, Oral, Daily, Additional refills should come from primary care doctor    hydroCHLOROthiazide (HYDRODIURIL) 12.5 mg, Oral, Daily    losartan (COZAAR) 100 mg, Oral, Daily    omega-3 fatty acids/fish oil (FISH OIL-OMEGA-3 FATTY ACIDS) 300-1,000 mg capsule Oral, Daily     Objective:      Vitals:    06/27/24 1405   BP: 132/86   Pulse: 71   SpO2: 98%   Weight: 121.9 kg (268 lb 11.9 oz)   PainSc: 0-No pain     Body mass index is 39.12 kg/m².    Physical Exam  Vitals reviewed.   Constitutional:       General: He is not in acute distress.     Appearance: Normal appearance. He is not ill-appearing or diaphoretic.   HENT:      Head: Normocephalic and atraumatic.      Right Ear: Tympanic membrane, ear canal and external ear normal. There is no impacted cerumen.      Left Ear: Tympanic membrane, ear canal and external ear normal. There is no impacted cerumen.      Nose: Nose normal. No rhinorrhea.      Mouth/Throat:      Mouth: Mucous membranes are moist.      Pharynx: Oropharynx is clear. No oropharyngeal  exudate or posterior oropharyngeal erythema.   Eyes:      General: No scleral icterus.        Right eye: No discharge.         Left eye: No discharge.      Conjunctiva/sclera: Conjunctivae normal.   Neck:      Thyroid: No thyromegaly or thyroid tenderness.      Trachea: Trachea normal.   Cardiovascular:      Rate and Rhythm: Normal rate and regular rhythm.      Heart sounds: Normal heart sounds. No murmur heard.     No friction rub. No gallop.   Pulmonary:      Effort: Pulmonary effort is normal. No respiratory distress.      Breath sounds: Normal breath sounds. No stridor. No wheezing, rhonchi or rales.   Abdominal:      General: Bowel sounds are normal. There is no distension.      Palpations: Abdomen is soft.      Tenderness: There is no abdominal tenderness. There is no guarding or rebound.   Musculoskeletal:         General: No swelling or deformity.      Cervical back: Neck supple.   Lymphadenopathy:      Head:      Right side of head: No submandibular or posterior auricular adenopathy.      Left side of head: No submandibular or posterior auricular adenopathy.      Cervical: No cervical adenopathy.      Right cervical: No superficial, deep or posterior cervical adenopathy.     Left cervical: No superficial, deep or posterior cervical adenopathy.      Upper Body:      Right upper body: No supraclavicular adenopathy.      Left upper body: No supraclavicular adenopathy.   Skin:     General: Skin is warm and dry.   Neurological:      General: No focal deficit present.      Mental Status: He is alert. Mental status is at baseline.      Gait: Gait normal.   Psychiatric:         Mood and Affect: Mood normal.         Behavior: Behavior normal.         Assessment:       1. Health maintenance examination    2. Hypertension, unspecified type    3. Prediabetes    4. [place] cardiac risk 10-20% in next 10 years    5. CHERRY (obstructive sleep apnea)    6. Vitamin B12 deficiency    7. Screening for colorectal cancer         Plan:       Hypertension, unspecified type  Continue losartan  Start HCTZ  RTC in 3 months for follow up.  -     hydroCHLOROthiazide (HYDRODIURIL) 12.5 MG Tab; Take 1 tablet (12.5 mg total) by mouth once daily.  -     losartan (COZAAR) 100 MG tablet; Take 1 tablet (100 mg total) by mouth once daily.  -     Comprehensive Metabolic Panel; Future  -     TSH; Future  -     CBC Auto Differential; Future  -     Microalbumin/Creatinine Ratio, Urine; Future    Prediabetes  Continue healthy diet and lifestyle modifications  RTC in 3 months for follow up.  -     Hemoglobin A1C; Future    [place] cardiac risk 10-20% in next 10 years  Continue healthy diet and lifestyle modifications  RTC in 3 months for follow up.  -     Lipid Panel; Future    CHERRY (obstructive sleep apnea)  Continue CPAP  RTC in 3 months for follow up.    Vitamin B12 deficiency  -     Vitamin B12; Future    Health maintenance examination  Reviewed and discussed age appropriate screenings and immunizations.  -     Comprehensive Metabolic Panel; Future  -     TSH; Future  -     Lipid Panel; Future  -     Hemoglobin A1C; Future  -     CBC Auto Differential; Future  -     Vitamin B12; Future  -     Microalbumin/Creatinine Ratio, Urine; Future    Screening for colorectal cancer  -     Ambulatory referral/consult to Endo Procedure ; Future      Jessica Coates MD  6/27/2024

## 2024-07-03 ENCOUNTER — LAB VISIT (OUTPATIENT)
Dept: LAB | Facility: OTHER | Age: 54
End: 2024-07-03
Attending: STUDENT IN AN ORGANIZED HEALTH CARE EDUCATION/TRAINING PROGRAM
Payer: COMMERCIAL

## 2024-07-03 DIAGNOSIS — Z91.89 FRAMINGHAM CARDIAC RISK 10-20% IN NEXT 10 YEARS: ICD-10-CM

## 2024-07-03 DIAGNOSIS — I10 HYPERTENSION, UNSPECIFIED TYPE: ICD-10-CM

## 2024-07-03 DIAGNOSIS — R73.03 PREDIABETES: ICD-10-CM

## 2024-07-03 DIAGNOSIS — Z00.00 HEALTH MAINTENANCE EXAMINATION: ICD-10-CM

## 2024-07-03 DIAGNOSIS — E53.8 VITAMIN B12 DEFICIENCY: ICD-10-CM

## 2024-07-03 LAB
ALBUMIN SERPL BCP-MCNC: 3.7 G/DL (ref 3.5–5.2)
ALP SERPL-CCNC: 98 U/L (ref 55–135)
ALT SERPL W/O P-5'-P-CCNC: 20 U/L (ref 10–44)
ANION GAP SERPL CALC-SCNC: 6 MMOL/L (ref 8–16)
AST SERPL-CCNC: 15 U/L (ref 10–40)
BASOPHILS # BLD AUTO: 0.03 K/UL (ref 0–0.2)
BASOPHILS NFR BLD: 0.5 % (ref 0–1.9)
BILIRUB SERPL-MCNC: 0.8 MG/DL (ref 0.1–1)
BUN SERPL-MCNC: 13 MG/DL (ref 6–20)
CALCIUM SERPL-MCNC: 9.5 MG/DL (ref 8.7–10.5)
CHLORIDE SERPL-SCNC: 103 MMOL/L (ref 95–110)
CHOLEST SERPL-MCNC: 220 MG/DL (ref 120–199)
CHOLEST/HDLC SERPL: 5.8 {RATIO} (ref 2–5)
CO2 SERPL-SCNC: 29 MMOL/L (ref 23–29)
CREAT SERPL-MCNC: 0.9 MG/DL (ref 0.5–1.4)
DIFFERENTIAL METHOD BLD: NORMAL
EOSINOPHIL # BLD AUTO: 0.1 K/UL (ref 0–0.5)
EOSINOPHIL NFR BLD: 1.5 % (ref 0–8)
ERYTHROCYTE [DISTWIDTH] IN BLOOD BY AUTOMATED COUNT: 12.4 % (ref 11.5–14.5)
EST. GFR  (NO RACE VARIABLE): >60 ML/MIN/1.73 M^2
ESTIMATED AVG GLUCOSE: 114 MG/DL (ref 68–131)
GLUCOSE SERPL-MCNC: 93 MG/DL (ref 70–110)
HBA1C MFR BLD: 5.6 % (ref 4–5.6)
HCT VFR BLD AUTO: 42.4 % (ref 40–54)
HDLC SERPL-MCNC: 38 MG/DL (ref 40–75)
HDLC SERPL: 17.3 % (ref 20–50)
HGB BLD-MCNC: 14.1 G/DL (ref 14–18)
IMM GRANULOCYTES # BLD AUTO: 0.03 K/UL (ref 0–0.04)
IMM GRANULOCYTES NFR BLD AUTO: 0.5 % (ref 0–0.5)
LDLC SERPL CALC-MCNC: 166.2 MG/DL (ref 63–159)
LYMPHOCYTES # BLD AUTO: 2.8 K/UL (ref 1–4.8)
LYMPHOCYTES NFR BLD: 45.5 % (ref 18–48)
MCH RBC QN AUTO: 30.6 PG (ref 27–31)
MCHC RBC AUTO-ENTMCNC: 33.3 G/DL (ref 32–36)
MCV RBC AUTO: 92 FL (ref 82–98)
MONOCYTES # BLD AUTO: 0.7 K/UL (ref 0.3–1)
MONOCYTES NFR BLD: 11.4 % (ref 4–15)
NEUTROPHILS # BLD AUTO: 2.5 K/UL (ref 1.8–7.7)
NEUTROPHILS NFR BLD: 40.6 % (ref 38–73)
NONHDLC SERPL-MCNC: 182 MG/DL
NRBC BLD-RTO: 0 /100 WBC
PLATELET # BLD AUTO: 272 K/UL (ref 150–450)
PMV BLD AUTO: 9.4 FL (ref 9.2–12.9)
POTASSIUM SERPL-SCNC: 4 MMOL/L (ref 3.5–5.1)
PROT SERPL-MCNC: 7.6 G/DL (ref 6–8.4)
RBC # BLD AUTO: 4.61 M/UL (ref 4.6–6.2)
SODIUM SERPL-SCNC: 138 MMOL/L (ref 136–145)
TRIGL SERPL-MCNC: 79 MG/DL (ref 30–150)
TSH SERPL DL<=0.005 MIU/L-ACNC: 0.87 UIU/ML (ref 0.4–4)
VIT B12 SERPL-MCNC: 345 PG/ML (ref 210–950)
WBC # BLD AUTO: 6.15 K/UL (ref 3.9–12.7)

## 2024-07-03 PROCEDURE — 80061 LIPID PANEL: CPT | Performed by: STUDENT IN AN ORGANIZED HEALTH CARE EDUCATION/TRAINING PROGRAM

## 2024-07-03 PROCEDURE — 80053 COMPREHEN METABOLIC PANEL: CPT | Performed by: STUDENT IN AN ORGANIZED HEALTH CARE EDUCATION/TRAINING PROGRAM

## 2024-07-03 PROCEDURE — 36415 COLL VENOUS BLD VENIPUNCTURE: CPT | Performed by: STUDENT IN AN ORGANIZED HEALTH CARE EDUCATION/TRAINING PROGRAM

## 2024-07-03 PROCEDURE — 84443 ASSAY THYROID STIM HORMONE: CPT | Performed by: STUDENT IN AN ORGANIZED HEALTH CARE EDUCATION/TRAINING PROGRAM

## 2024-07-03 PROCEDURE — 82607 VITAMIN B-12: CPT | Performed by: STUDENT IN AN ORGANIZED HEALTH CARE EDUCATION/TRAINING PROGRAM

## 2024-07-03 PROCEDURE — 85025 COMPLETE CBC W/AUTO DIFF WBC: CPT | Performed by: STUDENT IN AN ORGANIZED HEALTH CARE EDUCATION/TRAINING PROGRAM

## 2024-07-03 PROCEDURE — 83036 HEMOGLOBIN GLYCOSYLATED A1C: CPT | Performed by: STUDENT IN AN ORGANIZED HEALTH CARE EDUCATION/TRAINING PROGRAM

## 2024-09-11 NOTE — TELEPHONE ENCOUNTER
No new care gaps identified.  [facility] Embedded Care Gaps. Reference number: [id number]. 7/05/2022   12:21:24 PM CDT   Yes

## 2024-12-04 ENCOUNTER — OFFICE VISIT (OUTPATIENT)
Dept: INTERNAL MEDICINE | Facility: CLINIC | Age: 54
End: 2024-12-04
Payer: COMMERCIAL

## 2024-12-04 VITALS
HEART RATE: 68 BPM | DIASTOLIC BLOOD PRESSURE: 98 MMHG | OXYGEN SATURATION: 98 % | WEIGHT: 261 LBS | HEIGHT: 70 IN | SYSTOLIC BLOOD PRESSURE: 132 MMHG | BODY MASS INDEX: 37.37 KG/M2

## 2024-12-04 DIAGNOSIS — I10 HYPERTENSION, UNSPECIFIED TYPE: Primary | ICD-10-CM

## 2024-12-04 DIAGNOSIS — E53.8 VITAMIN B12 DEFICIENCY: ICD-10-CM

## 2024-12-04 DIAGNOSIS — G47.33 OSA (OBSTRUCTIVE SLEEP APNEA): ICD-10-CM

## 2024-12-04 DIAGNOSIS — R73.03 PREDIABETES: ICD-10-CM

## 2024-12-04 DIAGNOSIS — Z00.00 HEALTH MAINTENANCE EXAMINATION: ICD-10-CM

## 2024-12-04 DIAGNOSIS — J02.9 SORE THROAT: ICD-10-CM

## 2024-12-04 DIAGNOSIS — Z12.11 SCREENING FOR COLORECTAL CANCER: ICD-10-CM

## 2024-12-04 DIAGNOSIS — Z23 NEED FOR VACCINATION: ICD-10-CM

## 2024-12-04 DIAGNOSIS — Z12.12 SCREENING FOR COLORECTAL CANCER: ICD-10-CM

## 2024-12-04 DIAGNOSIS — Z91.89 FRAMINGHAM CARDIAC RISK 10-20% IN NEXT 10 YEARS: ICD-10-CM

## 2024-12-04 LAB
CTP QC/QA: YES
MOLECULAR STREP A: NEGATIVE

## 2024-12-04 PROCEDURE — G2211 COMPLEX E/M VISIT ADD ON: HCPCS | Mod: S$GLB,,, | Performed by: STUDENT IN AN ORGANIZED HEALTH CARE EDUCATION/TRAINING PROGRAM

## 2024-12-04 PROCEDURE — 99999 PR PBB SHADOW E&M-EST. PATIENT-LVL III: CPT | Mod: PBBFAC,,, | Performed by: STUDENT IN AN ORGANIZED HEALTH CARE EDUCATION/TRAINING PROGRAM

## 2024-12-04 PROCEDURE — 87651 STREP A DNA AMP PROBE: CPT | Mod: QW,S$GLB,, | Performed by: STUDENT IN AN ORGANIZED HEALTH CARE EDUCATION/TRAINING PROGRAM

## 2024-12-04 PROCEDURE — 99214 OFFICE O/P EST MOD 30 MIN: CPT | Mod: S$GLB,,, | Performed by: STUDENT IN AN ORGANIZED HEALTH CARE EDUCATION/TRAINING PROGRAM

## 2024-12-04 RX ORDER — HYDROCHLOROTHIAZIDE 25 MG/1
25 TABLET ORAL DAILY
Qty: 90 TABLET | Refills: 1 | Status: SHIPPED | OUTPATIENT
Start: 2024-12-04

## 2024-12-04 RX ORDER — AZELASTINE 1 MG/ML
2 SPRAY, METERED NASAL 2 TIMES DAILY
Qty: 30 ML | Refills: 2 | Status: SHIPPED | OUTPATIENT
Start: 2024-12-04 | End: 2025-12-04

## 2024-12-04 NOTE — PROGRESS NOTES
Subjective:       Patient ID: Coy Rosado Jr. is a 54 y.o. male.    Chief Complaint: Hypertension, unspecified type [I10]    Patient is established with me, here today for the following:    History of Present Illness      BLOOD PRESSURE MANAGEMENT:  He did not take his blood pressure medication this morning. He has not restarted Losartan and is currently taking Hydrochlorothiazide.    THROAT IRRITATION AND UPPER RESPIRATORY SYMPTOMS:  He reports experiencing throat irritation for approximately one week, describing a sore throat and the ability to taste acid in his throat. Symptoms began after returning from a trip to [place], accompanied by sneezing and a runny nose. To alleviate symptoms, he has been taking OTC Mucinex (daytime and nighttime formulations) and drinking tea with turmeric, which he reports has been somewhat helpful.    APPETITE AND TEMPERATURE REGULATION:  He reports loss of appetite following Thanksgiving, noting he only consumed a small amount of food during the holiday meal. He denies having an appetite for anything else since then. He also describes feeling cold and chilly, requiring him to wrap up frequently to stay warm.    MEDICATIONS:  He takes Nexium as needed.      ROS:  General: +loss of appetite  ENT: +sore throat, +runny nose        Health maintenance -   States colonoscopy performed about 4-5 years ago, with [facility]. Unsure screening interval.   Denies family history of colorectal cancer.  Denies significant family history of cardiac disease.  Denies family history of prostate cancer.  UTD on Tdap, COVID primary/booster, shingles (states 2nd dose MAR2023) vaccinations.  Due for COVID, influenza vaccinations.  Never smoker.  Denies current alcohol use.   Denies drug use.  Currently sexually active with wife.  Completed HIV and hepatitis C screening.     HTN -   Currently prescribed losartan, HCTZ.  Lab Results   Component Value Date    MICALBCREAT Unable to calculate 07/03/2024     BP  "Readings from Last 5 Encounters:   06/27/24 132/86   11/02/23 (!) 124/90   10/19/23 (!) 142/82   04/18/23 129/85   04/17/23 130/82      Prediabetes -   Lab Results   Component Value Date    HGBA1C 5.6 07/03/2024    HGBA1C 5.9 (H) 04/17/2023    HGBA1C 5.6 11/08/2022     Elevated ASCVD risk score -   Lab Results   Component Value Date    CHOL 220 (H) 07/03/2024     Lab Results   Component Value Date    TRIG 79 07/03/2024     Lab Results   Component Value Date    LDLCALC 166.2 (H) 07/03/2024     Lab Results   Component Value Date    HDL 38 (L) 07/03/2024   The 10-year ASCVD risk score (Malissa BURCIAGA, et al., 2019) is: 11.8%     CHERRY -   Using CPAP nightly as directed  Last sleep study was NOV2022     Taking nexium PRN for GERD        Current Outpatient Medications   Medication Instructions    celecoxib (CELEBREX) 100 mg, Oral, Daily, Additional refills should come from primary care doctor    hydroCHLOROthiazide (HYDRODIURIL) 12.5 mg, Oral, Daily    losartan (COZAAR) 100 mg, Oral, Daily    omega-3 fatty acids/fish oil (FISH OIL-OMEGA-3 FATTY ACIDS) 300-1,000 mg capsule Oral, Daily     Objective:      Vitals:    12/04/24 0947   BP: (!) 132/98   Pulse: 68   SpO2: 98%   Weight: 118.4 kg (261 lb 0.4 oz)   Height: 5' 9.5" (1.765 m)   PainSc: 0-No pain     Body mass index is 37.99 kg/m².    Physical Exam    Assessment:       1. Hypertension, unspecified type    2. Prediabetes    3. [place] cardiac risk 10-20% in next 10 years    4. CHERRY (obstructive sleep apnea)    5. Vitamin B12 deficiency    6. Health maintenance examination    7. Need for vaccination    8. Screening for colorectal cancer    9. Sore throat        Plan:   Hypertension, unspecified type  -     hydroCHLOROthiazide (HYDRODIURIL) 25 MG tablet; Take 1 tablet (25 mg total) by mouth once daily.  Dispense: 90 tablet; Refill: 1    Prediabetes    [place] cardiac risk 10-20% in next 10 years    CHERRY (obstructive sleep apnea)    Vitamin B12 deficiency    Health " maintenance examination    Need for vaccination    Screening for colorectal cancer  -     Ambulatory referral/consult to Endo Procedure ; Future; Expected date: 12/05/2024    Sore throat  -     azelastine (ASTELIN) 137 mcg (0.1 %) nasal spray; 2 sprays (274 mcg total) by Nasal route 2 (two) times daily.  Dispense: 30 mL; Refill: 2  -     POCT Strep A, Molecular      Assessment & Plan    IMPRESSION:  - Assessed throat irritation as likely due to postnasal drip from viral upper respiratory infection  - Evaluated enlarged lymph nodes and throat redness, ruled out strep throat pending test results  - Will increase hydrochlorothiazide dose for blood pressure management instead of re-adding Losartan  - Considered possible reflux contribution to throat symptoms    HYPERTENSION:  - Coy to monitor blood pressure at home more frequently.  - Increased hydrochlorothiazide from 12.5 mg to 25 mg daily for blood pressure management.  - Follow up via message to review blood pressure readings on new hydrochlorothiazide dose.    GASTROESOPHAGEAL REFLUX DISEASE:  - Discussed reflux as potential contributor to throat symptoms.  - Continued Nexium as needed, advised to take consistently for 1 week to manage reflux.    COMMON COLD / UPPER RESPIRATORY SYMPTOMS:  - Explained postnasal drip as cause of throat irritation.  - Recommend gargling with salt water (1/8 to 1/4 teaspoon in warm water) a few times daily for throat relief.  - Recommend drinking tea with honey to soothe throat.  - Started Azelastine nasal spray, 2 sprays in each nostril twice daily for 1 week.  - Strep throat test ordered.  - Contact the office if strep test is positive for antibiotic prescription.    GENERAL ADULT MEDICAL EXAMINATION:  - Referred to colonoscopy screening.           Jessica Coates MD  12/4/2024

## 2024-12-04 NOTE — PATIENT INSTRUCTIONS
For chest congestion try Mucinex 1,200 mg twice daily (must be well hydrated for the medication to work).   Sore throat can also be treated with warm tea with honey and salt water gargling (8 oz warm water with 1/4 tsp salt).   Try nasal saline rinses using only sterile or distilled water 1-2 times daily.   Sent azelastine for sinus congestion to pharmacy.

## 2024-12-10 ENCOUNTER — PATIENT MESSAGE (OUTPATIENT)
Dept: INTERNAL MEDICINE | Facility: CLINIC | Age: 54
End: 2024-12-10
Payer: COMMERCIAL

## 2024-12-14 ENCOUNTER — HOSPITAL ENCOUNTER (EMERGENCY)
Facility: HOSPITAL | Age: 54
Discharge: HOME | End: 2024-12-14
Attending: EMERGENCY MEDICINE
Payer: COMMERCIAL

## 2024-12-14 VITALS
HEART RATE: 93 BPM | BODY MASS INDEX: 36.83 KG/M2 | DIASTOLIC BLOOD PRESSURE: 85 MMHG | SYSTOLIC BLOOD PRESSURE: 139 MMHG | OXYGEN SATURATION: 99 % | WEIGHT: 253 LBS | TEMPERATURE: 98 F | RESPIRATION RATE: 20 BRPM

## 2024-12-14 DIAGNOSIS — M54.41 ACUTE RIGHT-SIDED LOW BACK PAIN WITH RIGHT-SIDED SCIATICA: Primary | ICD-10-CM

## 2024-12-14 DIAGNOSIS — M25.561 RIGHT KNEE PAIN: ICD-10-CM

## 2024-12-14 PROCEDURE — 99284 EMERGENCY DEPT VISIT MOD MDM: CPT

## 2024-12-14 PROCEDURE — 25000003 PHARM REV CODE 250: Performed by: EMERGENCY MEDICINE

## 2024-12-14 RX ORDER — LIDOCAINE 50 MG/G
1 PATCH TOPICAL
Status: DISCONTINUED | OUTPATIENT
Start: 2024-12-14 | End: 2024-12-14 | Stop reason: HOSPADM

## 2024-12-14 RX ORDER — LIDOCAINE 50 MG/G
1 PATCH TOPICAL DAILY
Qty: 15 PATCH | Refills: 0 | Status: SHIPPED | OUTPATIENT
Start: 2024-12-14

## 2024-12-14 RX ORDER — HYDROCODONE BITARTRATE AND ACETAMINOPHEN 5; 325 MG/1; MG/1
1 TABLET ORAL EVERY 6 HOURS PRN
Qty: 12 TABLET | Refills: 0 | Status: SHIPPED | OUTPATIENT
Start: 2024-12-14

## 2024-12-14 RX ORDER — NAPROXEN 500 MG/1
500 TABLET ORAL 2 TIMES DAILY WITH MEALS
Qty: 30 TABLET | Refills: 0 | Status: SHIPPED | OUTPATIENT
Start: 2024-12-14

## 2024-12-14 RX ADMIN — LIDOCAINE 1 PATCH: 50 PATCH CUTANEOUS at 06:12

## 2024-12-14 NOTE — ED NOTES
Patient identifiers for Coy Rosado Jr. 54 y.o. male checked and correct.  Chief Complaint   Patient presents with    right knee pain     Right knee pain, for the last day, pain with sitting down being uncomfortable, hx of knee replacement a year ago   Pt also endorses R side lower back pain w/ pain radiating down front and back of R leg. Taking celebrex and Tylenol arthritis w/ no relief. Denies recent falls/ trauma to site.   Past Medical History:   Diagnosis Date    Allergy     Diabetes mellitus, type 2     HTN (hypertension)     Low back pain      Allergies reported: Review of patient's allergies indicates:  No Known Allergies      HEENT: Denies vision changes. Denies ear drainage or hearing loss. No c/o nasal drainage. Denies dysphagia or voice changes.   Appearance: Pt awake, alert & oriented to person, place & time. Pt in no acute distress at present time. Pt is clean and well groomed with clothes appropriately fastened.   Skin: Skin warm, dry & intact. Color consistent with ethnicity. Mucous membranes moist. No breakdown or brusing noted.   Musculoskeletal: Patient moving all extremities well, no obvious swelling or deformities noted. +R lower back pain. +RLE pain  Respiratory: Respirations spontaneous, even, and non-labored. Visible chest rise noted. Airway is open and patent. No accessory muscle use noted.   Neurologic: Sensation is intact. Speech is clear and appropriate. Eyes open spontaneously, behavior appropriate to situation, follows commands, facial expression symmetrical, bilateral hand grasp equal and even, purposeful motor response noted.  Cardiac: All peripheral pulses present. No Bilateral lower extremity edema. Cap refill is <3 seconds.  Abdomen: Abdomen soft, non distended, non tender to palpation.   : Pt voids independently, denies dysuria, hematuria, frequency.

## 2024-12-14 NOTE — DISCHARGE INSTRUCTIONS
Hold celebrex while taking naproxen.    Do not drink or drive on this medication.  This medication puts you at risk for a fall.  Please use a cane or walker as needed.     Our goal in the emergency department is to always give you outstanding care and exceptional service. You may receive a survey by mail or e-mail in the next week regarding your experience in our ED. We would greatly appreciate your completing and returning the survey. Your feedback provides us with a way to recognize our staff who give very good care and it helps us learn how to improve when your experience was below our aspiration of excellence.

## 2024-12-14 NOTE — ED PROVIDER NOTES
Encounter Date: 12/14/2024       History     Chief Complaint   Patient presents with    right knee pain     Right knee pain, for the last day, pain with sitting down being uncomfortable, hx of knee replacement a year ago     54-year-old male with a history of right knee replacement and hypertension presents with right low back pain.  Started last night.  Radiates down his leg.  No loss of bowel or bladder.  No history of low back issues.  He missed work on Friday because of this pain.  Difficulty sleeping last night.  He denies trauma or acute inciting event.  He took Celebrex and Tylenol without resolution.  The patients available PMH, PSH, Social History, medications, allergies, and triage vital signs were reviewed just prior to their medical evaluation.         Review of patient's allergies indicates:  No Known Allergies  Past Medical History:   Diagnosis Date    Allergy     Diabetes mellitus, type 2     HTN (hypertension)     Low back pain      Past Surgical History:   Procedure Laterality Date    ARTHROPLASTY, KNEE, TOTAL, USING COMPUTER-ASSISTED NAVIGATION Right 12/5/2022    Procedure: ARTHROPLASTY, KNEE, TOTAL, USING COMPUTER-ASSISTED NAVIGATION: LURDES: RIGHT: MARTHA - NENA;  Surgeon: Shane Wills III, MD;  Location: [facility];  Service: Orthopedics;  Laterality: Right;    COLONOSCOPY      leg laceration  1984     Family History   Problem Relation Name Age of Onset    Bone cancer Mother      Stroke Father Parent     Diabetes type II Father Parent     Hearing loss Father Parent     No Known Problems Sister      No Known Problems Brother      No Known Problems Brother      No Known Problems Daughter       Social History     Tobacco Use    Smoking status: Never     Passive exposure: Never    Smokeless tobacco: Never   Substance Use Topics    Alcohol use: Never    Drug use: Never     Review of Systems   Constitutional:  Negative for fever.   Respiratory:  Negative for cough and shortness of breath.     Cardiovascular:  Negative for chest pain.   Gastrointestinal:  Negative for abdominal pain, diarrhea, nausea and vomiting.   Genitourinary:  Negative for dysuria.   Musculoskeletal:  Positive for back pain and myalgias.       Physical Exam     Initial Vitals [12/14/24 0557]   BP Pulse Resp Temp SpO2   139/85 93 20 97.9 °F (36.6 °C) 99 %      MAP       --         Physical Exam    Nursing note and vitals reviewed.  Constitutional: He appears well-developed and well-nourished. He is not diaphoretic. No distress.   HENT:   Head: Normocephalic and atraumatic.   Nose: Nose normal.   Eyes: Conjunctivae are normal. Right eye exhibits no discharge. Left eye exhibits no discharge.   Neck: Neck supple.   Normal range of motion.  Cardiovascular:  Normal rate, regular rhythm and normal heart sounds.     Exam reveals no gallop and no friction rub.       No murmur heard.  Pulmonary/Chest: Breath sounds normal. No respiratory distress. He has no wheezes. He has no rhonchi. He has no rales.   Abdominal: Abdomen is soft. He exhibits no distension. There is no abdominal tenderness. There is no rebound and no guarding.   Musculoskeletal:         General: Tenderness present. No edema. Normal range of motion.      Cervical back: Normal range of motion and neck supple.      Comments: Ttp in right perispinal muscle around L4-5 with palpable muscle knot which reproduces symptoms, negative straight leg on right, right knee s/p replacement, no effusion, normal right knee rom     Neurological: He is alert and oriented to person, place, and time. He has normal strength. GCS score is 15. GCS eye subscore is 4. GCS verbal subscore is 5. GCS motor subscore is 6.   Skin: Skin is warm and dry. No rash noted. No erythema.   Psychiatric: He has a normal mood and affect. His behavior is normal. Judgment and thought content normal.         ED Course   Procedures  Labs Reviewed   HEPATITIS C ANTIBODY   HIV 1 / 2 ANTIBODY          Imaging Results     None          Medications   LIDOcaine 5 % patch 1 patch (1 patch Transdermal Patch Applied 12/14/24 3614)     Medical Decision Making  54-year-old male presents with right low back strain.  Vitals normal.  Physical exam as above.  No indication for labs or imaging.  Doubt cauda equina, AAA, kidney stone, fracture, metastasis, diskitis, or any other acute life threat.  Will change celebrex to naproxen.  Rx vicodin and lidoderm.  Patient will call their primary physician tomorrow to schedule close follow-up. Patient will return to ED for worsening symptoms, inability to eat/drink, fever greater than 100.4, or any other concerns. Did bedside teaching with return precautions.  All questions answered.  The patient acknowledges understanding.  Gave written and verbal discharge instructions.     Risk  Prescription drug management.                                      Clinical Impression:  Final diagnoses:  [M25.561] Right knee pain  [M54.41] Acute right-sided low back pain with right-sided sciatica (Primary)          ED Disposition Condition    Discharge Stable          ED Prescriptions       Medication Sig Dispense Start Date End Date Auth. Provider    naproxen (NAPROSYN) 500 MG tablet Take 1 tablet (500 mg total) by mouth 2 (two) times daily with meals. 30 tablet 12/14/2024 -- Zander Alatorre MD    LIDOcaine (LIDODERM) 5 % Place 1 patch onto the skin once daily. 12 hours on followed by 12 hours off 15 patch 12/14/2024 -- Zander Alatorre MD    HYDROcodone-acetaminophen (NORCO) 5-325 mg per tablet Take 1 tablet by mouth every 6 (six) hours as needed for Pain. 12 tablet 12/14/2024 -- Zander Alatorre MD          Follow-up Information       Follow up With Specialties Details Why Contact Info    Follow up with primary physician as soon as possible.  Call tomorrow for an appointment.        [facility] - Emergency Dept Emergency Medicine  Return to ED for worsening symptoms, inability to eat/drink, fever greater  than 100.4, or any other concerns. [facility] [id number] [phone]             Zander Alatorre MD  12/14/24 0683

## 2025-03-13 ENCOUNTER — PATIENT MESSAGE (OUTPATIENT)
Dept: GASTROENTEROLOGY | Facility: CLINIC | Age: 55
End: 2025-03-13
Payer: COMMERCIAL

## 2025-03-13 ENCOUNTER — TELEPHONE (OUTPATIENT)
Dept: GASTROENTEROLOGY | Facility: CLINIC | Age: 55
End: 2025-03-13
Payer: COMMERCIAL

## 2025-03-13 DIAGNOSIS — Z12.11 COLON CANCER SCREENING: Primary | ICD-10-CM

## 2025-03-13 RX ORDER — CELECOXIB 50 MG/1
50 CAPSULE ORAL
COMMUNITY

## 2025-03-13 RX ORDER — LOSARTAN POTASSIUM 100 MG/1
100 TABLET ORAL DAILY
COMMUNITY

## 2025-03-13 NOTE — TELEPHONE ENCOUNTER
Spoke to patient to schedule colonoscopy       Physician to perform procedure(s): daria  Date of Procedure(s): 4/17/25  Location of Procedure(s): [facility]   Type of Rx Prep sent to patient: PEG  Instructions provided to patient via [employer] portal      Patient was informed on the following information and verbalized understanding:  If procedure requires anesthesia, a responsible adult needs to be present to accompany the patient home.   Discussed all instructions for the day before and the day of procedure.   Notified pt Ozempic needs to be held on 4/6/25 and to not restart medication until directed to do so by their MD   Patient will receive a prep-op call 7 days prior to appointment date to confirm medical/surgical history has not changed, verify ride status, and give patient any necessary reminders.   If applicable, the patient should contact their pharmacy to verify Rx for procedure prep is ready for pick-up. Patient was advised to call the scheduling department at [phone] if pharmacy states no Rx is available.   Patient was advised to call the endoscopy scheduling department if any questions or concerns arise.

## 2025-03-31 ENCOUNTER — PATIENT MESSAGE (OUTPATIENT)
Dept: ADMINISTRATIVE | Facility: HOSPITAL | Age: 55
End: 2025-03-31
Payer: COMMERCIAL

## 2025-05-01 ENCOUNTER — RESULTS FOLLOW-UP (OUTPATIENT)
Dept: GASTROENTEROLOGY | Facility: HOSPITAL | Age: 55
End: 2025-05-01

## 2025-05-21 ENCOUNTER — LAB VISIT (OUTPATIENT)
Dept: LAB | Facility: OTHER | Age: 55
End: 2025-05-21
Attending: COUNSELOR
Payer: COMMERCIAL

## 2025-05-21 ENCOUNTER — OFFICE VISIT (OUTPATIENT)
Dept: INTERNAL MEDICINE | Facility: CLINIC | Age: 55
End: 2025-05-21
Payer: COMMERCIAL

## 2025-05-21 ENCOUNTER — TELEPHONE (OUTPATIENT)
Dept: INTERNAL MEDICINE | Facility: CLINIC | Age: 55
End: 2025-05-21

## 2025-05-21 VITALS
BODY MASS INDEX: 41.63 KG/M2 | SYSTOLIC BLOOD PRESSURE: 128 MMHG | HEART RATE: 70 BPM | OXYGEN SATURATION: 95 % | HEIGHT: 66 IN | WEIGHT: 259.06 LBS | DIASTOLIC BLOOD PRESSURE: 76 MMHG

## 2025-05-21 DIAGNOSIS — R73.03 PREDIABETES: ICD-10-CM

## 2025-05-21 DIAGNOSIS — M79.671 RIGHT FOOT PAIN: Primary | ICD-10-CM

## 2025-05-21 DIAGNOSIS — B35.1 ONYCHOMYCOSIS: ICD-10-CM

## 2025-05-21 DIAGNOSIS — M79.671 RIGHT FOOT PAIN: ICD-10-CM

## 2025-05-21 LAB
ABSOLUTE EOSINOPHIL (OHS): 0.18 K/UL
ABSOLUTE MONOCYTE (OHS): 0.9 K/UL (ref 0.3–1)
ABSOLUTE NEUTROPHIL COUNT (OHS): 2.51 K/UL (ref 1.8–7.7)
ALBUMIN SERPL BCP-MCNC: 3.7 G/DL (ref 3.5–5.2)
ALP SERPL-CCNC: 84 UNIT/L (ref 40–150)
ALT SERPL W/O P-5'-P-CCNC: 19 UNIT/L (ref 10–44)
ANION GAP (OHS): 10 MMOL/L (ref 8–16)
AST SERPL-CCNC: 13 UNIT/L (ref 11–45)
BASOPHILS # BLD AUTO: 0.06 K/UL
BASOPHILS NFR BLD AUTO: 0.8 %
BILIRUB SERPL-MCNC: 0.4 MG/DL (ref 0.1–1)
BUN SERPL-MCNC: 19 MG/DL (ref 6–20)
CALCIUM SERPL-MCNC: 9.3 MG/DL (ref 8.7–10.5)
CHLORIDE SERPL-SCNC: 104 MMOL/L (ref 95–110)
CO2 SERPL-SCNC: 24 MMOL/L (ref 23–29)
CREAT SERPL-MCNC: 0.8 MG/DL (ref 0.5–1.4)
EAG (OHS): 120 MG/DL (ref 68–131)
ERYTHROCYTE [DISTWIDTH] IN BLOOD BY AUTOMATED COUNT: 12.2 % (ref 11.5–14.5)
GFR SERPLBLD CREATININE-BSD FMLA CKD-EPI: >60 ML/MIN/1.73/M2
GLUCOSE SERPL-MCNC: 93 MG/DL (ref 70–110)
HBA1C MFR BLD: 5.8 % (ref 4–5.6)
HCT VFR BLD AUTO: 40.6 % (ref 40–54)
HGB BLD-MCNC: 13.6 GM/DL (ref 14–18)
IMM GRANULOCYTES # BLD AUTO: 0.04 K/UL (ref 0–0.04)
IMM GRANULOCYTES NFR BLD AUTO: 0.6 % (ref 0–0.5)
LYMPHOCYTES # BLD AUTO: 3.4 K/UL (ref 1–4.8)
MCH RBC QN AUTO: 30.8 PG (ref 27–31)
MCHC RBC AUTO-ENTMCNC: 33.5 G/DL (ref 32–36)
MCV RBC AUTO: 92 FL (ref 82–98)
NUCLEATED RBC (/100WBC) (OHS): 0 /100 WBC
PLATELET # BLD AUTO: 257 K/UL (ref 150–450)
PMV BLD AUTO: 9.6 FL (ref 9.2–12.9)
POTASSIUM SERPL-SCNC: 4.2 MMOL/L (ref 3.5–5.1)
PROT SERPL-MCNC: 7.7 GM/DL (ref 6–8.4)
RBC # BLD AUTO: 4.42 M/UL (ref 4.6–6.2)
RELATIVE EOSINOPHIL (OHS): 2.5 %
RELATIVE LYMPHOCYTE (OHS): 48 % (ref 18–48)
RELATIVE MONOCYTE (OHS): 12.7 % (ref 4–15)
RELATIVE NEUTROPHIL (OHS): 35.4 % (ref 38–73)
SODIUM SERPL-SCNC: 138 MMOL/L (ref 136–145)
URATE SERPL-MCNC: 5.2 MG/DL (ref 3.4–7)
WBC # BLD AUTO: 7.09 K/UL (ref 3.9–12.7)

## 2025-05-21 PROCEDURE — 82040 ASSAY OF SERUM ALBUMIN: CPT

## 2025-05-21 PROCEDURE — 83036 HEMOGLOBIN GLYCOSYLATED A1C: CPT

## 2025-05-21 PROCEDURE — 85025 COMPLETE CBC W/AUTO DIFF WBC: CPT

## 2025-05-21 PROCEDURE — 99999 PR PBB SHADOW E&M-EST. PATIENT-LVL III: CPT | Mod: PBBFAC,,, | Performed by: COUNSELOR

## 2025-05-21 PROCEDURE — 36415 COLL VENOUS BLD VENIPUNCTURE: CPT

## 2025-05-21 PROCEDURE — 84550 ASSAY OF BLOOD/URIC ACID: CPT

## 2025-05-21 NOTE — TELEPHONE ENCOUNTER
----- Message from Ward Hylton PA-C sent at 5/21/2025  4:08 PM CDT -----  Please see if we can get records from [facility]. He saw Dr. Malloy towards the end of last week. Specifically we want any office notes, imaging results, lab results. Thank you!

## 2025-05-21 NOTE — Clinical Note
Please see if we can get records from [facility]. He saw Dr. Malloy towards the end of last week. Specifically we want any office notes, imaging results, lab results. Thank you!

## 2025-05-21 NOTE — PROGRESS NOTES
"  Subjective:       Patient ID: Coy Rosado Jr. is a 54 y.o. male with history of HTN, diabetes, low back pain, right knee OA, CHERRY.    Chief Complaint: Right foot pain [M79.671]    Patient is new to me, PCP is Dr. Jessica Coates. Here today for the following:    History of Present Illness    CHIEF COMPLAINT:  Patient presents today with foot pain.    HISTORY OF PRESENT ILLNESS:  He reports foot pain that began last Monday upon getting out of bed, describing a sensation of "bricks underneath the bottom part of the foot" when stepping on ceramic tile. The pain initially presented at the top of the foot with associated swelling and redness, but has since migrated primarily to the plantar surface. Pain intensified by Thursday. He denies any preceding injury, numbness, tingling, or burning sensations in the affected area. He was evaluated by Dr. Malloy at [facility] on Friday, where X-rays and ultrasound were performed. The podiatrist noted inflammation and swelling at the dorsal aspect of the foot and suggested possible gout. He was prescribed two medications to be taken two and 3 times daily respectively. He has implemented dietary modifications in response to the potential gout diagnosis but reports persistent plantar tenderness. The swelling, redness, and tenderness on dorsum of foot has improved considerably. He denies fevers, other joint pains, muscle weakness, or other loss of sensation.    MEDICAL HISTORY:  He has a history of knee replacement on the ipsilateral leg of the current foot pain. He has well-controlled diabetes and is taking Ozempic for weight loss.    DIET:  He denies alcohol consumption. His diet includes occasional red meat (hamburgers and steaks) and limited seafood intake (shrimp approximately every 3-4 weeks).      ROS:  General: -fever, -chills, -fatigue, -weight gain, -weight loss  Eyes: -vision changes, -redness, -discharge  ENT: -ear pain, -nasal congestion, -sore " "throat  Cardiovascular: -chest pain, -palpitations, +lower extremity edema  Respiratory: -cough, -shortness of breath  Gastrointestinal: -abdominal pain, -nausea, -vomiting, -diarrhea, -constipation, -blood in stool  Genitourinary: -dysuria, -hematuria, -frequency  Musculoskeletal: -joint pain, -muscle pain, +limb pain  Skin: -rash, -lesion  Neurological: -headache, -dizziness, -numbness, -tingling  Psychiatric: -anxiety, -depression, -sleep difficulty        Current Outpatient Medications   Medication Instructions    LIDOcaine (LIDODERM) 5 % 1 patch, Transdermal, Daily, 12 hours on followed by 12 hours off    loratadine-pseudoephedrine  mg (CLARITIN-D 24-HOUR)  mg per 24 hr tablet 1 tablet, Daily    losartan (COZAAR) 100 mg, Daily    semaglutide (OZEMPIC) 0.5 mg, Every 7 days     Objective:      Vitals:    05/21/25 1507   BP: 128/76   Pulse: 70   SpO2: 95%   Weight: 117.5 kg (259 lb 0.7 oz)   Height: 5' 6" (1.676 m)   PainSc:   2   PainLoc: Foot     Body mass index is 41.81 kg/m².    Physical Exam  Vitals reviewed.   Constitutional:       General: He is not in acute distress.     Appearance: Normal appearance. He is obese. He is not ill-appearing, toxic-appearing or diaphoretic.   HENT:      Head: Normocephalic and atraumatic.   Cardiovascular:      Rate and Rhythm: Normal rate and regular rhythm.      Pulses: Normal pulses.      Heart sounds: Normal heart sounds. No murmur heard.     No friction rub. No gallop.   Pulmonary:      Effort: Pulmonary effort is normal. No respiratory distress.      Breath sounds: Normal breath sounds. No stridor. No wheezing, rhonchi or rales.   Musculoskeletal:         General: Normal range of motion.      Right lower leg: No edema.      Left lower leg: No edema.      Comments: No swelling, erythema, warmth appreciated on dorsum of right foot.   Minimal tenderness of bottom of foot with no skin changes or ulcerations.  ROM intact and strength 5/5.   Patient had good " sensation to monofilament testing and <2 seconds capillary refill.  2+ DP and PT pulses bilaterally.    Skin:     General: Skin is warm and dry.      Capillary Refill: Capillary refill takes less than 2 seconds.      Findings: No bruising, erythema, lesion or rash.      Comments: Full nail onychomycosis noted in second digit of both feet.   Neurological:      General: No focal deficit present.      Mental Status: He is alert and oriented to person, place, and time. Mental status is at baseline.      Gait: Gait normal.   Psychiatric:         Mood and Affect: Mood normal.         Behavior: Behavior normal.         Thought Content: Thought content normal.         Judgment: Judgment normal.         Assessment:       1. Right foot pain    2. Prediabetes        IMPRESSION:  - Assessed foot pain, likely gout based on presentation of sudden onset, red, hot, swollen joint that improved with standard gout medications.  - Ruled out trauma or other joint involvement.  - Noted inability to definitively diagnose gout without joint aspiration and crystal analysis, but deemed unnecessary given clinical presentation and response to treatment.  - Evaluated for complications such as erosive changes or sensory deficits, finding none present.    Plan:     PLAN SUMMARY:  - Ordered uric acid level and HbA1c labs  - Continue current gout medications as prescribed by podiatrist  - Advised avoiding gout triggers (alcohol, red meat, certain seafoods)  - Continue Ozempic and weight loss efforts  - Referred to [facility] for follow-up care  - Follow-up in 1-2 weeks if foot pain persists  - Annual visit follow up in approximately 1 month    Right foot pain  - Patient's foot pain started last Monday with tenderness and swelling at the top of the foot, radiating throughout.  - Examination showed erythema, warmth, edema, and tenderness, though good range of motion, function, strength, sensation, and pulses were maintained.  - Pain and  swelling have improved with medication but tenderness persists underneath.  - Based on symptoms and medication response, gout is the likely diagnosis.  - Explained gout pathophysiology (uric acid crystal deposition in joints) and discussed common triggers   - Advised continuing to avoid known triggers, particularly alcohol, red meat, and certain seafoods.  - Patient to continue current gout medications as prescribed by podiatrist until pain completely resolves.  - Ordered labs including uric acid level for baseline measurement.  - Referred to [facility] for follow-up care.  - Recommend follow up in 1-2 weeks if foot pain persists.  -     Ambulatory referral/consult to Podiatry; Future; Expected date: 05/28/2025  -     URIC ACID; Future; Expected date: 05/21/2025  -     CBC Auto Differential; Future; Expected date: 05/21/2025  -     Comprehensive Metabolic Panel; Future; Expected date: 05/21/2025    Prediabetes  - Diabetes is very well controlled.  - Patient is taking Ozempic and working on weight loss.  - Ordered HbA1c labs as it has been a while since last drawn.  -     Hemoglobin A1C; Future; Expected date: 05/21/2025    Onychomycosis  - Identified fungal infection on the foot.  - Patient already prescribed medication by [facility] podiatrist.    ## PRESENCE OF RIGHT ARTIFICIAL KNEE JOINT:  - Documented the presence of a right knee replacement.    ## FOLLOW-UP:  - Follow up in approximately 1 month for annual visit.         33 minutes were spent in chart review, documentation and review of results, and evaluation, treatment, and counseling of patient on the same day of service. This note was generated with the assistance of ambient listening technology. Verbal consent was obtained by the patient and accompanying visitor(s) for the recording of patient appointment to facilitate this note. I attest to having reviewed and edited the generated note for accuracy, though some syntax or spelling errors may persist. Please  contact the author of this note for any clarification.    Nehemiah Hylton PA-C    5/21/2025

## 2025-05-21 NOTE — TELEPHONE ENCOUNTER
Spoke with Associate from  [facility] she stated the patient would need to sign record of release form.

## 2025-05-22 ENCOUNTER — RESULTS FOLLOW-UP (OUTPATIENT)
Dept: INTERNAL MEDICINE | Facility: CLINIC | Age: 55
End: 2025-05-22
Payer: COMMERCIAL

## 2025-05-22 NOTE — TELEPHONE ENCOUNTER
Left voice message for pt to give us a call back  to follow up on podiatry place pt has gone to.

## 2025-05-27 ENCOUNTER — TELEPHONE (OUTPATIENT)
Dept: INTERNAL MEDICINE | Facility: CLINIC | Age: 55
End: 2025-05-27
Payer: COMMERCIAL

## 2025-07-02 ENCOUNTER — TELEPHONE (OUTPATIENT)
Dept: PODIATRY | Facility: CLINIC | Age: 55
End: 2025-07-02
Payer: COMMERCIAL

## 2025-07-02 NOTE — TELEPHONE ENCOUNTER
Spoke with patient in regards to his missed appointment on 07/02/2025 w/Dr. العلي(Podiatry), patient has verbally confirmed new appt. Date and time

## 2025-07-09 ENCOUNTER — OFFICE VISIT (OUTPATIENT)
Dept: INTERNAL MEDICINE | Facility: CLINIC | Age: 55
End: 2025-07-09
Payer: COMMERCIAL

## 2025-07-09 VITALS
SYSTOLIC BLOOD PRESSURE: 130 MMHG | HEIGHT: 66 IN | BODY MASS INDEX: 42.45 KG/M2 | WEIGHT: 264.13 LBS | OXYGEN SATURATION: 97 % | DIASTOLIC BLOOD PRESSURE: 70 MMHG | HEART RATE: 72 BPM

## 2025-07-09 DIAGNOSIS — R73.03 PREDIABETES: ICD-10-CM

## 2025-07-09 DIAGNOSIS — Z23 NEED FOR VACCINATION: ICD-10-CM

## 2025-07-09 DIAGNOSIS — E66.813 CLASS 3 SEVERE OBESITY DUE TO EXCESS CALORIES WITH SERIOUS COMORBIDITY AND BODY MASS INDEX (BMI) OF 40.0 TO 44.9 IN ADULT: ICD-10-CM

## 2025-07-09 DIAGNOSIS — E66.01 CLASS 3 SEVERE OBESITY DUE TO EXCESS CALORIES WITH SERIOUS COMORBIDITY AND BODY MASS INDEX (BMI) OF 40.0 TO 44.9 IN ADULT: ICD-10-CM

## 2025-07-09 DIAGNOSIS — Z91.89 FRAMINGHAM CARDIAC RISK 10-20% IN NEXT 10 YEARS: ICD-10-CM

## 2025-07-09 DIAGNOSIS — I10 HYPERTENSION, UNSPECIFIED TYPE: ICD-10-CM

## 2025-07-09 DIAGNOSIS — Z00.00 HEALTH MAINTENANCE EXAMINATION: Primary | ICD-10-CM

## 2025-07-09 DIAGNOSIS — G47.33 OSA (OBSTRUCTIVE SLEEP APNEA): ICD-10-CM

## 2025-07-09 DIAGNOSIS — D64.9 ANEMIA, UNSPECIFIED TYPE: ICD-10-CM

## 2025-07-09 PROCEDURE — G2211 COMPLEX E/M VISIT ADD ON: HCPCS | Mod: S$GLB,,, | Performed by: STUDENT IN AN ORGANIZED HEALTH CARE EDUCATION/TRAINING PROGRAM

## 2025-07-09 PROCEDURE — 90677 PCV20 VACCINE IM: CPT | Mod: S$GLB,,, | Performed by: STUDENT IN AN ORGANIZED HEALTH CARE EDUCATION/TRAINING PROGRAM

## 2025-07-09 PROCEDURE — 90471 IMMUNIZATION ADMIN: CPT | Mod: S$GLB,,, | Performed by: STUDENT IN AN ORGANIZED HEALTH CARE EDUCATION/TRAINING PROGRAM

## 2025-07-09 PROCEDURE — 99999 PR PBB SHADOW E&M-EST. PATIENT-LVL III: CPT | Mod: PBBFAC,,, | Performed by: STUDENT IN AN ORGANIZED HEALTH CARE EDUCATION/TRAINING PROGRAM

## 2025-07-09 PROCEDURE — 99215 OFFICE O/P EST HI 40 MIN: CPT | Mod: 25,S$GLB,, | Performed by: STUDENT IN AN ORGANIZED HEALTH CARE EDUCATION/TRAINING PROGRAM

## 2025-07-09 RX ORDER — TIRZEPATIDE 2.5 MG/.5ML
2.5 INJECTION, SOLUTION SUBCUTANEOUS
Qty: 2 ML | Refills: 0 | Status: SHIPPED | OUTPATIENT
Start: 2025-07-09

## 2025-07-09 RX ORDER — TIRZEPATIDE 2.5 MG/.5ML
2.5 INJECTION, SOLUTION SUBCUTANEOUS
Qty: 2 ML | Refills: 0 | Status: SHIPPED | OUTPATIENT
Start: 2025-07-09 | End: 2025-07-09

## 2025-07-09 RX ORDER — LOSARTAN POTASSIUM 100 MG/1
100 TABLET ORAL DAILY
Qty: 90 TABLET | Refills: 1 | Status: CANCELLED | OUTPATIENT
Start: 2025-07-09

## 2025-07-09 RX ORDER — LOSARTAN POTASSIUM AND HYDROCHLOROTHIAZIDE 12.5; 1 MG/1; MG/1
1 TABLET ORAL DAILY
Qty: 90 TABLET | Refills: 3 | Status: SHIPPED | OUTPATIENT
Start: 2025-07-09 | End: 2026-07-09

## 2025-07-09 NOTE — PROGRESS NOTES
Subjective:       Patient ID: Coy Rosaod Jr. is a 54 y.o. male.    Chief Complaint: Health maintenance examination [Z00.00]    Patient is established with me, here today for the following:    History of Present Illness    BLOOD PRESSURE:  He reports home blood pressure readings have improved since starting Losartan 100mg, now fluctuating between 120s-140s systolic.    WEIGHT MANAGEMENT AND EXERCISE:  He has lost approximately 30 lbs through lifestyle modifications and remains motivated to continue non-surgical weight management strategies before considering bariatric surgery. His personal goal weight is 215 lbs. Exercise routine has been impacted by knee replacement, which limits treadmill use. He previously used elliptical machine regularly and is exploring alternative low-impact exercise options.    DIET:  He maintains a diet focused on lean proteins including chicken, fish, and seafood, limiting red meat to once weekly. He aims to increase vegetable intake and follows a 4176-9147 daily caloric goal with less than 50g carbohydrates per meal. He drinks one Body Armor Light (peach flavor) daily.      ROS:  Musculoskeletal: +joint pain, +pain with movement        Health maintenance -   Colonoscopy performed APR2025. Recommended repeat in 5-10 years.   Denies family history of colorectal cancer.  Denies significant family history of cardiac disease.  Denies family history of prostate cancer.  UTD on Tdap, COVID primary/booster, shingles (states 2nd dose MAR2023) vaccinations.  Due for COVID, PCV20 vaccinations.  Never smoker.  Denies current alcohol use.   Denies drug use.  Currently sexually active with wife.  Completed HIV and hepatitis C screening.     HTN -   Currently prescribed losartan.  Lab Results   Component Value Date    MICALBCREAT Unable to calculate 07/03/2024     BP Readings from Last 5 Encounters:   05/21/25 128/76   04/17/25 119/74   12/14/24 139/85   12/04/24 (!) 132/98   06/27/24 132/86  "    Prediabetes -  Lab Results   Component Value Date    HGBA1C 5.8 (H) 05/21/2025    HGBA1C 5.6 07/03/2024    HGBA1C 5.9 (H) 04/17/2023     Wt Readings from Last 10 Encounters:   07/09/25 119.8 kg (264 lb 1.8 oz)   05/21/25 117.5 kg (259 lb 0.7 oz)   04/17/25 114.3 kg (251 lb 14 oz)   12/14/24 114.8 kg (253 lb)   12/04/24 118.4 kg (261 lb 0.4 oz)   06/27/24 121.9 kg (268 lb 11.9 oz)   04/23/24 117.9 kg (260 lb)   04/01/24 119.6 kg (263 lb 9 oz)   11/02/23 117.9 kg (259 lb 14.8 oz)   10/19/23 117.8 kg (259 lb 11.2 oz)     Elevated ASCVD risk score -   Lab Results   Component Value Date    CHOL 220 (H) 07/03/2024     Lab Results   Component Value Date    TRIG 79 07/03/2024     Lab Results   Component Value Date    LDLCALC 166.2 (H) 07/03/2024     Lab Results   Component Value Date    HDL 38 (L) 07/03/2024     CHERRY -   Using CPAP nightly as directed  Last sleep study was NOV2022     Taking nexium PRN for GERD    Anemia -   Lab Results   Component Value Date    HGB 13.6 (L) 05/21/2025    HCT 40.6 05/21/2025    MCV 92 05/21/2025    RDW 12.2 05/21/2025     Lab Results   Component Value Date    IRON 94 10/19/2023    TRANSFERRIN 221 10/19/2023    TIBC 327 10/19/2023    FESATURATED 29 10/19/2023      Lab Results   Component Value Date    FERRITIN 193 10/19/2023     Lab Results   Component Value Date    CGKVJLXI02 345 07/03/2024     Lab Results   Component Value Date    FOLATE 10.9 10/19/2023          Current Outpatient Medications   Medication Instructions    loratadine-pseudoephedrine  mg (CLARITIN-D 24-HOUR)  mg per 24 hr tablet 1 tablet, Daily    losartan-hydrochlorothiazide 100-12.5 mg (HYZAAR) 100-12.5 mg Tab 1 tablet, Oral, Daily    ZEPBOUND 2.5 mg, Subcutaneous, Every 7 days     Objective:      Vitals:    07/09/25 1509   BP: 130/70   Pulse: 72   SpO2: 97%   Weight: 119.8 kg (264 lb 1.8 oz)   Height: 5' 6" (1.676 m)   PainSc: 0-No pain     Body mass index is 42.63 kg/m².    Physical Exam  Vitals reviewed. "   Constitutional:       General: He is not in acute distress.     Appearance: He is not ill-appearing or diaphoretic.   Cardiovascular:      Rate and Rhythm: Normal rate and regular rhythm.      Heart sounds: Normal heart sounds. No murmur heard.     No friction rub. No gallop.   Pulmonary:      Effort: Pulmonary effort is normal. No respiratory distress.      Breath sounds: Normal breath sounds. No stridor. No wheezing, rhonchi or rales.   Skin:     General: Skin is warm and dry.      Comments: Color WNL   Neurological:      Mental Status: He is alert. Mental status is at baseline.   Psychiatric:         Mood and Affect: Mood normal.         Behavior: Behavior normal.         Assessment:       1. Health maintenance examination    2. Hypertension, unspecified type    3. CHERRY (obstructive sleep apnea)    4. Prediabetes    5. Class 3 severe obesity due to excess calories with serious comorbidity and body mass index (BMI) of 40.0 to 44.9 in adult    6. Anemia, unspecified type    7. [place] cardiac risk 10-20% in next 10 years    8. Need for vaccination        Plan:   Health maintenance examination  -     CBC Auto Differential; Future; Expected date: 09/09/2025  -     Ferritin; Future; Expected date: 09/09/2025  -     Iron and TIBC; Future; Expected date: 09/09/2025  -     Folate; Future; Expected date: 09/09/2025  -     Vitamin B12; Future; Expected date: 09/09/2025  -     TSH; Future; Expected date: 09/09/2025  -     Lipid Panel; Future; Expected date: 09/09/2025  -     Hemoglobin A1C; Future; Expected date: 09/09/2025  -     Comprehensive Metabolic Panel; Future; Expected date: 09/09/2025  -     Microalbumin/Creatinine Ratio, Urine; Future; Expected date: 09/09/2025    Hypertension, unspecified type  -     losartan-hydrochlorothiazide 100-12.5 mg (HYZAAR) 100-12.5 mg Tab; Take 1 tablet by mouth once daily.  Dispense: 90 tablet; Refill: 3  -     TSH; Future; Expected date: 09/09/2025  -     Comprehensive Metabolic  Panel; Future; Expected date: 09/09/2025  -     Microalbumin/Creatinine Ratio, Urine; Future; Expected date: 09/09/2025    CHERRY (obstructive sleep apnea)  -     Discontinue: tirzepatide, weight loss, (ZEPBOUND) 2.5 mg/0.5 mL PnIj; Inject 2.5 mg into the skin every 7 days.  Dispense: 2 mL; Refill: 0  -     tirzepatide, weight loss, (ZEPBOUND) 2.5 mg/0.5 mL PnIj; Inject 2.5 mg into the skin every 7 days.  Dispense: 2 mL; Refill: 0    Prediabetes  -     Hemoglobin A1C; Future; Expected date: 09/09/2025    Class 3 severe obesity due to excess calories with serious comorbidity and body mass index (BMI) of 40.0 to 44.9 in adult  -     Discontinue: tirzepatide, weight loss, (ZEPBOUND) 2.5 mg/0.5 mL PnIj; Inject 2.5 mg into the skin every 7 days.  Dispense: 2 mL; Refill: 0  -     tirzepatide, weight loss, (ZEPBOUND) 2.5 mg/0.5 mL PnIj; Inject 2.5 mg into the skin every 7 days.  Dispense: 2 mL; Refill: 0    Anemia, unspecified type  -     CBC Auto Differential; Future; Expected date: 09/09/2025  -     Ferritin; Future; Expected date: 09/09/2025  -     Iron and TIBC; Future; Expected date: 09/09/2025  -     Folate; Future; Expected date: 09/09/2025  -     Vitamin B12; Future; Expected date: 09/09/2025    [place] cardiac risk 10-20% in next 10 years  -     Lipid Panel; Future; Expected date: 09/09/2025    Need for vaccination  -     pneumoc 20-adrián conj-dip cr(PF) (PREVNAR-20 (PF)) injection Syrg 0.5 mL        Assessment & Plan      PLAN SUMMARY:  - Initiated Zepbound (tirzepatide) 2.5 mg weekly injection for weight loss, prediabetes management, and potential cholesterol improvements  - Ordered Comprehensive Metabolic Panel, A1C, Complete Blood Count, and lipid panel  - Administered pneumonia vaccine in office  - Plan to try combination tablet of losartan and hydrochlorothiazide for better BP control  - Recommend diet modifications: focus on lean proteins, vegetables, limit carbohydrates and red meat  - Advised using  [employer] logan to track food intake 1-2 days per week  - Recommend 150 minutes of exercise per week at target heart rate of 142 bpm  - Coy to message after third dose of Zepbound to discuss dose adjustment  - Return in September or October after completing all ordered lab work  - Contact office if experiencing significant side effects    ## HYPERTENSION:  - Current BP is 130/70, slightly higher than desired.  - Home readings occasionally reach 140s-150s but are sometimes below 130.  - Coy is currently on Losartan 100mg.  - Plan to try a combination tablet of losartan and hydrochlorothiazide for better BP control.  - Initiated Zepbound (tirzepatide) 2.5 mg weekly injection which may also help improve BP.    ## TYPE 2 DIABETES (PREDIABETES):  - Evaluated recent A1C elevation.  - Initiated Zepbound (tirzepatide) 2.5 mg weekly injection for potential improvements in prediabetes and weight loss.  - Discussed GLP-1 medications like Ozempic and Mounjaro as options for weight loss and prediabetes management.  - Coy to message after the third dose of Zepbound to discuss dose adjustment and contact office if not experiencing significant side effects.  - Explained potential side effects including nausea, constipation/diarrhea, and theoretical risk of medullary thyroid carcinoma.  - Advised on minimizing GI side effects by eating smaller portions slowly and staying hydrated.  - Ordered Comprehensive Metabolic Panel and A1C before next appointment.    ## ANEMIA:  - Noted mild anemia in recent labs.  - Ordered Complete Blood Count and other labs to be completed before next appointment to check for specific cause.    ## WEIGHT MANAGEMENT AND NUTRITION:  - Started Zepbound (tirzepatide) 2.5 mg weekly for weight loss and potential cholesterol improvements.  - Coy to aim for 4002-6498 calories per day with carbohydrate intake limited to less than 20g per snack and under 50g per meal.  - Focus on lean proteins (chicken,  "fish, turkey) and make vegetables half of each meal, avoiding starchy vegetables (potatoes, peas, corn).  - Limit red meat to no more than once weekly.  - Use [employer] logan to track food intake 1-2 days per week.  - Discussed importance of whole foods, fresh produce, and unprocessed meats in a healthy diet.  - Explained benefits of whole grains for digestive health and sustained energy.  - Clarified misconceptions about "superfoods" and supplements, emphasizing balanced nutrition.    ## EXERCISE PLAN:  - Calculated target heart rate for exercise (142 bpm).  - Recommend exercising to reach this target heart rate for 150 minutes per week.    ## PREVENTIVE CARE:  - Administered pneumonia vaccine in office.  - Ordered lipid panel to be completed before next appointment.    ## FOLLOW-UP:  - Return in September or October after completing all ordered lab work (CBC, CMP, A1C, lipid panel).         43 minutes were spent in chart review, documentation and review of results, and evaluation, treatment, and counseling of patient on the same day of service.    Jessica Coates MD      7/9/2025      "

## 2025-07-09 NOTE — PROGRESS NOTES
After obtaining consent, and per orders of Dr. Coates, injection of PCV20 Lot [id number] Exp 08/2026 given in the ld by Clarence. Patient tolerated well and band aid applied. Patient instructed to remain in clinic for 15 minutes afterwards, and to report any adverse reaction to me immediately.

## 2025-07-09 NOTE — PATIENT INSTRUCTIONS
Start Zepbound 2.5 mg weekly.  If tolerating medication without side effects, contact office after 3rd injection and can increase to Zepbound 5 mg weekly.   Recommend the following:      Goal of at least 150 minutes aerobic exercise weekly. Recommend 30 mins, 5 days weekly.      Target heart rate while performing aerobic activity 142 bpm.       Monitoring caloric intake for 1-2 days weekly, with a goal of 1600 - 1800 kcal/day.       Goal carbohydrate intake <20 grams/snack and for meals <50 grams/meal, total carbohydrates <200 grams/snack.      Try using an logan like [employer]itness Pal to track food intake and calories.       Organizing plate with half vegetables, quarter whole grain starches or starchy vegetables, and quarter lean protein.       Goal of at least 1 vegetarian meal weekly and emphasizing fresh vegetables and fruits in diet.       Weight loss goal of 1-2 lbs per week.       Increasing healthy based fats such as avocados, olive oil, nuts, and freshwater, cold-water fish.      Decreasing red meat to no more than once weekly.

## 2025-07-11 ENCOUNTER — TELEPHONE (OUTPATIENT)
Dept: INTERNAL MEDICINE | Facility: CLINIC | Age: 55
End: 2025-07-11
Payer: COMMERCIAL

## 2025-08-14 ENCOUNTER — PATIENT MESSAGE (OUTPATIENT)
Dept: INTERNAL MEDICINE | Facility: CLINIC | Age: 55
End: 2025-08-14
Payer: COMMERCIAL

## (undated) DEVICE — KIT VIZADISC KNEE TRACKING

## (undated) DEVICE — GOWN ECLIPSE REINF LV4 XLNG XL

## (undated) DEVICE — DRAPE SURG W/TWL 17 5/8X23

## (undated) DEVICE — ADHESIVE DERMABOND ADVANCED

## (undated) DEVICE — SUT 1 36IN COATED VICRYL UN

## (undated) DEVICE — DRAPE THREE-QTR REINF 53X77IN

## (undated) DEVICE — GAUZE SPONGE 4X4 12PLY

## (undated) DEVICE — TOWEL OR XRAY WHITE 17X26IN

## (undated) DEVICE — UNDERGLOVES BIOGEL PI SIZE 8.5

## (undated) DEVICE — SPONGE GAUZE 16PLY 4X4

## (undated) DEVICE — MASK FLYTE HOOD PEEL AWAY

## (undated) DEVICE — BIT DRILL 2.7MM STRAIGHT

## (undated) DEVICE — ELECTRODE REM PLYHSV RETURN 9

## (undated) DEVICE — BANDAGE MATRIX HK LOOP 6IN 5YD

## (undated) DEVICE — PUMP COLD THERAPY

## (undated) DEVICE — KIT TOTAL KNEE TKOFG OMC

## (undated) DEVICE — IMPLANTABLE DEVICE
Type: IMPLANTABLE DEVICE | Site: KNEE | Status: NON-FUNCTIONAL
Removed: 2022-12-05

## (undated) DEVICE — KIT CHECKPOINT MAKO

## (undated) DEVICE — BLADE SURG #15 CARBON STEEL

## (undated) DEVICE — GLOVE BIOGEL SKINSENSE PI 8.0

## (undated) DEVICE — SUT QUILL PDO VIOL CP 45CM 2

## (undated) DEVICE — WRAP KNEE ACCU THERM GEL PACK

## (undated) DEVICE — SYR 50CC LL

## (undated) DEVICE — PIN BONE 3.2X110MM
Type: IMPLANTABLE DEVICE | Site: KNEE | Status: NON-FUNCTIONAL
Removed: 2022-12-05

## (undated) DEVICE — GLOVE BIOGEL PI MICRO SZ 7

## (undated) DEVICE — DRAPE T EXTRM SURG 121X128X90

## (undated) DEVICE — KIT IRR SUCTION HND PIECE

## (undated) DEVICE — BLADE MAKO STANDARD

## (undated) DEVICE — DRAPE INCISE IOBAN 2 23X33IN

## (undated) DEVICE — BRUSH SCRUB HIBICLENS 4%

## (undated) DEVICE — TOWER MIX CEMENT BONE SMARTMIX

## (undated) DEVICE — NDL 18GA X1 1/2 REG BEVEL

## (undated) DEVICE — KIT DRAPE RIO ONE PIECE W/POCK

## (undated) DEVICE — GOWN SMARTGOWN 3XL XLONG

## (undated) DEVICE — SOL IRR NACL .9% 3000ML

## (undated) DEVICE — SOL BETADINE 5%

## (undated) DEVICE — CATH SUCTION 10FR

## (undated) DEVICE — SYS REVOLUTION CEMENT MIXING

## (undated) DEVICE — DRESSING TELFA N ADH 3X8

## (undated) DEVICE — SUT MCRYL PLUS 4-0 PS2 27IN

## (undated) DEVICE — DRESSING TRANS 4X4 TEGADERM

## (undated) DEVICE — BLADE DUAL CUT SAG 35X64X.89MM

## (undated) DEVICE — TAPE SILK 3IN

## (undated) DEVICE — UNDERGLOVES BIOGEL PI SIZE 7.5

## (undated) DEVICE — SUT 2/0 36IN COATED VICRYL

## (undated) DEVICE — ALCOHOL 70% ISOP RUBBING 4OZ

## (undated) DEVICE — MARKER SKIN STND TIP BLUE BARR

## (undated) DEVICE — DRESSING AQUACEL AG RBBN 2X45

## (undated) DEVICE — PAD KNEE POLAR XL

## (undated) DEVICE — BLANKET HYPOTHERMIA 25X64IN

## (undated) DEVICE — DRAPE TOP 53X102IN